# Patient Record
Sex: FEMALE | Race: WHITE | Employment: OTHER | URBAN - METROPOLITAN AREA
[De-identification: names, ages, dates, MRNs, and addresses within clinical notes are randomized per-mention and may not be internally consistent; named-entity substitution may affect disease eponyms.]

---

## 2017-01-25 ENCOUNTER — APPOINTMENT (OUTPATIENT)
Dept: LAB | Facility: MEDICAL CENTER | Age: 77
End: 2017-01-25
Payer: COMMERCIAL

## 2017-01-25 ENCOUNTER — GENERIC CONVERSION - ENCOUNTER (OUTPATIENT)
Dept: OTHER | Facility: OTHER | Age: 77
End: 2017-01-25

## 2017-01-25 DIAGNOSIS — J30.9 ALLERGIC RHINITIS: ICD-10-CM

## 2017-01-25 DIAGNOSIS — Z00.00 ENCOUNTER FOR GENERAL ADULT MEDICAL EXAMINATION WITHOUT ABNORMAL FINDINGS: ICD-10-CM

## 2017-01-25 DIAGNOSIS — E03.9 HYPOTHYROIDISM: ICD-10-CM

## 2017-01-25 LAB
HEMOCCULT STL QL IA: POSITIVE
TSH SERPL DL<=0.05 MIU/L-ACNC: 7.61 UIU/ML (ref 0.36–3.74)

## 2017-01-25 PROCEDURE — G0328 FECAL BLOOD SCRN IMMUNOASSAY: HCPCS

## 2017-01-25 PROCEDURE — 84443 ASSAY THYROID STIM HORMONE: CPT

## 2017-01-25 PROCEDURE — 36415 COLL VENOUS BLD VENIPUNCTURE: CPT

## 2017-02-20 ENCOUNTER — HOSPITAL ENCOUNTER (OUTPATIENT)
Dept: RADIOLOGY | Age: 77
Discharge: HOME | End: 2017-02-20
Payer: COMMERCIAL

## 2017-02-20 DIAGNOSIS — R29.890 LOSS OF HEIGHT: ICD-10-CM

## 2017-02-20 PROCEDURE — 77080 DXA BONE DENSITY AXIAL: CPT

## 2017-02-21 ENCOUNTER — GENERIC CONVERSION - ENCOUNTER (OUTPATIENT)
Dept: OTHER | Facility: OTHER | Age: 77
End: 2017-02-21

## 2017-03-08 DIAGNOSIS — E78.00 PURE HYPERCHOLESTEROLEMIA: ICD-10-CM

## 2017-03-08 DIAGNOSIS — I10 ESSENTIAL (PRIMARY) HYPERTENSION: ICD-10-CM

## 2017-03-08 DIAGNOSIS — E03.9 HYPOTHYROIDISM: ICD-10-CM

## 2017-03-23 ENCOUNTER — ALLSCRIPTS OFFICE VISIT (OUTPATIENT)
Dept: OTHER | Facility: OTHER | Age: 77
End: 2017-03-23

## 2017-04-12 ENCOUNTER — APPOINTMENT (OUTPATIENT)
Dept: LAB | Facility: CLINIC | Age: 77
End: 2017-04-12
Payer: COMMERCIAL

## 2017-04-12 DIAGNOSIS — E03.9 HYPOTHYROIDISM: ICD-10-CM

## 2017-04-12 DIAGNOSIS — I10 ESSENTIAL (PRIMARY) HYPERTENSION: ICD-10-CM

## 2017-04-12 DIAGNOSIS — E78.00 PURE HYPERCHOLESTEROLEMIA: ICD-10-CM

## 2017-04-12 LAB
ALBUMIN SERPL BCP-MCNC: 3.6 G/DL (ref 3.5–5)
ALP SERPL-CCNC: 83 U/L (ref 46–116)
ALT SERPL W P-5'-P-CCNC: 23 U/L (ref 12–78)
ANION GAP SERPL CALCULATED.3IONS-SCNC: 10 MMOL/L (ref 4–13)
AST SERPL W P-5'-P-CCNC: 16 U/L (ref 5–45)
BILIRUB SERPL-MCNC: 0.27 MG/DL (ref 0.2–1)
BUN SERPL-MCNC: 42 MG/DL (ref 5–25)
CALCIUM SERPL-MCNC: 9.5 MG/DL (ref 8.3–10.1)
CHLORIDE SERPL-SCNC: 99 MMOL/L (ref 100–108)
CHOLEST SERPL-MCNC: 223 MG/DL (ref 50–200)
CO2 SERPL-SCNC: 27 MMOL/L (ref 21–32)
CREAT SERPL-MCNC: 1.5 MG/DL (ref 0.6–1.3)
GFR SERPL CREATININE-BSD FRML MDRD: 33.8 ML/MIN/1.73SQ M
GLUCOSE P FAST SERPL-MCNC: 99 MG/DL (ref 65–99)
HDLC SERPL-MCNC: 48 MG/DL (ref 40–60)
LDLC SERPL CALC-MCNC: 112 MG/DL (ref 0–100)
POTASSIUM SERPL-SCNC: 3.5 MMOL/L (ref 3.5–5.3)
PROT SERPL-MCNC: 7.9 G/DL (ref 6.4–8.2)
SODIUM SERPL-SCNC: 136 MMOL/L (ref 136–145)
TRIGL SERPL-MCNC: 317 MG/DL
TSH SERPL DL<=0.05 MIU/L-ACNC: 2.56 UIU/ML (ref 0.36–3.74)

## 2017-04-12 PROCEDURE — 80061 LIPID PANEL: CPT

## 2017-04-12 PROCEDURE — 80053 COMPREHEN METABOLIC PANEL: CPT

## 2017-04-12 PROCEDURE — 36415 COLL VENOUS BLD VENIPUNCTURE: CPT

## 2017-04-12 PROCEDURE — 84443 ASSAY THYROID STIM HORMONE: CPT

## 2017-06-09 ENCOUNTER — GENERIC CONVERSION - ENCOUNTER (OUTPATIENT)
Dept: OTHER | Facility: OTHER | Age: 77
End: 2017-06-09

## 2017-06-16 ENCOUNTER — TRANSCRIBE ORDERS (OUTPATIENT)
Dept: LAB | Facility: CLINIC | Age: 77
End: 2017-06-16

## 2017-06-16 ENCOUNTER — APPOINTMENT (OUTPATIENT)
Dept: LAB | Facility: CLINIC | Age: 77
End: 2017-06-16
Payer: COMMERCIAL

## 2017-06-16 DIAGNOSIS — Z00.00 ROUTINE GENERAL MEDICAL EXAMINATION AT A HEALTH CARE FACILITY: ICD-10-CM

## 2017-06-16 DIAGNOSIS — I10 ESSENTIAL HYPERTENSION, MALIGNANT: Primary | ICD-10-CM

## 2017-06-16 LAB
ANION GAP SERPL CALCULATED.3IONS-SCNC: 5 MMOL/L (ref 4–13)
BUN SERPL-MCNC: 19 MG/DL (ref 5–25)
CALCIUM SERPL-MCNC: 9.4 MG/DL (ref 8.3–10.1)
CHLORIDE SERPL-SCNC: 105 MMOL/L (ref 100–108)
CO2 SERPL-SCNC: 30 MMOL/L (ref 21–32)
CREAT SERPL-MCNC: 0.83 MG/DL (ref 0.6–1.3)
ERYTHROCYTE [DISTWIDTH] IN BLOOD BY AUTOMATED COUNT: 15 % (ref 11.6–15.1)
GFR SERPL CREATININE-BSD FRML MDRD: >60 ML/MIN/1.73SQ M
GLUCOSE P FAST SERPL-MCNC: 92 MG/DL (ref 65–99)
HCT VFR BLD AUTO: 42.2 % (ref 34.8–46.1)
HGB BLD-MCNC: 13.5 G/DL (ref 11.5–15.4)
MCH RBC QN AUTO: 29.3 PG (ref 26.8–34.3)
MCHC RBC AUTO-ENTMCNC: 32 G/DL (ref 31.4–37.4)
MCV RBC AUTO: 92 FL (ref 82–98)
PLATELET # BLD AUTO: 319 THOUSANDS/UL (ref 149–390)
PMV BLD AUTO: 11.1 FL (ref 8.9–12.7)
POTASSIUM SERPL-SCNC: 4.2 MMOL/L (ref 3.5–5.3)
RBC # BLD AUTO: 4.61 MILLION/UL (ref 3.81–5.12)
SODIUM SERPL-SCNC: 140 MMOL/L (ref 136–145)
WBC # BLD AUTO: 8 THOUSAND/UL (ref 4.31–10.16)

## 2017-06-16 PROCEDURE — 85027 COMPLETE CBC AUTOMATED: CPT

## 2017-06-16 PROCEDURE — 80048 BASIC METABOLIC PNL TOTAL CA: CPT

## 2017-06-16 PROCEDURE — 36415 COLL VENOUS BLD VENIPUNCTURE: CPT

## 2017-06-30 ENCOUNTER — GENERIC CONVERSION - ENCOUNTER (OUTPATIENT)
Dept: OTHER | Facility: OTHER | Age: 77
End: 2017-06-30

## 2017-07-25 ENCOUNTER — ALLSCRIPTS OFFICE VISIT (OUTPATIENT)
Dept: OTHER | Facility: OTHER | Age: 77
End: 2017-07-25

## 2017-07-25 DIAGNOSIS — Z12.31 ENCOUNTER FOR SCREENING MAMMOGRAM FOR MALIGNANT NEOPLASM OF BREAST: ICD-10-CM

## 2017-07-25 DIAGNOSIS — E78.00 PURE HYPERCHOLESTEROLEMIA: ICD-10-CM

## 2017-07-25 DIAGNOSIS — I10 ESSENTIAL (PRIMARY) HYPERTENSION: ICD-10-CM

## 2017-07-25 DIAGNOSIS — E03.9 HYPOTHYROIDISM: ICD-10-CM

## 2017-10-20 ENCOUNTER — APPOINTMENT (OUTPATIENT)
Dept: LAB | Facility: CLINIC | Age: 77
End: 2017-10-20
Payer: COMMERCIAL

## 2017-10-20 DIAGNOSIS — E03.9 HYPOTHYROIDISM: ICD-10-CM

## 2017-10-20 DIAGNOSIS — I10 ESSENTIAL (PRIMARY) HYPERTENSION: ICD-10-CM

## 2017-10-20 DIAGNOSIS — E78.00 PURE HYPERCHOLESTEROLEMIA: ICD-10-CM

## 2017-10-20 LAB
ALBUMIN SERPL BCP-MCNC: 3.6 G/DL (ref 3.5–5)
ALP SERPL-CCNC: 61 U/L (ref 46–116)
ALT SERPL W P-5'-P-CCNC: 21 U/L (ref 12–78)
ANION GAP SERPL CALCULATED.3IONS-SCNC: 6 MMOL/L (ref 4–13)
AST SERPL W P-5'-P-CCNC: 20 U/L (ref 5–45)
BILIRUB SERPL-MCNC: 0.44 MG/DL (ref 0.2–1)
BUN SERPL-MCNC: 18 MG/DL (ref 5–25)
CALCIUM SERPL-MCNC: 9.6 MG/DL (ref 8.3–10.1)
CHLORIDE SERPL-SCNC: 103 MMOL/L (ref 100–108)
CHOLEST SERPL-MCNC: 152 MG/DL (ref 50–200)
CO2 SERPL-SCNC: 29 MMOL/L (ref 21–32)
CREAT SERPL-MCNC: 1.04 MG/DL (ref 0.6–1.3)
GFR SERPL CREATININE-BSD FRML MDRD: 52 ML/MIN/1.73SQ M
GLUCOSE P FAST SERPL-MCNC: 97 MG/DL (ref 65–99)
HDLC SERPL-MCNC: 58 MG/DL (ref 40–60)
LDLC SERPL CALC-MCNC: 62 MG/DL (ref 0–100)
POTASSIUM SERPL-SCNC: 4 MMOL/L (ref 3.5–5.3)
PROT SERPL-MCNC: 8.1 G/DL (ref 6.4–8.2)
SODIUM SERPL-SCNC: 138 MMOL/L (ref 136–145)
TRIGL SERPL-MCNC: 159 MG/DL
TSH SERPL DL<=0.05 MIU/L-ACNC: 1.19 UIU/ML (ref 0.36–3.74)

## 2017-10-20 PROCEDURE — 80053 COMPREHEN METABOLIC PANEL: CPT

## 2017-10-20 PROCEDURE — 80061 LIPID PANEL: CPT

## 2017-10-20 PROCEDURE — 36415 COLL VENOUS BLD VENIPUNCTURE: CPT

## 2017-10-20 PROCEDURE — 84443 ASSAY THYROID STIM HORMONE: CPT

## 2017-12-05 ENCOUNTER — ALLSCRIPTS OFFICE VISIT (OUTPATIENT)
Dept: OTHER | Facility: OTHER | Age: 77
End: 2017-12-05

## 2018-01-11 NOTE — PROGRESS NOTES
History of Present Illness  Care Coordination Encounter Information:   Type of Encounter: Telephonic   Contact: Initial Contact   Last Office Visit: 4/15/16   Spoke to Patient  Care Coordination SL Nurse [facility]:   The reason for call is to discuss outreach for follow up/needed services  Outreached patient to see how she is doing since her transition of care visit with primary care on 4/15/16  She is disgusted with how tired and short of breath she gets  She also states he legs hurt  She has gained 18 pounds since quitting smoking  She cannot do things like she use to and that bothers her  She is sick of hospitals and doctor appointments  She sees Dr Adrián Bagley Pulmonology on 5/19/16 and Cardiology on 6/3/16  She is not weighing herself everyday because she doesn't like the number on the scale  I strongly encouraged her to weigh herself at least every other day  Weight this week is 201 pounds  I asked if it okay that I check back with her she stated yes  I will call her after her appointment on 5/17/16  I also reviewed signs of when to call cardiology or primary care doctor with increased shortness of breath weight gain of 2 or more pounds in one day and swelling in feet and ankles  Active Problems    1  Acute asthmatic bronchitis (493 90) (J45 909)   2  Acute sinusitis (461 9) (J01 90)   3  Acute suppurative otitis media without spontaneous rupture of ear drum, unspecified   laterality   4  Afib (427 31) (I48 91)   5  Allergic rhinitis (477 9) (J30 9)   6  Anxiety (300 00) (F41 9)   7  CHF (congestive heart failure) (428 0) (I50 9)   8  Chronic obstructive pulmonary disease (496) (J44 9)   9  Cough (786 2) (R05)   10  Dental disorder (525 9) (K08 9)   11  Dyspnea on exertion (786 09) (R06 09)   12  Heart disease (429 9) (I51 9)   13  Hypercholesterolemia (272 0) (E78 0)   14  Hyperglycemia (790 29) (R73 9)   15  Hypertension (401 9) (I10)   16  Hypothyroidism (244 9) (E03 9)   17   Impacted cerumen, unspecified laterality (380 4) (H61 20)   18  Lumbar radiculopathy (724 4) (M54 16)   19  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)   20  Medicare annual wellness visit, initial (V70 0) (Z00 00)   21  Medicare annual wellness visit, subsequent (V70 0) (Z00 00)   22  Need for influenza vaccination (V04 81) (Z23)   23  Need for pneumococcal vaccination (V03 82) (Z23)   24  Need for vaccination with 13-polyvalent pneumococcal conjugate vaccine (V03 82) (Z23)   25  Sciatica (724 3) (M54 30)   26  Screening for breast cancer (V76 10) (Z12 39)   27  Screening for colorectal cancer (V76 51) (Z12 11,Z12 12)   28  Screening for genitourinary condition (V81 6) (Z13 89)   29  Screening for neurological condition (V80 09) (Z13 89)   30  Tobacco abuse (305 1) (Z72 0)   31  Tobacco use (305 1) (Z72 0)    Surgical History    1  History of Heart Surgery    Family History  Father    1  Family history of Essential Hypertension   2  Family history of Heart Disease (V17 49)    Social History    · Daily Coffee Consumption (___ Cups/Day)   · Daily Cola Consumption (___ Cans/Day)   · Marital History -    · Never A Smoker   · Personal Protective Equipment Seatbelts   · Tobacco use (305 1) (Z72 0)    Current Meds    1  Promethazine HCl - 25 MG Oral Tablet; TAKE 1 TABLET 3 TIMES DAILY AS NEEDED; Therapy: 64TAL7012 to (Natasha Watt)  Requested for: 46MNN3339; Last   Rx:81Vxv3649 Ordered    2  ALPRAZolam 0 25 MG Oral Tablet; take 1/2 to 1 tablet by mouth every 6 hours if needed; Therapy: 29NWS7503 to (Evaluate:03Jun2016)  Requested for: 71FWQ9489; Last   Rx:93Jnw8551 Ordered    3  Spiriva HandiHaler 18 MCG Inhalation Capsule; INHALE 1 CAPSULE Daily; Therapy: 24QDZ7559 to (Evaluate:11Oct2014); Last Rx:93Oww7326 Ordered    4  Klor-Con M20 20 MEQ Oral Tablet Extended Release; take 1 tablet by mouth once daily;    Therapy: 04TLG1480 to (Fabiano Holt)  Requested for: 39Gyr4884; Last   Rx:98Qxg6494 Ordered    5  Potassium Chloride Anna ER 20 MEQ Oral Tablet Extended Release; take 1 tablet by   mouth once daily; Therapy: 47WFG5831 to (Evaluate:13Oct2016)  Requested for: 30Qvf4267; Last   Rx:09Iix7897 Ordered    6  AmLODIPine Besylate 10 MG Oral Tablet; TAKE 1 TABLET DAILY AS DIRECTED    Requested for: 29OQQ6461; Last Rx:70Ghk9593 Ordered   7  Furosemide 40 MG Oral Tablet; take 1 tablet by mouth once daily; Therapy: 89QTW5898 to (Evaluate:14Aug2016)  Requested for: 95Dkd0088; Last   Rx:63Sxf8930 Ordered    8  Levothyroxine Sodium 75 MCG Oral Tablet; take 1 tablet by mouth once daily as directed; Therapy: 88Euo2074 to (Evaluate:02Jun2016)  Requested for: 62YRE0931; Last   Rx:90Qwj0577 Ordered    9  Chantix 1 MG Oral Tablet; Take 1 tablet twice daily; Therapy: 04TZG6235 to ([phone])  Requested for: 59FDA6224; Last   Rx:59Ouc7465 Ordered    10  Atorvastatin Calcium 40 MG Oral Tablet; Take 1 tablet daily Recorded   11  Catrina Low Dose 81 MG Oral Tablet Delayed Release; Therapy: [phone]) to Recorded   12  Carvedilol 25 MG Oral Tablet; Take 1 tablet twice daily; Therapy: (Recorded:85Skt8592) to Recorded   13  Clopidogrel Bisulfate 75 MG Oral Tablet; Therapy: (Recorded:85Gtk2525) to Recorded   14  Pantoprazole Sodium 40 MG Oral Tablet Delayed Release; Therapy: (Recorded:57Vfe6896) to Recorded   15  Symbicort 160-4 5 MCG/ACT Inhalation Aerosol Recorded   16  Valsartan-Hydrochlorothiazide 320-12 5 MG Oral Tablet; TAKE 1 TABLET EVERY DAY    Recorded   17  Varenicline Tartrate 0 5 MG TABS Recorded    Allergies    1  No Known Drug Allergies    Health Management   Digital Bilateral Screening Mammogram With CAD; every 1 year; Next Due: 11Jun2015; Overdue   COLONOSCOPY; every 10 years; Next Due: 11Jun2015; Overdue    End of Encounter Meds    1  Promethazine HCl - 25 MG Oral Tablet; TAKE 1 TABLET 3 TIMES DAILY AS NEEDED;    Therapy: 67IOS1802 to (Evaluate:28Mar2016) Requested for: 98PYQ3856; Last   Rx:69Ekw5926 Ordered    2  ALPRAZolam 0 25 MG Oral Tablet; take 1/2 to 1 tablet by mouth every 6 hours if needed; Therapy: 44KTB5252 to (Evaluate:03Jun2016)  Requested for: 72RZP6569; Last   Rx:71Tzp6987 Ordered    3  Spiriva HandiHaler 18 MCG Inhalation Capsule; INHALE 1 CAPSULE Daily; Therapy: 88RIJ9186 to (Evaluate:11Oct2014); Last Rx:34Dya2231 Ordered    4  Klor-Con M20 20 MEQ Oral Tablet Extended Release; take 1 tablet by mouth once daily; Therapy: 89XDI2639 to (Coco Patricio)  Requested for: 96Aux1840; Last   Rx:79Mrg8584 Ordered    5  Potassium Chloride Anna ER 20 MEQ Oral Tablet Extended Release; take 1 tablet by   mouth once daily; Therapy: 85MGW6657 to (Evaluate:13Oct2016)  Requested for: 61Noc4202; Last   Rx:31Qaq2936 Ordered    6  AmLODIPine Besylate 10 MG Oral Tablet; TAKE 1 TABLET DAILY AS DIRECTED    Requested for: 34YDQ2174; Last Rx:54Vsc9872 Ordered   7  Furosemide 40 MG Oral Tablet; take 1 tablet by mouth once daily; Therapy: 05VHH3142 to (Evaluate:75Gfr1695)  Requested for: 12Acj5236; Last   Rx:08Jgj6458 Ordered    8  Levothyroxine Sodium 75 MCG Oral Tablet; take 1 tablet by mouth once daily as directed; Therapy: 94Ude9051 to (Evaluate:02Jun2016)  Requested for: 06KFS2868; Last   Rx:33Llt7206 Ordered    9  Chantix 1 MG Oral Tablet; Take 1 tablet twice daily; Therapy: 75VNB7483 to (Ellis Cam)  Requested for: 50JAO3845; Last   Rx:54Cjd3673 Ordered    10  Atorvastatin Calcium 40 MG Oral Tablet; Take 1 tablet daily Recorded   11  Catrina Low Dose 81 MG Oral Tablet Delayed Release; Therapy: ([phone]) to Recorded   12  Carvedilol 25 MG Oral Tablet; Take 1 tablet twice daily; Therapy: (Recorded:54Mha8551) to Recorded   13  Clopidogrel Bisulfate 75 MG Oral Tablet; Therapy: (Recorded:42Dlb3239) to Recorded   14  Pantoprazole Sodium 40 MG Oral Tablet Delayed Release; Therapy: (Recorded:92Aea6766) to Recorded   15   Symbicort 160-4 5 MCG/ACT Inhalation Aerosol Recorded   16  Valsartan-Hydrochlorothiazide 320-12 5 MG Oral Tablet; TAKE 1 TABLET EVERY DAY    Recorded   17   Varenicline Tartrate 0 5 MG TABS Recorded    Future Appointments    Date/Time Provider Specialty Site   07/14/2016 08:30 AM Rico Kramer, [facility]     Patient Care Team    Care Team Member Role Specialty Office Number   [place] DO  Cardiology [phone]   Mauricio Rivas MD  Ophthalmology [phone]   Isabelle Castaneda MD  Cardiology [phone]     Signatures   Electronically signed by : Opal May RN; May 12 2016 10:38AM EST                       (Author)

## 2018-01-12 VITALS
DIASTOLIC BLOOD PRESSURE: 80 MMHG | HEIGHT: 63 IN | TEMPERATURE: 97.1 F | WEIGHT: 188 LBS | BODY MASS INDEX: 33.31 KG/M2 | HEART RATE: 60 BPM | SYSTOLIC BLOOD PRESSURE: 132 MMHG

## 2018-01-12 NOTE — RESULT NOTES
Verified Results  (1) COMPREHENSIVE METABOLIC PANEL 29TZQ2273 84:65EB Siddharth Anton     Test Name Result Flag Reference   GLUCOSE 107 mg/dL H 65-99   Fasting reference interval   UREA NITROGEN (BUN) 17 mg/dL  7-25   CREATININE 0 93 mg/dL  0 60-0 93   For patients >52years of age, the reference limit  for Creatinine is approximately 13% higher for people  identified as -American  eGFR NON-AFR   AMERICAN 60 mL/min/1 73m2  > OR = 60   eGFR AFRICAN AMERICAN 69 mL/min/1 73m2  > OR = 60   BUN/CREATININE RATIO   9-71   NOT APPLICABLE (calc)   SODIUM 140 mmol/L  135-146   POTASSIUM 4 5 mmol/L  3 5-5 3   CHLORIDE 104 mmol/L     CARBON DIOXIDE 31 mmol/L  20-31   CALCIUM 9 4 mg/dL  8 6-10 4   PROTEIN, TOTAL 7 4 g/dL  6 1-8 1   ALBUMIN 4 2 g/dL  3 6-5 1   GLOBULIN 3 2 g/dL (calc)  1 9-3 7   ALBUMIN/GLOBULIN RATIO 1 3 (calc)  1 0-2 5   BILIRUBIN, TOTAL 0 6 mg/dL  0 2-1 2   ALKALINE PHOSPHATASE 89 U/L     AST 16 U/L  10-35   ALT 12 U/L  6-29

## 2018-01-12 NOTE — PROGRESS NOTES
History of Present Illness  Care Coordination Encounter Information:   Type of Encounter: Telephonic    Spoke to  5/20/16 left message for patient to call back  5/23/16 Left message for patient to call back  Active Problems    1  Acute asthmatic bronchitis (493 90) (J45 909)   2  Acute sinusitis (461 9) (J01 90)   3  Acute suppurative otitis media without spontaneous rupture of ear drum, unspecified   laterality   4  Afib (427 31) (I48 91)   5  Allergic rhinitis (477 9) (J30 9)   6  Anxiety (300 00) (F41 9)   7  CHF (congestive heart failure) (428 0) (I50 9)   8  Chronic obstructive pulmonary disease (496) (J44 9)   9  Cough (786 2) (R05)   10  Dental disorder (525 9) (K08 9)   11  Dyspnea on exertion (786 09) (R06 09)   12  Heart disease (429 9) (I51 9)   13  Hypercholesterolemia (272 0) (E78 0)   14  Hyperglycemia (790 29) (R73 9)   15  Hypertension (401 9) (I10)   16  Hypothyroidism (244 9) (E03 9)   17  Impacted cerumen, unspecified laterality (380 4) (H61 20)   18  Lumbar radiculopathy (724 4) (M54 16)   19  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)   20  Medicare annual wellness visit, initial (V70 0) (Z00 00)   21  Medicare annual wellness visit, subsequent (V70 0) (Z00 00)   22  Need for influenza vaccination (V04 81) (Z23)   23  Need for pneumococcal vaccination (V03 82) (Z23)   24  Need for vaccination with 13-polyvalent pneumococcal conjugate vaccine (V03 82) (Z23)   25  Sciatica (724 3) (M54 30)   26  Screening for breast cancer (V76 10) (Z12 39)   27  Screening for colorectal cancer (V76 51) (Z12 11,Z12 12)   28  Screening for genitourinary condition (V81 6) (Z13 89)   29  Screening for neurological condition (V80 09) (Z13 89)   30  Tobacco abuse (305 1) (Z72 0)   31  Tobacco use (305 1) (Z72 0)    Surgical History    1  History of Heart Surgery    Family History  Father    1  Family history of Essential Hypertension   2   Family history of Heart Tablet Delayed Release; Therapy: ([phone]) to Recorded   12  Carvedilol 25 MG Oral Tablet; Take 1 tablet twice daily; Therapy: (Recorded:69Cil6233) to Recorded   13  Clopidogrel Bisulfate 75 MG Oral Tablet; Therapy: (Recorded:44Jik8819) to Recorded   14  Pantoprazole Sodium 40 MG Oral Tablet Delayed Release; Therapy: (Recorded:77Jwd0707) to Recorded   15  Symbicort 160-4 5 MCG/ACT Inhalation Aerosol Recorded   16  Valsartan-Hydrochlorothiazide 320-12 5 MG Oral Tablet; TAKE 1 TABLET EVERY DAY    Recorded   17  Varenicline Tartrate 0 5 MG TABS Recorded    Allergies    1  No Known Drug Allergies    Health Management   Digital Bilateral Screening Mammogram With CAD; every 1 year; Next Due: 11Jun2015; Overdue   COLONOSCOPY; every 10 years; Next Due: 11Jun2015; Overdue    End of Encounter Meds    1  Promethazine HCl - 25 MG Oral Tablet; TAKE 1 TABLET 3 TIMES DAILY AS NEEDED; Therapy: 01WQV7479 to (Uma Bingham)  Requested for: 76UBZ0635; Last   Rx:38Lpv1818 Ordered    2  ALPRAZolam 0 25 MG Oral Tablet; take 1/2 to 1 tablet by mouth every 6 hours if needed; Therapy: 51OFD2905 to (Evaluate:03Jun2016)  Requested for: 89ZPN8265; Last   Rx:81Iwm5166 Ordered    3  Spiriva HandiHaler 18 MCG Inhalation Capsule; INHALE 1 CAPSULE Daily; Therapy: 70WRR2703 to (Evaluate:11Oct2014); Last Rx:37Wsw7012 Ordered    4  Klor-Con M20 20 MEQ Oral Tablet Extended Release; take 1 tablet by mouth once daily; Therapy: 89QFH6027 to (Fuad Bolanos)  Requested for: 37Dcs0253; Last   Rx:43Dwv6202 Ordered    5  Potassium Chloride Anna ER 20 MEQ Oral Tablet Extended Release; take 1 tablet by   mouth once daily; Therapy: 40OXP3779 to (Evaluate:13Oct2016)  Requested for: 21Mey6993; Last   Rx:87Mur9880 Ordered    6  AmLODIPine Besylate 10 MG Oral Tablet; TAKE 1 TABLET DAILY AS DIRECTED    Requested for: 38SDW4705; Last Rx:91Alc8501 Ordered   7   Furosemide 40 MG Oral Tablet; take 1 tablet by mouth once daily; Therapy: 68OJZ7636 to (Evaluate:66Zaz5013)  Requested for: 38Mrz3985; Last   Rx:21Vxu9331 Ordered    8  Levothyroxine Sodium 75 MCG Oral Tablet; take 1 tablet by mouth once daily as directed; Therapy: 40Qva2189 to (Evaluate:02Jun2016)  Requested for: 14UNF9153; Last   Rx:27Ixe8778 Ordered    9  Chantix 1 MG Oral Tablet; Take 1 tablet twice daily; Therapy: 11QQI3699 to (Derl Kehr)  Requested for: 75JFF2843; Last   Rx:85Wsp6675 Ordered    10  Atorvastatin Calcium 40 MG Oral Tablet; Take 1 tablet daily Recorded   11  Catrina Low Dose 81 MG Oral Tablet Delayed Release; Therapy: [phone]) to Recorded   12  Carvedilol 25 MG Oral Tablet; Take 1 tablet twice daily; Therapy: (Recorded:69Mvn0217) to Recorded   13  Clopidogrel Bisulfate 75 MG Oral Tablet; Therapy: (Recorded:28Wsw9632) to Recorded   14  Pantoprazole Sodium 40 MG Oral Tablet Delayed Release; Therapy: (Recorded:21Rew7317) to Recorded   15  Symbicort 160-4 5 MCG/ACT Inhalation Aerosol Recorded   16  Valsartan-Hydrochlorothiazide 320-12 5 MG Oral Tablet; TAKE 1 TABLET EVERY DAY    Recorded   17   Varenicline Tartrate 0 5 MG TABS Recorded    Future Appointments    Date/Time Provider Specialty Site   07/14/2016 08:30 AM Adelaide Singer DO [address]     Patient Care Team    Care Team Member Role Specialty Office Number   [place]   Cardiology [phone]   Gregg Garvin MD  Ophthalmology [phone]   Birgit Ybarra MD  Cardiology [phone]     Signatures   Electronically signed by : Areli Barrera RN; May 20 2016  2:03PM EST                       (Author)    Electronically signed by : Areli Barrera RN; May 23 2016  3:00PM EST                       (Author)

## 2018-01-13 NOTE — PROGRESS NOTES
Message    Type of Encounter: Telephonic   Contact: Initial Contact    Spoke to Patient  The reason for call is to discuss coordination of meeting care plan treatment goals  [facility]   Member name: Marichuy Broderick, [birth year]  Risks: recent facility admission at [address] [place] [address], from 4/8/16-4/10/16 with admitting diagnosis- MI  Additional diagnoses include PVD, CAD, past MI in 2005, COPD, CHF, HTN, and anxiety  Patient primary concerns: Late entry from call on 4/13/16: TCT member home phone, to introduce the CM program  I was able to s/w Kassandra and she vaguely remembered talking to this CM after her hernia surgery in March of 2013  After the CM program was explained, she consented to accept CM  She is aware that she will receive welcome packet from [facility] that contains consent and member rights forms  She is also aware that her PCP, Dr Babar Vasquez, will receive a copy of this letter  This CM confirmed the POC with the member  We reviewed Kassandra's health issues  She admits her health has not been as good since she had her MI in 2005  She had been a smoker for 55 years and though the MI has impacted her health, she did not quit smoking until November of 2015  Kassandra admits her health issues have been growing more complex, including her MI in 2005 and hernia repair in 2013  She is also treated for COPD, CHF, HTN, HLD, Anxiety and PVD  She stated she had not been "thin" for many years, but admits to a 35+ lb weigh gain since quitting smoking, with her past weight being approximately 183 lbs and her current weight is 209 lbs and she is 5feet, 5 inches tall  She sought care for leg pain and she was diagnosed with the PVD and she had blockages cleared in veins in both her legs in January, so her leg pain is gone  She has been experiencing increased shortness of breath with even minimal activity for several months and this did not improve after the angioplasties in her legs   Her daughter finally made her seek care from her PCP in the beginning of April and that was when she was admitted to [facility] on 4/8/16 and she was diagnosed with CHF exacerbation and mild MI  She was d/c home on 4/10/16, no HHC ordered, she did not feel she needs this  Coordination of care: Kassandra [place] alone in [place] [address] [address] [address], with several [place]  She has first floor set up and her 2 daughters live close by and though they work, they stop daily to help her  Her sister is also close by and is there daily to help her  Her laundry is in the basement and her daughters are taking care of this for now  She stated she is able to get downstairs ok, but climbing the steps is very difficult  Sharon Gallardo has appointment with her PCP on 4/15 and she knows she needs to contact cardiology for appointment  She has pulmonary testing set up on 5/17 and she will see the pulmonologist on 5/19 and they did mention she is a good candidate for pulmonary rehab and she expects that is the next step  She did not want HHC, she feels she is able to manage fine  Kassandra was tested for 02 but her sats in RA do not meet the criteria for 02  outcomes: Sharon Gallardo is not driving but between her sister and her 2 daughters, she will have transportation to appointments  Knowledge: Sharon Gallardo understood her past health issues but admits to being overwhelmed by the increasing shortness of breath, but she then quickly stated she should not be surprised she has breathing problems after smoking for 55 years  She just wants to know what to do to regain her PLOF as she prefers to be independent  Outcomes: She agrees and is ready to do as the MD suggest for her care  Sharonvasu Gallardo stated she will call her MD if she has questions, she has great trust in her PCP  Adherence: Sharon Gallardo said she came home with 2 medications and now has about 10 to take  She assured me she has all of her scripts and has her meds set up in her pill boxes to take as ordered   She said her appetite is too good and her weight is 209 lbs  She would like to lose weight in the future  Her daughters are shopping and bringing in meals for her at this time  She ambulates independently or with her cane  Outcomes: Kassandra denies restrictions with her activity and she feels she is following her POC  Empowerment: Kassandra is pleasant but admits to anxiety with her increasing shortness of breath  She has good support from her daughters and especially her sister, whom she can talk to about any concern  She technically belongs to a local [employer] but has not been to services in a long time, but still sends in her envelopes with donations, so she feels she could make outreach to the [facility] for assistance if she needed it  Outcomes: Kassandra confirmed her eldest daughter is her POA and that she does have a living will  She was interested to hear about the Behavior health options that are available to her but is not interested in accessing them at this time  Kassandra confirmed she has limited finances and that she does get community assistance for oil and electric and she is already on a low income assistance program for her prescriptions  This CM assessed for medication discrepancies following facility discharge to home  Safety: Dominique Joseph stated she is independent in her personal care and ambulation and she will use a cane if she is tired  She has a shower chair and will get that out so she feels more secure in the shower  She denies falls or pain but confirms increased shortness of breath with even minimal activity  She has gotten away from weighing herself on a regular basis but after talking to this CM, agreed it may be a good idea to start again  Outcomes: This CM reinforced about daily weights in the morning prior to intake to determine if she is retaining any fluid weight  This CM educated this is a quick and inexpensive way to keep track of her CHF and keep it in control    Barriers to care: recent facility stay treating MI and CHF exacerbation, anxiety, dyspnea, limited finances  Plan: Kassandra did accept CM program and she is aware she will receive CM welcome packet  Crista Councilman agreed for return call at the end of the next week to assess her recovery and pulmonary status  She did take this CM's phone number and is aware that she can make outreach to this CM prior to next call if she has questions  I will notify the PCP office of Rosalind Gallego, Dr Ramirez Speaks via EMR and I will send email to [facility] to notify of CM admission     Carter Cleveland RN, [facility], [facility], [phone]       Patient Care Team    Care Team Member Role Specialty Office Number   [place] [facility]  Cardiology [phone]   Ramon Brantley MD  Ophthalmology [phone]   Viji Devine MD  Cardiology [phone]   Electronically signed by : Carter Cleveland, ; Apr 15 2016  9:12AM EST                       (Author)

## 2018-01-15 VITALS
HEIGHT: 63 IN | BODY MASS INDEX: 31.43 KG/M2 | SYSTOLIC BLOOD PRESSURE: 132 MMHG | HEART RATE: 70 BPM | TEMPERATURE: 97.8 F | WEIGHT: 177.38 LBS | DIASTOLIC BLOOD PRESSURE: 80 MMHG

## 2018-01-15 NOTE — RESULT NOTES
Verified Results  * DXA BONE DENSITY SPINE HIP AND PELVIS [id number] 12:52PM Aroldo Barnes Order Number: [id number]    - Patient Instructions: To schedule this appointment, please contact Central Scheduling at [phone]  Test Name Result Flag Reference   DXA BONE DENSITY SPINE HIP AND PELVIS (Report)     CENTRAL DXA SCAN     CLINICAL HISTORY:  68year old post-menopausal  female  Loss of height  TECHNIQUE: Bone densitometry was performed using a Horizon A bone densitometer  Regions of interest appear properly placed  There is lumbar levoscoliosis which could limit the study  COMPARISON: None  RESULTS:    LUMBAR SPINE: L1-L4:   BMD 1 070 gm/cm2   T-score 0 2   Z-score 2 7     LEFT TOTAL HIP:   BMD 0 933 gm/cm2   T-score -0 1   Z-score 1 8     LEFT FEMORAL NECK:   BMD 0 715 gm/cm2   T-score -1 2   Z-score 0 9             IMPRESSION:   1  Based on the [facility] classification, the T-score of -1 2 is consistent with low bone mineral density  2  The 10 year risk of hip fracture is 5 1 %, with the 10 year risk of major osteoporotic fracture being 16 %, as calculated by the WHO fracture risk assessment tool (FRAX)  The current NOF guidelines recommend treating patients with FRAX 10 year risk    score of >3% for hip fracture and >20% for major osteoporotic fracture  3  Any secondary causes of low bone mineral density should be excluded prior to treatment, if clinically indicated  4  A daily intake of at least 1200 mg calcium and 800 - 1000 IU of Vitamin D, as well as weight bearing and muscle strengthening exercise, fall prevention and avoidance of tobacco and excessive alcohol intake as basic preventive measures are suggested  5  Repeat DXA scan in 18-24 months as clinically indicated            WHO CLASSIFICATION:   Normal (a T-score of -1 0 or higher)   Low bone mineral density (a T-score of less than -1 0 but higher than -2 5)   Osteoporosis (a T-score of -2 5 or less) Severe osteoporosis (a T-score of -2 5 or less with a fragility fracture)             Workstation performed: [id number]     Signed by:   nAa Paula Wilson MD   2/21/17

## 2018-01-15 NOTE — RESULT NOTES
Verified Results  (1) OCCULT BLOOD, FECAL IMMUNOCHEMICAL TEST 25Jan2017 11:03AM Aida Zarco Order Number: [id number]     Test Name Result Flag Reference   OCCULT BLD, FECAL IMMUNOLOGICAL Positive A Negative   Performed by Fecal Immunochemical Test

## 2018-01-16 NOTE — MISCELLANEOUS
Assessment    1  Chronic obstructive pulmonary disease (496) (J44 9)   2  Afib (427 31) (I48 91)   3  CHF (congestive heart failure) (428 0) (I50 9)    Plan  Chronic obstructive pulmonary disease    · Complete PFT; Status:Active; Requested for:15Apr2016;    Perform:[facility]; Due:15Apr2017; Last Updated By:Anny Brown; 4/15/2016 2:20:21 PM;Ordered; For:Chronic obstructive pulmonary disease; Ordered By:Saqib Simpson; Discussion/Summary  Discussion Summary:   The patient will follow-up with cardiology and I'll see her back in 6 weeks' time  If she develops any weight cane over 2 pounds any palpitations or shortness of breath in the interim she is to call  Otherwise, she is to follow up with pulmonology as well  Medication SE Review and Pt Understands Tx: Possible side effects of new medications were reviewed with the patient/guardian today  The treatment plan was reviewed with the patient/guardian  The patient/guardian understands and agrees with the treatment plan      Chief Complaint  Chief Complaint Free Text Note Form: pfts may 16       History of Present Illness  TCM Communication [facility]: The patient is being contacted for follow-up after hospitalization and APPOINT GIVEN FOR 4-15-16 AT 8:45 AM WITH DR Sebastian Ulrich  She was hospitalized at [facility]  The dates of hospitalization:, date of admission: 4-8-16, date of discharge: 4-10-16  Diagnosis: CHF  She was discharged to home  Medications reviewed and updated today  She scheduled a follow up appointment  Follow-up appointments with other specialists:  PULMONARY 5-19-16 DR GONZALEZ CARDIOLOGY APPOINT IN 1 WEEK  Symptoms: dizziness  The patient is currently experiencing symptoms  LIGHTHEADEDNESS Counseling was provided to patient's family  DAUGHTER BECK  Communication performed and completed by TUSHAR JOYCE LPN   HPI: The patient presents status post hospitalization for acute exacerbation of COPD, CHF and atrial fibrillation   She's currently in normal sinus rhythm CHF has resolved and she is to follow-up with both cardiology and pulmonology  She needs to have pulmonary function testing done prior to scheduling the appointment with the pulmonologist       Review of Systems  Complete-Female:   Constitutional: No fever, no chills, feels well, no tiredness, no recent weight gain or weight loss  Eyes: No complaints of eye pain, no red eyes, no eyesight problems, no discharge, no dry eyes, no itching of eyes  ENT: no complaints of earache, no loss of hearing, no nose bleeds, no nasal discharge, no sore throat, no hoarseness  Cardiovascular: No complaints of slow heart rate, no fast heart rate, no chest pain, no palpitations, no leg claudication, no lower extremity edema  Respiratory: shortness of breath during exertion and Chronic dyspnea with exertion, but No complaints of shortness of breath, no wheezing, no cough, no SOB on exertion, no orthopnea, no PND  Gastrointestinal: No complaints of abdominal pain, no constipation, no nausea or vomiting, no diarrhea, no bloody stools  Genitourinary: No complaints of dysuria, no incontinence, no pelvic pain, no dysmenorrhea, no vaginal discharge or bleeding  Musculoskeletal: No complaints of arthralgias, no myalgias, no joint swelling or stiffness, no limb pain or swelling  Integumentary: No complaints of skin rash or lesions, no itching, no skin wounds, no breast pain or lump  Neurological: No complaints of headache, no confusion, no convulsions, no numbness, no dizziness or fainting, no tingling, no limb weakness, no difficulty walking  Psychiatric: Not suicidal, no sleep disturbance, no anxiety or depression, no change in personality, no emotional problems  Endocrine: No complaints of proptosis, no hot flashes, no muscle weakness, no deepening of the voice, no feelings of weakness     Hematologic/Lymphatic: No complaints of swollen glands, no swollen glands in the neck, does not bleed easily, does not bruise easily  Active Problems    1  Acute asthmatic bronchitis (493 90) (J45 909)   2  Acute sinusitis (461 9) (J01 90)   3  Acute suppurative otitis media without spontaneous rupture of ear drum, unspecified   laterality   4  Allergic rhinitis (477 9) (J30 9)   5  Anxiety (300 00) (F41 9)   6  Chronic obstructive pulmonary disease (496) (J44 9)   7  Cough (786 2) (R05)   8  Dental disorder (525 9) (K08 9)   9  Dyspnea on exertion (786 09) (R06 09)   10  Heart disease (429 9) (I51 9)   11  Hypercholesterolemia (272 0) (E78 0)   12  Hyperglycemia (790 29) (R73 9)   13  Hypertension (401 9) (I10)   14  Hypothyroidism (244 9) (E03 9)   15  Impacted cerumen, unspecified laterality (380 4) (H61 20)   16  Lumbar radiculopathy (724 4) (M54 16)   17  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)   18  Medicare annual wellness visit, initial (V70 0) (Z00 00)   19  Medicare annual wellness visit, subsequent (V70 0) (Z00 00)   20  Need for influenza vaccination (V04 81) (Z23)   21  Need for pneumococcal vaccination (V03 82) (Z23)   22  Need for vaccination with 13-polyvalent pneumococcal conjugate vaccine (V03 82) (Z23)   23  Sciatica (724 3) (M54 30)   24  Screening for breast cancer (V76 10) (Z12 39)   25  Screening for colorectal cancer (V76 51) (Z12 11,Z12 12)   26  Screening for genitourinary condition (V81 6) (Z13 89)   27  Screening for neurological condition (V80 09) (Z13 89)   28  Tobacco abuse (305 1) (Z72 0)   29  Tobacco use (305 1) (Z72 0)    Surgical History    1  History of Heart Surgery  Surgical History Reviewed: The surgical history was reviewed and updated today  Family History    1  Family history of Essential Hypertension   2  Family history of Heart Disease (V17 49)  Family History Reviewed: The family history was reviewed and updated today         Social History    · Daily Coffee Consumption (___ Cups/Day)   · Daily Cola Consumption (___ Cans/Day)   · Marital History -    · Never A Smoker   · Personal Protective Equipment Seatbelts   · Tobacco use (305 1) (Z72 0)  Social History Reviewed: The social history was reviewed and updated today  The social history was reviewed and is unchanged  Current Meds   1  ALPRAZolam 0 25 MG Oral Tablet; take 1/2 to 1 tablet by mouth every 6 hours if needed; Therapy: 97YNH9926 to (Evaluate:03Jun2016)  Requested for: 50MVX6887; Last   Rx:34Vnc3894 Ordered   2  AmLODIPine Besylate 10 MG Oral Tablet; TAKE 1 TABLET DAILY AS DIRECTED    Requested for: 27NLH1645; Last Rx:42Zmo9663 Ordered   3  Atorvastatin Calcium 40 MG Oral Tablet; Take 1 tablet daily Recorded   4  Catrina Low Dose 81 MG Oral Tablet Delayed Release; Therapy: ([phone]) to Recorded   5  Carvedilol 25 MG Oral Tablet; Take 1 tablet twice daily; Therapy: (Recorded:21Nkh9835) to Recorded   6  Chantix 1 MG Oral Tablet; Take 1 tablet twice daily; Therapy: 16WFT8071 to ([id number])  Requested for: 49SBI5944; Last   Rx:73Eqv5887 Ordered   7  Clopidogrel Bisulfate 75 MG Oral Tablet; Therapy: (Recorded:17Haa6807) to Recorded   8  Furosemide 40 MG Oral Tablet; take 1 tablet by mouth once daily; Therapy: 29TXA2630 to (Evaluate:60Raf3865)  Requested for: 76Jom7782; Last   Rx:53Vot3050 Ordered   9  Klor-Con M20 20 MEQ Oral Tablet Extended Release; take 1 tablet by mouth once daily; Therapy: 31TBX6916 to (Ginna Seth)  Requested for: 41Zcq6181; Last   Rx:07Crg2134 Ordered   10  Levothyroxine Sodium 75 MCG Oral Tablet; take 1 tablet by mouth once daily as directed; Therapy: 17Yjv9631 to (Evaluate:02Jun2016)  Requested for: 64ZFT2407; Last    Rx:68Xru0455 Ordered   11  Pantoprazole Sodium 40 MG Oral Tablet Delayed Release; Therapy: (Recorded:55Rer1508) to Recorded   12  Potassium Chloride Anna ER 20 MEQ Oral Tablet Extended Release; take 1 tablet by    mouth once daily;     Therapy: 80Tfx0094 to (Evaluate:13Oct2016)  Requested for: 17Hod2704; Last    Rx:49Fgm6130 Ordered   13  Promethazine HCl - 25 MG Oral Tablet; TAKE 1 TABLET 3 TIMES DAILY AS NEEDED; Therapy: 76JIM1902 to (Leila Albrecht)  Requested for: 37UQG4151; Last    Rx:85Jho3115 Ordered   14  Spiriva HandiHaler 18 MCG Inhalation Capsule; INHALE 1 CAPSULE Daily; Therapy: 10LXR1242 to (Evaluate:11Oct2014); Last Rx:14Typ0403 Ordered   15  Symbicort 160-4 5 MCG/ACT Inhalation Aerosol Recorded   16  Valsartan-Hydrochlorothiazide 320-12 5 MG Oral Tablet; TAKE 1 TABLET EVERY DAY    Recorded   17  Varenicline Tartrate 0 5 MG TABS Recorded  Medication List Reviewed: The medication list was reviewed and updated today  Allergies    1  No Known Drug Allergies    Vitals  Signs [Data Includes: Current Encounter]   Recorded: 15Apr2016 08:44AM   Temperature: 95 8 F  Heart Rate: 68  Systolic: 257  Diastolic: 82  Height: 5 ft 4 in  Weight: 202 lb   BMI Calculated: 34 67  BSA Calculated: 1 97    Physical Exam    Constitutional   General appearance: No acute distress, well appearing and well nourished  Head and Face   Head and face: Normal     Palpation of the face and sinuses: No sinus tenderness  Eyes   Conjunctiva and lids: No swelling, erythema or discharge  Pupils and irises: Equal, round, reactive to light  Ophthalmoscopic examination: Normal fundi and optic discs  Ears, Nose, Mouth, and Throat   External inspection of ears and nose: Normal     Otoscopic examination: Tympanic membranes translucent with normal light reflex  Canals patent without erythema  Hearing: Normal     Nasal mucosa, septum, and turbinates: Normal without edema or erythema  Lips, teeth, and gums: Normal, good dentition  Oropharynx: Normal with no erythema, edema, exudate or lesions  Neck   Neck: Supple, symmetric, trachea midline, no masses  Thyroid: Normal, no thyromegaly      Pulmonary   Respiratory effort: No increased work of breathing or signs of respiratory distress  Auscultation of lungs: Abnormal   decreased breath sounds bilaterally bilaterally  Cardiovascular   Auscultation of heart: Normal rate and rhythm, normal S1 and S2, no murmurs  Carotid pulses: 2+ bilaterally  Pedal pulses: 2+ bilaterally  Peripheral vascular exam: Normal     Examination of extremities for edema and/or varicosities: Normal     Abdomen   Abdomen: Non-tender, no masses  Liver and spleen: No hepatomegaly or splenomegaly  Lymphatic   Palpation of lymph nodes in neck: No lymphadenopathy  Musculoskeletal   Digits and nails: Normal without clubbing or cyanosis  Skin   Skin and subcutaneous tissue: Normal without rashes or lesions  Palpation of skin and subcutaneous tissue: Normal turgor  Neurologic   Cranial nerves: Cranial nerves II-XII intact  Cortical function: Normal mental status  Reflexes: 2+ and symmetric  Psychiatric   Judgment and insight: Normal     Orientation to person, place, and time: Normal     Recent and remote memory: Intact  Mood and affect: Normal        Health Management  Screening for breast cancer   Digital Bilateral Screening Mammogram With CAD; every 1 year; Next Due: 11Jun2015; Overdue  Screening for colorectal cancer   COLONOSCOPY; every 10 years; Next Due: 11Jun2015; Overdue    Future Appointments    Date/Time Provider Specialty Site   07/14/2016 08:30 AM Nichelle Devine DO Family Medicine [facility] [address]     Signatures   Electronically signed by : Johny Badillo DO;  Apr 17 2016 10:37PM EST                       (Author)

## 2018-01-16 NOTE — PROGRESS NOTES
History of Present Illness  Care Coordination Encounter Information:   Type of Encounter: Telephonic    Spoke to Patient   NO need for care coordination services at this time  Active Problems    1  Acute asthmatic bronchitis (493 90) (J45 909)   2  Acute sinusitis (461 9) (J01 90)   3  Acute suppurative otitis media without spontaneous rupture of ear drum, unspecified   laterality   4  Afib (427 31) (I48 91)   5  Allergic rhinitis (477 9) (J30 9)   6  Anxiety (300 00) (F41 9)   7  CHF (congestive heart failure) (428 0) (I50 9)   8  Chronic obstructive pulmonary disease (496) (J44 9)   9  Cough (786 2) (R05)   10  Dental disorder (525 9) (K08 9)   11  Dyspnea on exertion (786 09) (R06 09)   12  Encounter for vaccination (V05 9) (Z23)   13  Epigastric pain (789 06) (R10 13)   14  GERD without esophagitis (530 81) (K21 9)   15  Heart disease (429 9) (I51 9)   16  Hypercholesterolemia (272 0) (E78 00)   17  Hyperglycemia (790 29) (R73 9)   18  Hypertension (401 9) (I10)   19  Hypothyroidism (244 9) (E03 9)   20  Impacted cerumen, unspecified laterality (380 4) (H61 20)   21  Loss of height (781 91) (R29 890)   22  Lumbar radiculopathy (724 4) (M54 16)   23  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)   24  Medicare annual wellness visit, initial (V70 0) (Z00 00)   25  Medicare annual wellness visit, subsequent (V70 0) (Z00 00)   26  Melena (578 1) (K92 1)   27  Need for influenza vaccination (V04 81) (Z23)   28  Need for pneumococcal vaccination (V03 82) (Z23)   29  Need for vaccination with 13-polyvalent pneumococcal conjugate vaccine (V03 82) (Z23)   30  Osteopenia (733 90) (M85 80)   31  Positive FIT (fecal immunochemical test) (792 1) (R19 5)   32  Sciatica (724 3) (M54 30)   33  Screening for breast cancer (V76 10) (Z12 39)   34  Screening for colorectal cancer (V76 51) (Z12 11,Z12 12)   35  Screening for genitourinary condition (V81 6) (Z13 89)   36   Screening for neurological condition (V80 09) (Z13 89)   37  Tobacco abuse (305 1) (Z72 0)    Surgical History    1  History of Heart Surgery    Family History  Father    1  Family history of Essential Hypertension   2  Family history of Heart Disease (V17 49)    Social History    · Denied: History of Alcohol use   · Current every day smoker (305 1) (F17 200)   · Daily Coffee Consumption (___ Cups/Day)   · Daily Cola Consumption (___ Cans/Day)   · Education Level: Less than high school   · Has 4 children   · Marital History -    · Tea   · Water intake, adequate (per day)    Current Meds    1  ALPRAZolam 0 25 MG Oral Tablet; take 1/2 to 1 tablet by mouth every 6 hours if needed; Therapy: 57XOP2661 to (Evaluate:30Aug2017)  Requested for: 52RYV5083; Last   Rx:02May2017 Ordered    2  Albuterol Sulfate (2 5 MG/3ML) 0 083% Inhalation Nebulization Solution; INHALE 1   0 083% Every 6 hours; Therapy: 28MUB5187 to (Evaluate:21Mar2017)  Requested for: 21Nov2016; Last   Rx:21Nov2016 Ordered   3  Spiriva HandiHaler 18 MCG Inhalation Capsule; INHALE 1 CAPSULE Daily; Therapy: 87IFM0673 to (Evaluate:11Oct2014); Last Rx:39Tbc2631 Ordered    4  Pantoprazole Sodium 40 MG Oral Tablet Delayed Release; TAKE 1 TABLET BID; Last   Rx:21Nov2016 Ordered    5  Klor-Con M20 20 MEQ Oral Tablet Extended Release; take 1 tablet by mouth once daily; Therapy: 28XYP1671 to (Dodie Jefferson)  Requested for: 60Ebl0959; Last   Rx:18Yqi2908 Ordered    6  Potassium Chloride Anna ER 20 MEQ Oral Tablet Extended Release; take 1 tablet by   mouth once daily; Therapy: 92EZQ5660 to [phone])  Requested for: 29HFB6633; Last   Rx:30Jan2017 Ordered    7  AmLODIPine Besylate 10 MG Oral Tablet; TAKE 1 TABLET DAILY AS DIRECTED    Requested for: 71KWX0846; Last Rx:30Jan2017 Ordered   8  Furosemide 40 MG Oral Tablet; take 1 tablet by mouth once daily;    Therapy: 83HLA6635 to )  Requested for: 08LJP9978; Last   Rx:30Jan2017 Ordered 9  Levothyroxine Sodium 100 MCG Oral Tablet; TAKE 1 TABLET DAILY; Therapy: 46GCN8146 to (Fabricio Jeromy)  Requested for: 15YUO0792; Last   Rx:25Jan2017 Ordered    10  Calcium-Vitamin D 600-200 MG-UNIT Oral Tablet; take 1 tablet by mouth twice a day; Therapy: 15Ckg0916 to (Sarah Barnes)  Requested for: 21Feb2017; Last    Rx:21Feb2017 Ordered   11  Ibandronate Sodium 150 MG Oral Tablet (Boniva); TAKE 1 TABLET ONCE MONTHLY    WITH 8 TO 10 OUNCES OF WATER   STAY UPRIGHT AND DO NOT EAT    OR DRINK FOR 1 HOUR;    Therapy: 21Feb2017 to (Renew:22Zlc4096)  Requested for: 21Feb2017; Last    Rx:21Feb2017 Ordered    12  Atorvastatin Calcium 40 MG Oral Tablet; Take 1 tablet daily Recorded   13  Catrina Low Dose 81 MG Oral Tablet Delayed Release; Therapy: (Anabel Truong) to Recorded   14  Carvedilol 25 MG Oral Tablet; Take 1 tablet twice daily; Therapy: (Recorded:11Epg3029) to Recorded   15  Clopidogrel Bisulfate 75 MG Oral Tablet; Therapy: (Recorded:30Ccy2551) to Recorded   16  Symbicort 160-4 5 MCG/ACT Inhalation Aerosol Recorded   17  Valsartan-Hydrochlorothiazide 320-12 5 MG Oral Tablet; TAKE 1 TABLET EVERY DAY    Recorded    Allergies    1  No Known Drug Allergies    Health Management   Digital Bilateral Screening Mammogram With CAD; every 1 year; Next Due: 11Jun2015; Overdue   COLONOSCOPY; every 10 years; Next Due: 11Jun2015; Overdue    End of Encounter Meds    1  ALPRAZolam 0 25 MG Oral Tablet; take 1/2 to 1 tablet by mouth every 6 hours if needed; Therapy: 92JYG6668 to (Evaluate:56Knj0239)  Requested for: 73XUW9829; Last   Rx:18Yyy0676 Ordered    2  Albuterol Sulfate (2 5 MG/3ML) 0 083% Inhalation Nebulization Solution; INHALE 1   0 083% Every 6 hours; Therapy: 66UPV4661 to (Evaluate:21Mar2017)  Requested for: 21Nov2016; Last   Rx:21Nov2016 Ordered   3  Spiriva HandiHaler 18 MCG Inhalation Capsule; INHALE 1 CAPSULE Daily; Therapy: 36CPN0052 to (Evaluate:11Oct2014);  Last Rx:11Sep2014 Ordered 4  Pantoprazole Sodium 40 MG Oral Tablet Delayed Release; TAKE 1 TABLET BID; Last   Rx:21Nov2016 Ordered    5  Klor-Con M20 20 MEQ Oral Tablet Extended Release; take 1 tablet by mouth once daily; Therapy: 92RLR0702 to (Lissette Means)  Requested for: 07Dkh8603; Last   Rx:21Jul2014 Ordered    6  Potassium Chloride Anna ER 20 MEQ Oral Tablet Extended Release; take 1 tablet by   mouth once daily; Therapy: 47HTG7293 to [phone])  Requested for: 83WLJ3526; Last   Rx:30Jan2017 Ordered    7  AmLODIPine Besylate 10 MG Oral Tablet; TAKE 1 TABLET DAILY AS DIRECTED    Requested for: 37MDR0847; Last Rx:30Jan2017 Ordered   8  Furosemide 40 MG Oral Tablet; take 1 tablet by mouth once daily; Therapy: 77IQW3513 to [phone])  Requested for: 81QTK6400; Last   Rx:30Jan2017 Ordered    9  Levothyroxine Sodium 100 MCG Oral Tablet; TAKE 1 TABLET DAILY; Therapy: 48ZJE2899 to (Damion Rucker)  Requested for: 66ADU3414; Last   Rx:25Jan2017 Ordered    10  Calcium-Vitamin D 600-200 MG-UNIT Oral Tablet; take 1 tablet by mouth twice a day; Therapy: 21Feb2017 to (Stanislav Cid)  Requested for: 21Feb2017; Last    Rx:21Feb2017 Ordered   11  Ibandronate Sodium 150 MG Oral Tablet (Boniva); TAKE 1 TABLET ONCE MONTHLY    WITH 8 TO 10 OUNCES OF WATER   STAY UPRIGHT AND DO NOT EAT    OR DRINK FOR 1 HOUR;    Therapy: 21Feb2017 to (Renew:61Tuu3787)  Requested for: 21Feb2017; Last    Rx:21Feb2017 Ordered    12  Atorvastatin Calcium 40 MG Oral Tablet; Take 1 tablet daily Recorded   13  Catrina Low Dose 81 MG Oral Tablet Delayed Release; Therapy: ([id number]) to Recorded   14  Carvedilol 25 MG Oral Tablet; Take 1 tablet twice daily; Therapy: (Recorded:03Ggs8573) to Recorded   15  Clopidogrel Bisulfate 75 MG Oral Tablet; Therapy: (Recorded:45Pqd8531) to Recorded   16   Symbicort 160-4 5 MCG/ACT Inhalation Aerosol Recorded   17  Valsartan-Hydrochlorothiazide 320-12 5 MG Oral Tablet; TAKE 1 TABLET EVERY DAY    Recorded    Future Appointments    Date/Time Provider Specialty Site   07/25/2017 08:45 AM Adelfo Chavez DO Family Medicine  [address] [facility]     Patient Care Team    Care Team Member Role Specialty Office Number   [place]  Cardiology [phone]   Sandra Falk MD  Ophthalmology [phone]   Adelita Mathis MD  Cardiology [phone]     Signatures   Electronically signed by : Gerardo Phillips RN; Jun 30 2017  9:58AM EST                       (Author)

## 2018-01-23 VITALS
HEIGHT: 63 IN | HEART RATE: 76 BPM | TEMPERATURE: 98.4 F | BODY MASS INDEX: 31.45 KG/M2 | DIASTOLIC BLOOD PRESSURE: 74 MMHG | WEIGHT: 177.5 LBS | SYSTOLIC BLOOD PRESSURE: 132 MMHG

## 2018-01-23 NOTE — PROGRESS NOTES
Assessment    1  Chronic obstructive pulmonary disease (496) (J44 9)   2  Hypercholesterolemia (272 0) (E78 00)   3  Hypertension (401 9) (I10)   4  Hypothyroidism (244 9) (E03 9)   5  Medicare annual wellness visit, subsequent (V70 0) (Z00 00)    Discussion/Summary    Ill see her back in one year;s time  Chief Complaint  MEDICARE WELLNESS PE      History of Present Illness  HPI: Pt is here for subsequent medicare wellness evaluaiton  She is up to date with all preventative studies  Welcome to [facility] and Wellness Visits: The patient is being seen for the subsequent annual wellness visit  Medicare Screening and Risk Factors   Hospitalizations: no previous hospitalizations  Medicare Screening Tests Risk Questions   Osteoporosis risk assessment: , female gender, over 48years of age and tobacco use  HIV risk assessment: none indicated  Drug and Alcohol Use: The patient currently smokes 8-10 cigarettes per day  The patient reports never drinking alcohol  She has never used illicit drugs  Diet and Physical Activity: Current diet includes well balanced meals, 1-2 servings of fruit per day, 1-2 servings of vegetables per day, 1-2 servings of meat per day, 1 servings of whole grains per day, 1-2 servings of simple carbohydrates per day, 1-2 servings of dairy products per day, 2 cups of coffee per day, 1 cups of tea per day, 0 cans of regular soda per day, 0 cans of diet soda per day and 6-8 GLASSES WATER/DAY  She exercises infrequently  Exercise: stretching  Mood Disorder and Cognitive Impairment Screening: PHQ-9 Depression Scale   Over the past 2 weeks, how often have you been bothered by the following problems? 1 ) Little interest or pleasure in doing things? Several days  2 ) Feeling down, depressed or hopeless? Several days  3 ) Trouble falling asleep or sleeping too much? Half the days or more  4 ) Feeling tired or having little energy? Several days     5 ) Poor appetite or overeating? Several days  6 ) Feeling bad about yourself, or that you are a failure, or have let yourself or your family down? Several days  7 ) Trouble concentrating on things, such as reading a newspaper or watching television? Half the days or more  8 ) Moving or speaking so slowly that other people could have noticed, or the opposite, moving or speaking faster than usual? Not at all  TOTAL SCORE: 9  How difficult have these problems made it for you to do your work, take care of things at home, or get along with people? Not at all  She reports feeling down, depressed, or hopeless over the past two weeks  She reports feeling little interest or pleasure in doing things over the past two weeks  Cognitive impairment screening: denies difficulty learning/retaining new information, denies difficulty handling complex tasks, denies difficulty with reasoning, denies difficulty with spatial ability and orientation, denies difficulty with language and denies difficulty with behavior  Functional Ability/Level of Safety: Hearing is slightly decreased, slightly decreased in the right ear and slightly decreased in the left ear  She denies hearing difficulties  She does not use a hearing aid  Activities of daily living details: transportation help needed, but does not need help using the phone, does not need help shopping, no meal preparation help needed, does not need help doing housework, does not need help doing laundry, does not need help managing medications and does not need help managing money  Injury History: no polypharmacy, no alcohol use, no mobility impairment, no antidepressant use, no deconditioning, no postural hypotension, sedative use, no visual impairment, no urinary incontinence, antihypertensive use, no cognitive impairment, up and go test was normal and no previous fall     Home safety risk factors:  no unfamiliar surroundings, no loose rugs, no poor household lighting, no uneven floors, no household clutter, grab bars in the bathroom and handrails on the stairs  Advance Directives: Advance directives: durable power of  for health care directives and advance directives, but no living will  Co-Managers and Medical Equipment/Suppliers: See Patient Care Team      Patient Care Team    Care Team Member Role Specialty Office Number   [id number] [facility]  Cardiology [phone]   Bren Norton MD  Ophthalmology [phone]   Florentino Dexter MD  Cardiology [phone]     Review of Systems    Over the past 2 weeks, how often have you been bothered by the following problems? 1 ) Little interest or pleasure in doing things? Not at all    2 ) Feeling down, depressed or hopeless? Not at all    3 ) Trouble falling asleep or sleeping too much? Not at all    4 ) Feeling tired or having little energy? Not at all    5 ) Poor appetite or overeating? Not at all    6 ) Feeling bad about yourself, or that you are a failure, or have let yourself or your family down? Not at all    7 ) Trouble concentrating on things, such as reading a newspaper or watching television? Not at all    8 ) Moving or speaking so slowly that other people could have noticed, or the opposite, moving or speaking faster than usual? Not at all    9 ) Thoughts that you would be better off dead or of hurting yourself in some way? Not at all  Score 0      Active Problems    1  Acute asthmatic bronchitis (493 90) (J45 909)   2  Acute sinusitis (461 9) (J01 90)   3  Acute suppurative otitis media without spontaneous rupture of ear drum, unspecified   laterality   4  Allergic rhinitis (477 9) (J30 9)   5  Anxiety (300 00) (F41 9)   6  CHF (congestive heart failure) (428 0) (I50 9)   7  Chronic obstructive pulmonary disease (496) (J44 9)   8  Cough (786 2) (R05)   9  Dental disorder (525 9) (K08 9)   10  Dyspnea on exertion (786 09) (R06 09)   11  Encounter for screening mammogram for breast cancer (V76 12) (Z12 31)   12  Encounter for vaccination (V05 9) (Z23)   13  Epigastric pain (789 06) (R10 13)   14  GERD without esophagitis (530 81) (K21 9)   15  Heart disease (429 9) (I51 9)   16  Hypercholesterolemia (272 0) (E78 00)   17  Hyperglycemia (790 29) (R73 9)   18  Hypertension (401 9) (I10)   19  Hypothyroidism (244 9) (E03 9)   20  Impacted cerumen, unspecified laterality (380 4) (H61 20)   21  Loss of height (781 91) (R29 890)   22  Lumbar radiculopathy (724 4) (M54 16)   23  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)   24  Medicare annual wellness visit, initial (V70 0) (Z00 00)   25  Medicare annual wellness visit, subsequent (V70 0) (Z00 00)   26  Melena (578 1) (K92 1)   27  Need for influenza vaccination (V04 81) (Z23)   28  Need for pneumococcal vaccination (V03 82) (Z23)   29  Need for vaccination with 13-polyvalent pneumococcal conjugate vaccine (V03 82) (Z23)   30  Osteopenia (733 90) (M85 80)   31  Positive FIT (fecal immunochemical test) (792 1) (R19 5)   32  Sciatica (724 3) (M54 30)   33  Screening for breast cancer (V76 10) (Z12 31)   34  Screening for colorectal cancer (V76 51) (Z12 11,Z12 12)   35  Screening for genitourinary condition (V81 6) (Z13 89)   36  Screening for neurological condition (V80 09) (Z13 89)   37  Tobacco abuse (305 1) (Z72 0)   38  Unspecified atrial fibrillation (427 31) (I48 91)    Surgical History    · History of Heart Surgery    Family History  Father    · Family history of Essential Hypertension   · Family history of Heart Disease (V17 49)    Social History    · Denied: History of Alcohol use   · Current every day smoker (305 1) (F17 200)   · Daily Coffee Consumption (___ Cups/Day)   · Daily Cola Consumption (___ Cans/Day)   · Education Level: Less than high school   · Has 4 children   · Marital History -    · Tea   · Water intake, adequate (per day)    Current Meds   1   Albuterol Sulfate (2 5 MG/3ML) 0 083% Inhalation Nebulization Solution; INHALE 1   0 083% Every 6 hours; Therapy: 75BPB3711 to (Evaluate:21Mar2017)  Requested for: 21Nov2016; Last   Rx:21Nov2016 Ordered   2  ALPRAZolam 0 25 MG Oral Tablet; take 1/2 to 1 tablet by mouth every 6 hours if needed; Therapy: 73CEO3086 to ([id number])  Requested for: 21XHH7531; Last   Rx:74Lmv6003 Ordered   3  AmLODIPine Besylate 10 MG Oral Tablet; take 1 tablet by mouth daily as directed; Therapy: 22Ptq9506 to (Evaluate:29Mar2018)  Requested for: 35GFZ2101; Last   Rx:29Nov2017 Ordered   4  Atorvastatin Calcium 40 MG Oral Tablet; Take 1 tablet daily Recorded   5  Catrina Low Dose 81 MG Oral Tablet Delayed Release; Therapy: ([id number]) to Recorded   6  Calcium-Vitamin D 600-200 MG-UNIT Oral Tablet; take 1 tablet by mouth twice a day; Therapy: 21Feb2017 to (Vannie Leak)  Requested for: 21Feb2017; Last   Rx:21Feb2017 Ordered   7  Carvedilol 25 MG Oral Tablet; Take 1 tablet twice daily; Therapy: (Recorded:04Jau6121) to Recorded   8  Clopidogrel Bisulfate 75 MG Oral Tablet; Therapy: (Recorded:40Fuk2831) to Recorded   9  Ibandronate Sodium 150 MG Oral Tablet; TAKE 1 TABLET ONCE MONTHLY WITH 8 TO   10 OUNCES OF WATER   STAY UPRIGHT AND DO NOT EAT OR DRINK FOR 1 HOUR;   Therapy: 21Feb2017 to (Vannie Leak)  Requested for: 41Qky7014; Last   Rx:21Feb2017 Ordered   10  Klor-Con M20 20 MEQ Oral Tablet Extended Release; take 1 tablet by mouth once daily; Therapy: 35CHF1741 to (Julius Brow)  Requested for: 19Kej7734; Last    Rx:50Egn0743 Ordered   11  Levothyroxine Sodium 100 MCG Oral Tablet; take 1 tablet by mouth once daily 1 tablet    by mouth once daily; Therapy: 91LBD5079 to 498 21 [phone])  Requested for: 31OYD8321; Last    Rx:02Oct2017 Ordered   12  Pantoprazole Sodium 40 MG Oral Tablet Delayed Release; TAKE 1 TABLET BID; Last    Rx:21Nov2016 Ordered   13  Spiriva HandiHaler 18 MCG Inhalation Capsule; INHALE 1 CAPSULE Daily;     Therapy: 59WRE4136 to (Evaluate:2014); Last Rx:2014 Ordered   14  Symbicort 160-4 5 MCG/ACT Inhalation Aerosol Recorded   15  Valsartan-Hydrochlorothiazide 320-12 5 MG Oral Tablet; TAKE 1 TABLET EVERY DAY    Recorded    Allergies    1  No Known Drug Allergies    Immunizations   ** Printed in Appendix #1 below  Vitals  Signs    Temperature: 98 4 F  Heart Rate: 76  Systolic: 455  Diastolic: 74  Height: 5 ft 3 in  Weight: 177 lb 8 oz  BMI Calculated: 31 44  BSA Calculated: 1 84    Health Management  Screening for breast cancer   Digital Bilateral Screening Mammogram With CAD; every 1 year; Next Due: 2015; Overdue  Screening for colorectal cancer   COLONOSCOPY; every 10 years; Next Due: 2015;  Overdue    Future Appointments    Date/Time Provider Specialty Site   04/10/2018 05:00 PM Nichelle Devine DO Family Medicine [address] [facility] FAM[facility] [address]     Signatures   Electronically signed by : Johny Badillo DO; Dec 12 2017  4:46AM EST                       (Author)    Appendix #1     Patient: Montell Canavan ; : 1940; MRN: [id number]      0 6 3 4 5 6    Influenza  24-Oct-2008  (68y) 14-Oct-2009  (69y) [id number]-[id number]  (70y) 20-Sep-2011  (71y) 22-Oct-2013  (73y) 07-Oct-2014  (74y)    PCV  [id number]  (75y)         PPSV  23-Oct-2014  (74y)         Tdap  2010  (70y)

## 2018-02-27 DIAGNOSIS — E03.9 HYPOTHYROIDISM, UNSPECIFIED TYPE: Primary | ICD-10-CM

## 2018-02-27 RX ORDER — LEVOTHYROXINE SODIUM 0.1 MG/1
1 TABLET ORAL DAILY
COMMUNITY
Start: 2011-07-14 | End: 2018-02-27 | Stop reason: SDUPTHER

## 2018-02-27 RX ORDER — LEVOTHYROXINE SODIUM 0.1 MG/1
100 TABLET ORAL DAILY
Qty: 90 TABLET | Refills: 3 | Status: SHIPPED | OUTPATIENT
Start: 2018-02-27 | End: 2019-05-06 | Stop reason: SDUPTHER

## 2018-04-04 DIAGNOSIS — J45.909 UNCOMPLICATED ASTHMA, UNSPECIFIED ASTHMA SEVERITY, UNSPECIFIED WHETHER PERSISTENT: Primary | ICD-10-CM

## 2018-04-04 RX ORDER — ALBUTEROL SULFATE 2.5 MG/3ML
2.5 SOLUTION RESPIRATORY (INHALATION) EVERY 6 HOURS
Qty: 100 VIAL | Refills: 3 | Status: SHIPPED | OUTPATIENT
Start: 2018-04-04 | End: 2019-11-04 | Stop reason: SDUPTHER

## 2018-04-04 RX ORDER — ALBUTEROL SULFATE 2.5 MG/3ML
SOLUTION RESPIRATORY (INHALATION) EVERY 6 HOURS
COMMUNITY
Start: 2016-11-21 | End: 2018-04-04 | Stop reason: SDUPTHER

## 2018-04-10 ENCOUNTER — OFFICE VISIT (OUTPATIENT)
Dept: FAMILY MEDICINE CLINIC | Facility: CLINIC | Age: 78
End: 2018-04-10
Payer: COMMERCIAL

## 2018-04-10 VITALS
WEIGHT: 172.6 LBS | BODY MASS INDEX: 29.47 KG/M2 | SYSTOLIC BLOOD PRESSURE: 138 MMHG | HEIGHT: 64 IN | TEMPERATURE: 98.6 F | DIASTOLIC BLOOD PRESSURE: 72 MMHG | RESPIRATION RATE: 16 BRPM | HEART RATE: 74 BPM

## 2018-04-10 DIAGNOSIS — E03.9 HYPOTHYROIDISM, UNSPECIFIED TYPE: ICD-10-CM

## 2018-04-10 DIAGNOSIS — M54.50 LUMBAR BACK PAIN: ICD-10-CM

## 2018-04-10 DIAGNOSIS — I10 ESSENTIAL HYPERTENSION: Primary | ICD-10-CM

## 2018-04-10 DIAGNOSIS — E78.00 HYPERCHOLESTEROLEMIA: ICD-10-CM

## 2018-04-10 PROCEDURE — 1101F PT FALLS ASSESS-DOCD LE1/YR: CPT | Performed by: FAMILY MEDICINE

## 2018-04-10 PROCEDURE — 99214 OFFICE O/P EST MOD 30 MIN: CPT | Performed by: FAMILY MEDICINE

## 2018-04-10 RX ORDER — ACETAMINOPHEN 500 MG
500 TABLET ORAL EVERY 6 HOURS
COMMUNITY
Start: 2016-04-10

## 2018-04-10 RX ORDER — PANTOPRAZOLE SODIUM 40 MG/1
1 TABLET, DELAYED RELEASE ORAL 2 TIMES DAILY
COMMUNITY
End: 2018-05-15 | Stop reason: SDUPTHER

## 2018-04-10 RX ORDER — NIACIN 1000 MG/1
1000 TABLET, EXTENDED RELEASE ORAL
COMMUNITY

## 2018-04-10 RX ORDER — GLUC/MSM/COLGN2/HYAL/ANTIARTH3 375-375-20
1 TABLET ORAL 2 TIMES DAILY
COMMUNITY
Start: 2017-02-21 | End: 2020-10-28

## 2018-04-10 RX ORDER — ALPRAZOLAM 0.25 MG/1
TABLET ORAL
COMMUNITY
Start: 2012-10-26 | End: 2018-06-25 | Stop reason: SDUPTHER

## 2018-04-10 RX ORDER — MELOXICAM 15 MG/1
15 TABLET ORAL DAILY
Qty: 30 TABLET | Refills: 3 | Status: SHIPPED | OUTPATIENT
Start: 2018-04-10 | End: 2019-06-03 | Stop reason: SDUPTHER

## 2018-04-10 RX ORDER — AMLODIPINE BESYLATE 10 MG/1
TABLET ORAL
Refills: 0 | COMMUNITY
Start: 2018-04-04 | End: 2018-05-15 | Stop reason: SDUPTHER

## 2018-04-10 RX ORDER — LEVOTHYROXINE SODIUM 0.1 MG/1
100 TABLET ORAL
COMMUNITY
End: 2018-04-10 | Stop reason: ALTCHOICE

## 2018-04-10 RX ORDER — CLOPIDOGREL BISULFATE 75 MG/1
75 TABLET ORAL DAILY
Refills: 0 | COMMUNITY
Start: 2018-04-04

## 2018-04-10 RX ORDER — VALSARTAN AND HYDROCHLOROTHIAZIDE 320; 12.5 MG/1; MG/1
TABLET, FILM COATED ORAL
Refills: 0 | COMMUNITY
Start: 2018-03-01 | End: 2018-08-14 | Stop reason: ALTCHOICE

## 2018-04-10 RX ORDER — ATORVASTATIN CALCIUM 40 MG/1
1 TABLET, FILM COATED ORAL DAILY
COMMUNITY
End: 2020-07-20

## 2018-04-10 RX ORDER — ASPIRIN 81 MG/1
TABLET ORAL
COMMUNITY

## 2018-04-10 RX ORDER — FUROSEMIDE 40 MG/1
40 TABLET ORAL
COMMUNITY
Start: 2014-02-21 | End: 2018-05-15 | Stop reason: SDUPTHER

## 2018-04-10 RX ORDER — CARVEDILOL 25 MG/1
25 TABLET ORAL 2 TIMES DAILY WITH MEALS
Refills: 0 | COMMUNITY
Start: 2018-02-27 | End: 2020-07-20

## 2018-04-10 NOTE — PROGRESS NOTES
Patient ID: Juanita Armstrong is a 68 y o  female  HPI: 68 y  o female presents for followup of htn, hypercholesterolemia and hypothyroidism  She is due for labwork at this time  She complains of stiffness and achiness of the low back that is not helped with tylenol  SUBJECTIVE    Family History   Problem Relation Age of Onset    Hypertension Father      essential     Heart disease Father      Social History     Social History    Marital status:      Spouse name: N/A    Number of children: 4    Years of education: less than high school     Occupational History    Not on file  Social History Main Topics    Smoking status: Current Every Day Smoker    Smokeless tobacco: Not on file    Alcohol use No    Drug use: Unknown    Sexual activity: Not on file     Other Topics Concern    Not on file     Social History Narrative    Daily coffee consumption:    Daily cola consumption:     Tea    Water intake, adequate (per day)     No past medical history on file    Past Surgical History:   Procedure Laterality Date    CARDIAC SURGERY       No Known Allergies    Current Outpatient Prescriptions:     acetaminophen (TYLENOL) 500 mg tablet, Take 500 mg by mouth every 6 (six) hours, Disp: , Rfl:     albuterol (2 5 mg/3 mL) 0 083 % nebulizer solution, Take 3 mL (2 5 mg total) by nebulization every 6 (six) hours, Disp: 100 vial, Rfl: 3    ALPRAZolam (XANAX) 0 25 mg tablet, Take by mouth, Disp: , Rfl:     amLODIPine (NORVASC) 10 mg tablet, , Disp: , Rfl: 0    aspirin (ELISE LOW DOSE) 81 mg EC tablet, Take by mouth, Disp: , Rfl:     atorvastatin (LIPITOR) 40 mg tablet, Take 1 tablet by mouth daily, Disp: , Rfl:     Calcium Carbonate-Vitamin D 600-200 MG-UNIT CAPS, Take 1 tablet by mouth 2 (two) times a day, Disp: , Rfl:     carvedilol (COREG) 25 mg tablet, Take 25 mg by mouth 2 (two) times a day with meals, Disp: , Rfl: 0    clopidogrel (PLAVIX) 75 mg tablet, , Disp: , Rfl: 0    furosemide (LASIX) 40 mg tablet, Take 40 mg by mouth, Disp: , Rfl:     levothyroxine 100 mcg tablet, Take 1 tablet (100 mcg total) by mouth daily, Disp: 90 tablet, Rfl: 3    niacin (NIASPAN) 1000 MG CR tablet, Take 1,000 mg by mouth, Disp: , Rfl:     pantoprazole (PROTONIX) 40 mg tablet, Take 1 tablet by mouth 2 (two) times a day, Disp: , Rfl:     tiotropium (SPIRIVA HANDIHALER) 18 mcg inhalation capsule, Place 1 capsule into inhaler and inhale daily, Disp: , Rfl:     valsartan-hydrochlorothiazide (DIOVAN-HCT) 320-12 5 MG per tablet, , Disp: , Rfl: 0    meloxicam (MOBIC) 15 mg tablet, Take 1 tablet (15 mg total) by mouth daily for 30 days, Disp: 30 tablet, Rfl: 3    Review of Systems  Constitutional:     Denies fever, chills ,fatigue ,weakness ,weight loss, weight gain     ENT: Denies earache ,loss of hearing ,nosebleed, nasal discharge,nasal congestion ,sore throat ,hoarseness  Pulmonary: Denies shortness of breath ,cough  ,dyspnea on exertion, orthopnea  ,PND   Cardiovascular:  Denies bradycardia , tachycardia  ,palpations, lower extremity edema leg, claudication  Breast:  Denies new or changing breast lumps ,nipple discharge ,nipple changes  Abdomen:  Denies abdominal pain , anorexia , indigestion, nausea, vomiting, constipation, diarrhea  Musculoskeletal: Denies myalgias,  joint swelling,  , limb pain, limb swelling+ lumbar back pain and stiffness  Gu: denies dysuria, polyuria  Skin: Denies skin rash, skin lesion, skin wound, itching, dry skin  Neuro: Denies headache, numbness, tingling, confusion, loss of consciousness, dizziness, vertigo  Psychiatric: Denies feelings of depression, suicidal ideation, anxiety, sleep disturbances    OBJECTIVE    Constitutional:   NAD, well appearing and well nourished      ENT:   Conjunctiva and lids: no injection, edema, or discharge     Pupils and iris: MILADY bilaterally    External inspection of ears and nose: normal without deformities or discharge        Otoscopic exam: Canals patent without erythema  Nasal mucosa, septum and turbinates: Normal or edema or discharge         Oropharynx:  Moist mucosa, normal tongue and tonsils without lesions  No erythema        Pulmonary:Respiratory effort normal rate and rhythm, no increased work of breathing  Auscultation of lungs:  Clear bilaterally with no adventitious breath sounds       Cardiovascular: regular rate and rhythm, S1 and S2, no murmur, no edema and/or varicosities of LE      Abdomen: Soft and non-distended     Positive bowel sounds      No heptomegaly or splenomegaly      Gu: no suprapubic tenderness or CVA tenderness, no urethral discharge  Lymphatic:  No anterior or posterior cervical lymphadenopathy         Musculoskeletal:  Gait and station: Normal gait      Digits and nails normal without clubbing or cyanosis       Inspection/palpation of joints, bones, and muscles:  No joint tenderness, swelling, full active and passive range of motion       Skin: Normal skin turgor and no rashes      Neuro:      Normal reflexes     Psych:   alert and oriented to person, place and time     normal mood and affect       Assessment/Plan:Diagnoses and all orders for this visit:    Hypercholesterolemia  -     Lipid Panel with Direct LDL reflex; Future    Essential hypertension  -     Comprehensive metabolic panel; Future    Hypothyroidism, unspecified type  -     TSH, 3rd generation; Future    Lumbar back pain  -     meloxicam (MOBIC) 15 mg tablet; Take 1 tablet (15 mg total) by mouth daily for 30 days    Other orders  -     acetaminophen (TYLENOL) 500 mg tablet; Take 500 mg by mouth every 6 (six) hours  -     ALPRAZolam (XANAX) 0 25 mg tablet; Take by mouth  -     amLODIPine (NORVASC) 10 mg tablet;   -     aspirin (ELISE LOW DOSE) 81 mg EC tablet; Take by mouth  -     atorvastatin (LIPITOR) 40 mg tablet; Take 1 tablet by mouth daily  -     Calcium Carbonate-Vitamin D 600-200 MG-UNIT CAPS;  Take 1 tablet by mouth 2 (two) times a day  -     carvedilol (COREG) 25 mg tablet; Take 25 mg by mouth 2 (two) times a day with meals  -     clopidogrel (PLAVIX) 75 mg tablet;   -     furosemide (LASIX) 40 mg tablet; Take 40 mg by mouth  -     niacin (NIASPAN) 1000 MG CR tablet; Take 1,000 mg by mouth  -     pantoprazole (PROTONIX) 40 mg tablet; Take 1 tablet by mouth 2 (two) times a day  -     tiotropium (SPIRIVA HANDIHALER) 18 mcg inhalation capsule; Place 1 capsule into inhaler and inhale daily  -     valsartan-hydrochlorothiazide (DIOVAN-HCT) 320-12 5 MG per tablet;   -     Discontinue: levothyroxine 100 mcg tablet; Take 100 mcg by mouth    I will see patient back in 4 mos or sooner prn

## 2018-04-23 ENCOUNTER — APPOINTMENT (OUTPATIENT)
Dept: LAB | Facility: CLINIC | Age: 78
End: 2018-04-23
Payer: COMMERCIAL

## 2018-04-23 ENCOUNTER — TRANSCRIBE ORDERS (OUTPATIENT)
Dept: LAB | Facility: CLINIC | Age: 78
End: 2018-04-23

## 2018-04-23 DIAGNOSIS — E03.9 HYPOTHYROIDISM, UNSPECIFIED TYPE: ICD-10-CM

## 2018-04-23 DIAGNOSIS — I10 ESSENTIAL HYPERTENSION: ICD-10-CM

## 2018-04-23 DIAGNOSIS — E78.00 HYPERCHOLESTEROLEMIA: ICD-10-CM

## 2018-04-23 LAB
ALBUMIN SERPL BCP-MCNC: 3.8 G/DL (ref 3.5–5)
ALP SERPL-CCNC: 71 U/L (ref 46–116)
ALT SERPL W P-5'-P-CCNC: 20 U/L (ref 12–78)
ANION GAP SERPL CALCULATED.3IONS-SCNC: 6 MMOL/L (ref 4–13)
AST SERPL W P-5'-P-CCNC: 19 U/L (ref 5–45)
BILIRUB SERPL-MCNC: 0.44 MG/DL (ref 0.2–1)
BUN SERPL-MCNC: 20 MG/DL (ref 5–25)
CALCIUM SERPL-MCNC: 9.3 MG/DL (ref 8.3–10.1)
CHLORIDE SERPL-SCNC: 104 MMOL/L (ref 100–108)
CHOLEST SERPL-MCNC: 177 MG/DL (ref 50–200)
CO2 SERPL-SCNC: 30 MMOL/L (ref 21–32)
CREAT SERPL-MCNC: 1.04 MG/DL (ref 0.6–1.3)
GFR SERPL CREATININE-BSD FRML MDRD: 52 ML/MIN/1.73SQ M
GLUCOSE P FAST SERPL-MCNC: 101 MG/DL (ref 65–99)
HDLC SERPL-MCNC: 57 MG/DL (ref 40–60)
LDLC SERPL CALC-MCNC: 76 MG/DL (ref 0–100)
POTASSIUM SERPL-SCNC: 3.9 MMOL/L (ref 3.5–5.3)
PROT SERPL-MCNC: 7.8 G/DL (ref 6.4–8.2)
SODIUM SERPL-SCNC: 140 MMOL/L (ref 136–145)
TRIGL SERPL-MCNC: 221 MG/DL
TSH SERPL DL<=0.05 MIU/L-ACNC: 1.39 UIU/ML (ref 0.36–3.74)

## 2018-04-23 PROCEDURE — 80061 LIPID PANEL: CPT

## 2018-04-23 PROCEDURE — 84443 ASSAY THYROID STIM HORMONE: CPT

## 2018-04-23 PROCEDURE — 36415 COLL VENOUS BLD VENIPUNCTURE: CPT

## 2018-04-23 PROCEDURE — 80053 COMPREHEN METABOLIC PANEL: CPT

## 2018-05-15 DIAGNOSIS — K21.9 GASTROESOPHAGEAL REFLUX DISEASE WITHOUT ESOPHAGITIS: ICD-10-CM

## 2018-05-15 DIAGNOSIS — I10 ESSENTIAL HYPERTENSION: Primary | ICD-10-CM

## 2018-05-15 DIAGNOSIS — R60.9 EDEMA, UNSPECIFIED TYPE: ICD-10-CM

## 2018-05-15 RX ORDER — PANTOPRAZOLE SODIUM 40 MG/1
40 TABLET, DELAYED RELEASE ORAL 2 TIMES DAILY
Qty: 180 TABLET | Refills: 3 | Status: SHIPPED | OUTPATIENT
Start: 2018-05-15 | End: 2019-08-06 | Stop reason: SDUPTHER

## 2018-05-15 RX ORDER — AMLODIPINE BESYLATE 10 MG/1
10 TABLET ORAL DAILY
Qty: 90 TABLET | Refills: 3 | Status: SHIPPED | OUTPATIENT
Start: 2018-05-15 | End: 2019-09-25

## 2018-05-15 RX ORDER — FUROSEMIDE 40 MG/1
40 TABLET ORAL 2 TIMES DAILY
Qty: 180 TABLET | Refills: 3 | Status: SHIPPED | OUTPATIENT
Start: 2018-05-15 | End: 2020-04-02 | Stop reason: SDUPTHER

## 2018-06-25 DIAGNOSIS — F41.9 ANXIETY: Primary | ICD-10-CM

## 2018-06-25 RX ORDER — ALPRAZOLAM 0.25 MG/1
TABLET ORAL
Qty: 60 TABLET | Refills: 3 | Status: SHIPPED | OUTPATIENT
Start: 2018-06-25 | End: 2019-01-29 | Stop reason: SDUPTHER

## 2018-07-24 ENCOUNTER — TELEPHONE (OUTPATIENT)
Dept: FAMILY MEDICINE CLINIC | Facility: CLINIC | Age: 78
End: 2018-07-24

## 2018-07-24 NOTE — TELEPHONE ENCOUNTER
She would like samples or refill on Symbicort inhaler, I do not see samples, can you put in, wasn't sure of strength  [facility] [place] Orbie Mattock NKDA

## 2018-08-14 ENCOUNTER — OFFICE VISIT (OUTPATIENT)
Dept: FAMILY MEDICINE CLINIC | Facility: CLINIC | Age: 78
End: 2018-08-14
Payer: COMMERCIAL

## 2018-08-14 VITALS
SYSTOLIC BLOOD PRESSURE: 132 MMHG | DIASTOLIC BLOOD PRESSURE: 72 MMHG | TEMPERATURE: 97 F | RESPIRATION RATE: 16 BRPM | HEART RATE: 74 BPM | WEIGHT: 172.3 LBS | HEIGHT: 64 IN | BODY MASS INDEX: 29.41 KG/M2

## 2018-08-14 DIAGNOSIS — I10 ESSENTIAL HYPERTENSION: ICD-10-CM

## 2018-08-14 DIAGNOSIS — E03.9 HYPOTHYROIDISM, UNSPECIFIED TYPE: ICD-10-CM

## 2018-08-14 DIAGNOSIS — E78.00 HYPERCHOLESTEROLEMIA: ICD-10-CM

## 2018-08-14 DIAGNOSIS — J06.9 UPPER RESPIRATORY TRACT INFECTION, UNSPECIFIED TYPE: Primary | ICD-10-CM

## 2018-08-14 DIAGNOSIS — J30.89 ALLERGIC RHINITIS DUE TO OTHER ALLERGIC TRIGGER, UNSPECIFIED SEASONALITY: ICD-10-CM

## 2018-08-14 PROCEDURE — 3078F DIAST BP <80 MM HG: CPT | Performed by: FAMILY MEDICINE

## 2018-08-14 PROCEDURE — 3008F BODY MASS INDEX DOCD: CPT | Performed by: FAMILY MEDICINE

## 2018-08-14 PROCEDURE — 3075F SYST BP GE 130 - 139MM HG: CPT | Performed by: FAMILY MEDICINE

## 2018-08-14 PROCEDURE — 99214 OFFICE O/P EST MOD 30 MIN: CPT | Performed by: FAMILY MEDICINE

## 2018-08-14 RX ORDER — AZITHROMYCIN 250 MG/1
TABLET, FILM COATED ORAL
Qty: 6 TABLET | Refills: 0 | Status: SHIPPED | OUTPATIENT
Start: 2018-08-14 | End: 2018-08-18

## 2018-08-14 RX ORDER — FLUTICASONE PROPIONATE 50 MCG
1 SPRAY, SUSPENSION (ML) NASAL DAILY
Qty: 16 G | Refills: 3 | Status: SHIPPED | OUTPATIENT
Start: 2018-08-14 | End: 2019-06-03 | Stop reason: SDUPTHER

## 2018-08-14 RX ORDER — CANDESARTAN CILEXETIL AND HYDROCHLOROTHIAZIDE 32; 12.5 MG/1; MG/1
1 TABLET ORAL DAILY
COMMUNITY
End: 2020-07-20

## 2018-08-14 RX ORDER — DEXTROMETHORPHAN HYDROBROMIDE AND PROMETHAZINE HYDROCHLORIDE 15; 6.25 MG/5ML; MG/5ML
5 SYRUP ORAL 4 TIMES DAILY PRN
Qty: 180 ML | Refills: 0 | Status: SHIPPED | OUTPATIENT
Start: 2018-08-14 | End: 2018-10-12 | Stop reason: ALTCHOICE

## 2018-08-14 RX ORDER — PREDNISONE 10 MG/1
TABLET ORAL
Qty: 26 TABLET | Refills: 0 | Status: SHIPPED | OUTPATIENT
Start: 2018-08-14 | End: 2018-10-12 | Stop reason: ALTCHOICE

## 2018-08-15 NOTE — PROGRESS NOTES
Patient ID: Dominga Wells is a 66 y o  female  HPI: 66 y  o female presenting with 14 day history of sore throat, nasal congestion, ear pain,pnd and cough  Pt denies any fever, chills, or body aches  Symptoms x 2 weeks  She is also here for follow up of htn, hypercholesterolemia and hypothyroidism and is due for labs  SUBJECTIVE    Family History   Problem Relation Age of Onset    Hypertension Father         essential     Heart disease Father      Social History     Social History    Marital status:      Spouse name: N/A    Number of children: 4    Years of education: less than high school     Occupational History    Not on file  Social History Main Topics    Smoking status: Current Every Day Smoker    Smokeless tobacco: Not on file    Alcohol use No    Drug use: Unknown    Sexual activity: Not on file     Other Topics Concern    Not on file     Social History Narrative    Daily coffee consumption:    Daily cola consumption:     Tea    Water intake, adequate (per day)     No past medical history on file    Past Surgical History:   Procedure Laterality Date    CARDIAC SURGERY       No Known Allergies    Current Outpatient Prescriptions:     acetaminophen (TYLENOL) 500 mg tablet, Take 500 mg by mouth every 6 (six) hours, Disp: , Rfl:     albuterol (2 5 mg/3 mL) 0 083 % nebulizer solution, Take 3 mL (2 5 mg total) by nebulization every 6 (six) hours, Disp: 100 vial, Rfl: 3    ALPRAZolam (XANAX) 0 25 mg tablet, take 1/2 to 1 tablet by mouth every 6 hours if needed, Disp: 60 tablet, Rfl: 3    amLODIPine (NORVASC) 10 mg tablet, Take 1 tablet (10 mg total) by mouth daily, Disp: 90 tablet, Rfl: 3    aspirin (ELISE LOW DOSE) 81 mg EC tablet, Take by mouth, Disp: , Rfl:     atorvastatin (LIPITOR) 40 mg tablet, Take 1 tablet by mouth daily, Disp: , Rfl:     Calcium Carbonate-Vitamin D 600-200 MG-UNIT CAPS, Take 1 tablet by mouth 2 (two) times a day, Disp: , Rfl:    candesartan-hydrochlorothiazide (ATACAND HCT) 32-12 5 MG per tablet, Take 1 tablet by mouth daily, Disp: , Rfl:     carvedilol (COREG) 25 mg tablet, Take 25 mg by mouth 2 (two) times a day with meals, Disp: , Rfl: 0    clopidogrel (PLAVIX) 75 mg tablet, , Disp: , Rfl: 0    furosemide (LASIX) 40 mg tablet, Take 1 tablet (40 mg total) by mouth 2 (two) times a day, Disp: 180 tablet, Rfl: 3    levothyroxine 100 mcg tablet, Take 1 tablet (100 mcg total) by mouth daily, Disp: 90 tablet, Rfl: 3    niacin (NIASPAN) 1000 MG CR tablet, Take 1,000 mg by mouth, Disp: , Rfl:     pantoprazole (PROTONIX) 40 mg tablet, Take 1 tablet (40 mg total) by mouth 2 (two) times a day, Disp: 180 tablet, Rfl: 3    tiotropium (SPIRIVA HANDIHALER) 18 mcg inhalation capsule, Place 1 capsule into inhaler and inhale daily, Disp: , Rfl:     azithromycin (ZITHROMAX) 250 mg tablet, Take 2 tablets today then 1 tablet daily x 4 days, Disp: 6 tablet, Rfl: 0    fluticasone (FLONASE) 50 mcg/act nasal spray, 1 spray into each nostril daily, Disp: 16 g, Rfl: 3    meloxicam (MOBIC) 15 mg tablet, Take 1 tablet (15 mg total) by mouth daily for 30 days, Disp: 30 tablet, Rfl: 3    predniSONE 10 mg tablet, 3 tabs po bid x2 days, then 2 tabs po bid x2 days, then 1 tab bid x2 days, then 1 daily until done , Disp: 26 tablet, Rfl: 0    promethazine-dextromethorphan (PHENERGAN-DM) 6 25-15 mg/5 mL oral syrup, Take 5 mL by mouth 4 (four) times a day as needed for cough, Disp: 180 mL, Rfl: 0    Review of Systems  Constitutional:     Denies fever, chills ,fatigue ,weakness ,weight loss, weight gain     ENT: Denies loss of hearing ,nosebleed, nasal discharge ,hoarseness; but admits to nasal congestion , sore throat and ear pain      Pulmonary: Denies shortness of breath ,dyspnea on exertion, orthopnea ; but admits to cough and pnd  Cardiovascular:  Denies bradycardia , tachycardia  ,palpations, lower extremity edema leg, claudication  Breast:  Denies new or changing breast lumps ,nipple discharge ,nipple changes  Abdomen:  Denies abdominal pain , anorexia , indigestion, nausea, vomiting, constipation, diarrhea  Musculoskeletal: Denies myalgias, arthralgias, joint swelling, joint stiffness , limb pain, limb swelling  Lymph: + swollen glands  Gu: Denies polyuria or dysuria  Skin: Denies skin rash, skin lesion, skin wound, itching, dry skin  Neuro: Denies headache, numbness, tingling, confusion, loss of consciousness, dizziness, vertigo  Psychiatric: Denies feelings of depression, suicidal ideation, anxiety, sleep disturbances    OBJECTIVE  /72   Pulse 74   Temp (!) 97 °F (36 1 °C)   Resp 16   Ht 5' 4" (1 626 m)   Wt 78 2 kg (172 lb 4 8 oz)   BMI 29 58 kg/m²   Constitutional:   NAD, well appearing and well nourished     ENT:   Conjunctiva and lids: no injection, edema, or discharge    Pupils and iris: MILADY bilaterally  External inspection of ears and nose: normal without deformities or discharge  Otoscopic exam: Canals patent without erythema, tm dull and erythematous, effusions bilaterally   Nasal mucosa, septum and turbinates: + turbinate injection, nasal discharge         Oropharynx:  Moist mucosa, normal tongue and tonsils without lesions  + erythema and injection of posterior pharynx with pnd    Pulmonary:Respiratory effort normal rate and rhythm, no increased work of breathing   Auscultation of lungs:  Clear bilaterally with no adventitious breath sounds     Cardiovascular: regular rate and rhythm, S1 and S2, no murmur, no edema and/or varicosities of LE     Abdomen: Soft and nontender with + bowel sounds  No heptomegaly or splenomegaly     Gu: no suprapubic tenderness or CVA tenderness  Lymphatic: + anterior  cervical lymphadenopathy      Musculoskeletal:  Gait and station: Normal gait      Digits and nails normal without clubbing or cyanosis      Inspection/palpation of joints, bones, and muscles:  No joint tenderness, swelling, full active and passive range of motion       Skin: Normal skin turgor and no rashes      Neuro:    Normal reflexes  Pych:   alert and oriented to person, place and time     normal mood and affect      Assessment/Plan:Diagnoses and all orders for this visit:    Upper respiratory tract infection, unspecified type  -     azithromycin (ZITHROMAX) 250 mg tablet; Take 2 tablets today then 1 tablet daily x 4 days  -     predniSONE 10 mg tablet; 3 tabs po bid x2 days, then 2 tabs po bid x2 days, then 1 tab bid x2 days, then 1 daily until done  -     promethazine-dextromethorphan (PHENERGAN-DM) 6 25-15 mg/5 mL oral syrup; Take 5 mL by mouth 4 (four) times a day as needed for cough    Hypercholesterolemia  -     Lipid Panel with Direct LDL reflex; Future    Hypothyroidism, unspecified type  -     TSH, 3rd generation; Future    Essential hypertension  -     Comprehensive metabolic panel; Future    Allergic rhinitis due to other allergic trigger, unspecified seasonality  -     fluticasone (FLONASE) 50 mcg/act nasal spray; 1 spray into each nostril daily    Other orders  -     candesartan-hydrochlorothiazide (ATACAND HCT) 32-12 5 MG per tablet; Take 1 tablet by mouth daily        Reviewed with patient plan to treat with plan as above  Patient instructed to call in 72 hours if not feeling better or if symptoms worsen  Pt to have labs drawn in 2 weeks

## 2018-08-20 ENCOUNTER — TELEPHONE (OUTPATIENT)
Dept: FAMILY MEDICINE CLINIC | Facility: CLINIC | Age: 78
End: 2018-08-20

## 2018-08-20 NOTE — TELEPHONE ENCOUNTER
She is a lot better  Still has a little cough, she finished the cough med you gave, can she take otc store brand max strength mucus relief cough med? She has 3 days left of the Prednisone  Pl adv

## 2018-09-06 ENCOUNTER — TELEPHONE (OUTPATIENT)
Dept: FAMILY MEDICINE CLINIC | Facility: CLINIC | Age: 78
End: 2018-09-06

## 2018-09-06 NOTE — TELEPHONE ENCOUNTER
She has 3 refills on Albuterol but [facility] wont fill it, they told her you need to fill out a form, she doesn't know what that means, she had to go through this last time also, can someone call [facility]

## 2018-09-07 ENCOUNTER — APPOINTMENT (OUTPATIENT)
Dept: LAB | Facility: CLINIC | Age: 78
End: 2018-09-07
Payer: COMMERCIAL

## 2018-09-07 DIAGNOSIS — E03.9 HYPOTHYROIDISM, UNSPECIFIED TYPE: ICD-10-CM

## 2018-09-07 DIAGNOSIS — E78.00 HYPERCHOLESTEROLEMIA: ICD-10-CM

## 2018-09-07 DIAGNOSIS — I10 ESSENTIAL HYPERTENSION: ICD-10-CM

## 2018-09-07 LAB
ALBUMIN SERPL BCP-MCNC: 3.5 G/DL (ref 3.5–5)
ALP SERPL-CCNC: 72 U/L (ref 46–116)
ALT SERPL W P-5'-P-CCNC: 26 U/L (ref 12–78)
ANION GAP SERPL CALCULATED.3IONS-SCNC: 4 MMOL/L (ref 4–13)
AST SERPL W P-5'-P-CCNC: 22 U/L (ref 5–45)
BILIRUB SERPL-MCNC: 0.36 MG/DL (ref 0.2–1)
BUN SERPL-MCNC: 32 MG/DL (ref 5–25)
CALCIUM SERPL-MCNC: 9.1 MG/DL (ref 8.3–10.1)
CHLORIDE SERPL-SCNC: 100 MMOL/L (ref 100–108)
CHOLEST SERPL-MCNC: 193 MG/DL (ref 50–200)
CO2 SERPL-SCNC: 31 MMOL/L (ref 21–32)
CREAT SERPL-MCNC: 1.19 MG/DL (ref 0.6–1.3)
GFR SERPL CREATININE-BSD FRML MDRD: 44 ML/MIN/1.73SQ M
GLUCOSE P FAST SERPL-MCNC: 94 MG/DL (ref 65–99)
HDLC SERPL-MCNC: 59 MG/DL (ref 40–60)
LDLC SERPL CALC-MCNC: 85 MG/DL (ref 0–100)
POTASSIUM SERPL-SCNC: 4 MMOL/L (ref 3.5–5.3)
PROT SERPL-MCNC: 7.8 G/DL (ref 6.4–8.2)
SODIUM SERPL-SCNC: 135 MMOL/L (ref 136–145)
TRIGL SERPL-MCNC: 244 MG/DL
TSH SERPL DL<=0.05 MIU/L-ACNC: 3.97 UIU/ML (ref 0.36–3.74)

## 2018-09-07 PROCEDURE — 84443 ASSAY THYROID STIM HORMONE: CPT

## 2018-09-07 PROCEDURE — 80053 COMPREHEN METABOLIC PANEL: CPT

## 2018-09-07 PROCEDURE — 80061 LIPID PANEL: CPT

## 2018-09-07 PROCEDURE — 36415 COLL VENOUS BLD VENIPUNCTURE: CPT

## 2018-10-12 ENCOUNTER — OFFICE VISIT (OUTPATIENT)
Dept: FAMILY MEDICINE CLINIC | Facility: CLINIC | Age: 78
End: 2018-10-12
Payer: COMMERCIAL

## 2018-10-12 VITALS
OXYGEN SATURATION: 95 % | DIASTOLIC BLOOD PRESSURE: 82 MMHG | HEART RATE: 64 BPM | HEIGHT: 64 IN | WEIGHT: 172 LBS | TEMPERATURE: 98.6 F | SYSTOLIC BLOOD PRESSURE: 128 MMHG | BODY MASS INDEX: 29.37 KG/M2

## 2018-10-12 DIAGNOSIS — J20.9 ACUTE BRONCHITIS, UNSPECIFIED ORGANISM: Primary | ICD-10-CM

## 2018-10-12 PROCEDURE — 99213 OFFICE O/P EST LOW 20 MIN: CPT | Performed by: FAMILY MEDICINE

## 2018-10-12 RX ORDER — PREDNISONE 10 MG/1
TABLET ORAL
Qty: 26 TABLET | Refills: 0 | Status: SHIPPED | OUTPATIENT
Start: 2018-10-12 | End: 2018-11-15 | Stop reason: SDUPTHER

## 2018-10-12 RX ORDER — CEPHALEXIN 500 MG/1
500 CAPSULE ORAL EVERY 8 HOURS SCHEDULED
Qty: 30 CAPSULE | Refills: 0 | Status: SHIPPED | OUTPATIENT
Start: 2018-10-12 | End: 2018-10-22

## 2018-10-12 RX ORDER — BROMPHENIRAMINE MALEATE, PSEUDOEPHEDRINE HYDROCHLORIDE, AND DEXTROMETHORPHAN HYDROBROMIDE 2; 30; 10 MG/5ML; MG/5ML; MG/5ML
5 SYRUP ORAL 4 TIMES DAILY PRN
Qty: 180 ML | Refills: 0 | Status: SHIPPED | OUTPATIENT
Start: 2018-10-12 | End: 2018-11-15 | Stop reason: SDUPTHER

## 2018-10-12 NOTE — PROGRESS NOTES
Patient ID: Brea Case is a 66 y o  female  HPI: 66 y  o female presenting with symptoms of chest congestion, cough and wheezing  Symptoms for the past one week    SUBJECTIVE    Family History   Problem Relation Age of Onset    Hypertension Father         essential     Heart disease Father      Social History     Social History    Marital status:      Spouse name: N/A    Number of children: 4    Years of education: less than high school     Occupational History    Not on file  Social History Main Topics    Smoking status: Current Every Day Smoker    Smokeless tobacco: Never Used    Alcohol use No    Drug use: No    Sexual activity: Not on file     Other Topics Concern    Not on file     Social History Narrative    Daily coffee consumption:    Daily cola consumption:     Tea    Water intake, adequate (per day)     No past medical history on file    Past Surgical History:   Procedure Laterality Date    CARDIAC SURGERY       No Known Allergies    Current Outpatient Prescriptions:     acetaminophen (TYLENOL) 500 mg tablet, Take 500 mg by mouth every 6 (six) hours, Disp: , Rfl:     albuterol (2 5 mg/3 mL) 0 083 % nebulizer solution, Take 3 mL (2 5 mg total) by nebulization every 6 (six) hours, Disp: 100 vial, Rfl: 3    ALPRAZolam (XANAX) 0 25 mg tablet, take 1/2 to 1 tablet by mouth every 6 hours if needed, Disp: 60 tablet, Rfl: 3    amLODIPine (NORVASC) 10 mg tablet, Take 1 tablet (10 mg total) by mouth daily, Disp: 90 tablet, Rfl: 3    aspirin (ELISE LOW DOSE) 81 mg EC tablet, Take by mouth, Disp: , Rfl:     atorvastatin (LIPITOR) 40 mg tablet, Take 1 tablet by mouth daily, Disp: , Rfl:     Calcium Carbonate-Vitamin D 600-200 MG-UNIT CAPS, Take 1 tablet by mouth 2 (two) times a day, Disp: , Rfl:     candesartan-hydrochlorothiazide (ATACAND HCT) 32-12 5 MG per tablet, Take 1 tablet by mouth daily, Disp: , Rfl:     carvedilol (COREG) 25 mg tablet, Take 25 mg by mouth 2 (two) times a day with meals, Disp: , Rfl: 0    clopidogrel (PLAVIX) 75 mg tablet, , Disp: , Rfl: 0    fluticasone (FLONASE) 50 mcg/act nasal spray, 1 spray into each nostril daily, Disp: 16 g, Rfl: 3    furosemide (LASIX) 40 mg tablet, Take 1 tablet (40 mg total) by mouth 2 (two) times a day (Patient taking differently: Take 40 mg by mouth as needed  ), Disp: 180 tablet, Rfl: 3    levothyroxine 100 mcg tablet, Take 1 tablet (100 mcg total) by mouth daily, Disp: 90 tablet, Rfl: 3    meloxicam (MOBIC) 15 mg tablet, Take 1 tablet (15 mg total) by mouth daily for 30 days, Disp: 30 tablet, Rfl: 3    niacin (NIASPAN) 1000 MG CR tablet, Take 1,000 mg by mouth, Disp: , Rfl:     pantoprazole (PROTONIX) 40 mg tablet, Take 1 tablet (40 mg total) by mouth 2 (two) times a day, Disp: 180 tablet, Rfl: 3    tiotropium (SPIRIVA HANDIHALER) 18 mcg inhalation capsule, Place 1 capsule into inhaler and inhale daily, Disp: , Rfl:     brompheniramine-pseudoephedrine-DM 30-2-10 MG/5ML syrup, Take 5 mL by mouth 4 (four) times a day as needed for congestion or cough, Disp: 180 mL, Rfl: 0    cephalexin (KEFLEX) 500 mg capsule, Take 1 capsule (500 mg total) by mouth every 8 (eight) hours for 10 days, Disp: 30 capsule, Rfl: 0    predniSONE 10 mg tablet, 3 tabs po bid x2 days, then 2 tabs po bid x2 days, then 1 tab bid x2 days, then 1 daily until done , Disp: 26 tablet, Rfl: 0    Review of Systems    Constitutional:     Denies fever, chills, fatigue, weakness ,weight loss, weight gain     ENT: Denies earache, loss of hearing, nosebleed, nasal discharge,nasal congestion, sore throat,hoarseness  Pulmonary: Denies shortness of breath , dyspnea on exertion, orthopnea ,but complains of cough, wheezing and pnd    Cardiovascular:  Denies bradycardia , tachycardia ,palpations, lower extremity, edema leg, claudication  Breast:  Denies new or changing breast lumps,  nipple discharge, nipple changes,  Abdomen:  Denies abdominal pain , anorexia ,indigestion, nausea ,vomiting, constipation , diarrhea  Musculoskeletal: Denies myalgias, arthralgias, joint swelling, joint stiffness ,limb pain, limb swelling  Lymph: denies swollen glands  Gu: no dysuria or urinary frequency  Skin: Denies skin rash, skin lesion, skin wound, itching,dry skin  Neuro: Denies headache, numbness, tingling, confusion, loss of consciousness, dizziness ,vertigo  Psychiatric: Denies feelings of depression, suicidal ideation, anxiety, sleep disturbances    OBJECTIVE  /82   Pulse 64   Temp 98 6 °F (37 °C)   Ht 5' 4" (1 626 m)   Wt 78 kg (172 lb)   SpO2 95%   BMI 29 52 kg/m²   Constitutional:   NAD, well appearing and well nourished      ENT:   Conjunctiva and lids: no injection, edema, or discharge     Pupils and iris: MILADY bilaterally    External inspection of ears and nose: normal without deformities or discharge  Otoscopic exam: Canals patent without erythema; tm are dull and erythematous bilaterally       Nasal mucosa, septum and turbinates: Turbinae injection with discharge   Oropharynx:  Moist mucosa, normal tongue and tonsils without lesions  Erythema and injection  of post pharynx with pnd     Pulmonary:Respiratory effort normal rate and rhythm, no increased work of breathing   Auscultation of lungs:  Scattered rhonchi and expiratory wheezes bilaterally      Cardiovascular: regular rate and rhythm, S1 and S2, no murmur, no edema and/or varicosities of LE      Abdomen: Soft and non-distended    Positive bowel sounds    No heptomegaly or splenomegaly    Gu: no suprapubic tenderness, no CVA tenderness, or urethral discharge  Lymphatic: Anterior cervical lymphadenopathy     Muscskeletal:  Gait and station: Normal gait     Digits and nails normal without clubbing or cyanosis      Inspection/palpation of joints, bones, and muscles:  No joint tenderness, swelling, full active and passive range of motion  Skin: Normal skin turgor and no rashes      Neuro:    Normal reflexes       Psych: alert and oriented to person, place and time     normal mood and affect       Assessment/Plan:Diagnoses and all orders for this visit:    Acute bronchitis, unspecified organism  -     brompheniramine-pseudoephedrine-DM 30-2-10 MG/5ML syrup; Take 5 mL by mouth 4 (four) times a day as needed for congestion or cough  -     cephalexin (KEFLEX) 500 mg capsule; Take 1 capsule (500 mg total) by mouth every 8 (eight) hours for 10 days  -     predniSONE 10 mg tablet; 3 tabs po bid x2 days, then 2 tabs po bid x2 days, then 1 tab bid x2 days, then 1 daily until done  Reviewed with patient plan to treat with the above plan      Patient instructed to call in 72 hours if not feeling better or if symptoms worsen

## 2018-10-15 ENCOUNTER — TELEPHONE (OUTPATIENT)
Dept: FAMILY MEDICINE CLINIC | Facility: CLINIC | Age: 78
End: 2018-10-15

## 2018-10-15 NOTE — TELEPHONE ENCOUNTER
WAS SEEN FRI MORNING   FOR BAD COUGH     SHE IS FEELING A LITTLE BETTER   COUGH IS STILL THERE BUT NOT AS BAD

## 2018-10-17 NOTE — TELEPHONE ENCOUNTER
She still has cough with phlem, its not as bad but now she is wheezing,  Call rx or see her? Pl adv

## 2018-10-17 NOTE — TELEPHONE ENCOUNTER
Pt states coughing is getting better and she is still taking steriods   She does have loss of voice at times with some wheezing

## 2018-10-17 NOTE — TELEPHONE ENCOUNTER
If its getting better, tell her to continue with current therapy    Makes sure she is using her inhaler

## 2018-11-14 ENCOUNTER — TELEPHONE (OUTPATIENT)
Dept: FAMILY MEDICINE CLINIC | Facility: CLINIC | Age: 78
End: 2018-11-14

## 2018-11-14 NOTE — TELEPHONE ENCOUNTER
1) She finished the cough med you gave, she still has the cough, can you call in another rx for the cough med  2) she will be flying to [facility] and always has ear pn when flies, you said last time she was here you would call something in for her  [facility]  Will ck with pharm if no cb

## 2018-11-15 DIAGNOSIS — H69.80 DYSFUNCTION OF EUSTACHIAN TUBE, UNSPECIFIED LATERALITY: Primary | ICD-10-CM

## 2018-11-15 DIAGNOSIS — J20.9 ACUTE BRONCHITIS, UNSPECIFIED ORGANISM: ICD-10-CM

## 2018-11-15 RX ORDER — BROMPHENIRAMINE MALEATE, PSEUDOEPHEDRINE HYDROCHLORIDE, AND DEXTROMETHORPHAN HYDROBROMIDE 2; 30; 10 MG/5ML; MG/5ML; MG/5ML
5 SYRUP ORAL 4 TIMES DAILY PRN
Qty: 180 ML | Refills: 0 | Status: SHIPPED | OUTPATIENT
Start: 2018-11-15 | End: 2019-01-16 | Stop reason: ALTCHOICE

## 2018-11-15 RX ORDER — PREDNISONE 10 MG/1
TABLET ORAL
Qty: 26 TABLET | Refills: 0 | Status: SHIPPED | OUTPATIENT
Start: 2018-11-15 | End: 2019-01-16 | Stop reason: ALTCHOICE

## 2019-01-16 ENCOUNTER — OFFICE VISIT (OUTPATIENT)
Dept: FAMILY MEDICINE CLINIC | Facility: CLINIC | Age: 79
End: 2019-01-16
Payer: COMMERCIAL

## 2019-01-16 ENCOUNTER — APPOINTMENT (OUTPATIENT)
Dept: LAB | Facility: CLINIC | Age: 79
End: 2019-01-16
Payer: COMMERCIAL

## 2019-01-16 VITALS
HEIGHT: 64 IN | DIASTOLIC BLOOD PRESSURE: 82 MMHG | HEART RATE: 74 BPM | WEIGHT: 174.6 LBS | TEMPERATURE: 97 F | SYSTOLIC BLOOD PRESSURE: 132 MMHG | BODY MASS INDEX: 29.81 KG/M2 | OXYGEN SATURATION: 94 %

## 2019-01-16 DIAGNOSIS — E78.00 HYPERCHOLESTEROLEMIA: ICD-10-CM

## 2019-01-16 DIAGNOSIS — I10 ESSENTIAL HYPERTENSION: ICD-10-CM

## 2019-01-16 DIAGNOSIS — E03.9 HYPOTHYROIDISM, UNSPECIFIED TYPE: ICD-10-CM

## 2019-01-16 DIAGNOSIS — I10 ESSENTIAL HYPERTENSION: Primary | ICD-10-CM

## 2019-01-16 DIAGNOSIS — J44.9 CHRONIC OBSTRUCTIVE PULMONARY DISEASE, UNSPECIFIED COPD TYPE (HCC): ICD-10-CM

## 2019-01-16 LAB
ALBUMIN SERPL BCP-MCNC: 4.1 G/DL (ref 3.5–5)
ALP SERPL-CCNC: 59 U/L (ref 46–116)
ALT SERPL W P-5'-P-CCNC: 20 U/L (ref 12–78)
ANION GAP SERPL CALCULATED.3IONS-SCNC: 6 MMOL/L (ref 4–13)
AST SERPL W P-5'-P-CCNC: 15 U/L (ref 5–45)
BILIRUB SERPL-MCNC: 0.33 MG/DL (ref 0.2–1)
BUN SERPL-MCNC: 25 MG/DL (ref 5–25)
CALCIUM SERPL-MCNC: 9.3 MG/DL (ref 8.3–10.1)
CHLORIDE SERPL-SCNC: 101 MMOL/L (ref 100–108)
CHOLEST SERPL-MCNC: 184 MG/DL (ref 50–200)
CO2 SERPL-SCNC: 28 MMOL/L (ref 21–32)
CREAT SERPL-MCNC: 0.95 MG/DL (ref 0.6–1.3)
GFR SERPL CREATININE-BSD FRML MDRD: 58 ML/MIN/1.73SQ M
GLUCOSE P FAST SERPL-MCNC: 93 MG/DL (ref 65–99)
HDLC SERPL-MCNC: 61 MG/DL (ref 40–60)
LDLC SERPL CALC-MCNC: 78 MG/DL (ref 0–100)
POTASSIUM SERPL-SCNC: 3.8 MMOL/L (ref 3.5–5.3)
PROT SERPL-MCNC: 7.7 G/DL (ref 6.4–8.2)
SODIUM SERPL-SCNC: 135 MMOL/L (ref 136–145)
TRIGL SERPL-MCNC: 224 MG/DL
TSH SERPL DL<=0.05 MIU/L-ACNC: 3.47 UIU/ML (ref 0.36–3.74)

## 2019-01-16 PROCEDURE — 99214 OFFICE O/P EST MOD 30 MIN: CPT | Performed by: FAMILY MEDICINE

## 2019-01-16 PROCEDURE — 36415 COLL VENOUS BLD VENIPUNCTURE: CPT

## 2019-01-16 PROCEDURE — 3079F DIAST BP 80-89 MM HG: CPT | Performed by: FAMILY MEDICINE

## 2019-01-16 PROCEDURE — 3075F SYST BP GE 130 - 139MM HG: CPT | Performed by: FAMILY MEDICINE

## 2019-01-16 PROCEDURE — 80053 COMPREHEN METABOLIC PANEL: CPT

## 2019-01-16 PROCEDURE — 80061 LIPID PANEL: CPT

## 2019-01-16 PROCEDURE — 84443 ASSAY THYROID STIM HORMONE: CPT

## 2019-01-16 RX ORDER — BUDESONIDE AND FORMOTEROL FUMARATE DIHYDRATE 160; 4.5 UG/1; UG/1
2 AEROSOL RESPIRATORY (INHALATION) 2 TIMES DAILY
Qty: 6 G | Refills: 3
Start: 2019-01-16 | End: 2021-04-07 | Stop reason: SDUPTHER

## 2019-01-16 NOTE — PROGRESS NOTES
Patient ID: Krysten Babb is a 66 y o  female  HPI: 66 y  o female presents for follow up of hypertension, hypercholesterolemia, hypothyroidism and copd  She denies any new complaints at this time  SUBJECTIVE    Family History   Problem Relation Age of Onset    Hypertension Father         essential     Heart disease Father      Social History     Social History    Marital status:      Spouse name: N/A    Number of children: 4    Years of education: less than high school     Occupational History    Not on file  Social History Main Topics    Smoking status: Current Every Day Smoker    Smokeless tobacco: Never Used    Alcohol use No    Drug use: No    Sexual activity: Not on file     Other Topics Concern    Not on file     Social History Narrative    Daily coffee consumption:    Daily cola consumption:     Tea    Water intake, adequate (per day)     No past medical history on file    Past Surgical History:   Procedure Laterality Date    CARDIAC SURGERY       No Known Allergies    Current Outpatient Prescriptions:     acetaminophen (TYLENOL) 500 mg tablet, Take 500 mg by mouth every 6 (six) hours, Disp: , Rfl:     albuterol (2 5 mg/3 mL) 0 083 % nebulizer solution, Take 3 mL (2 5 mg total) by nebulization every 6 (six) hours, Disp: 100 vial, Rfl: 3    ALPRAZolam (XANAX) 0 25 mg tablet, take 1/2 to 1 tablet by mouth every 6 hours if needed, Disp: 60 tablet, Rfl: 3    amLODIPine (NORVASC) 10 mg tablet, Take 1 tablet (10 mg total) by mouth daily, Disp: 90 tablet, Rfl: 3    aspirin (ELISE LOW DOSE) 81 mg EC tablet, Take by mouth, Disp: , Rfl:     atorvastatin (LIPITOR) 40 mg tablet, Take 1 tablet by mouth daily, Disp: , Rfl:     Calcium Carbonate-Vitamin D 600-200 MG-UNIT CAPS, Take 1 tablet by mouth 2 (two) times a day, Disp: , Rfl:     candesartan-hydrochlorothiazide (ATACAND HCT) 32-12 5 MG per tablet, Take 1 tablet by mouth daily, Disp: , Rfl:     carvedilol (COREG) 25 mg tablet, Take 25 mg by mouth 2 (two) times a day with meals, Disp: , Rfl: 0    clopidogrel (PLAVIX) 75 mg tablet, , Disp: , Rfl: 0    fluticasone (FLONASE) 50 mcg/act nasal spray, 1 spray into each nostril daily, Disp: 16 g, Rfl: 3    furosemide (LASIX) 40 mg tablet, Take 1 tablet (40 mg total) by mouth 2 (two) times a day (Patient taking differently: Take 40 mg by mouth as needed  ), Disp: 180 tablet, Rfl: 3    levothyroxine 100 mcg tablet, Take 1 tablet (100 mcg total) by mouth daily, Disp: 90 tablet, Rfl: 3    meloxicam (MOBIC) 15 mg tablet, Take 1 tablet (15 mg total) by mouth daily for 30 days, Disp: 30 tablet, Rfl: 3    niacin (NIASPAN) 1000 MG CR tablet, Take 1,000 mg by mouth, Disp: , Rfl:     pantoprazole (PROTONIX) 40 mg tablet, Take 1 tablet (40 mg total) by mouth 2 (two) times a day, Disp: 180 tablet, Rfl: 3    tiotropium (SPIRIVA HANDIHALER) 18 mcg inhalation capsule, Place 1 capsule into inhaler and inhale daily, Disp: , Rfl:     budesonide-formoterol (SYMBICORT) 160-4 5 mcg/act inhaler, Inhale 2 puffs 2 (two) times a day Rinse mouth after use , Disp: 6 g, Rfl: 3    Review of Systems  Constitutional:     Denies fever, chills ,fatigue ,weakness ,weight loss, weight gain     ENT: Denies earache ,loss of hearing ,nosebleed, nasal discharge,nasal congestion ,sore throat ,hoarseness  Pulmonary: Denies shortness of breath ,cough  ,+ chronic dyspnea on exertion, denies any orthopnea  Or PND   Cardiovascular:  Denies bradycardia , tachycardia  ,palpations, lower extremity edema leg, claudication  Breast:  Denies new or changing breast lumps ,nipple discharge ,nipple changes  Abdomen:  Denies abdominal pain , anorexia , indigestion, nausea, vomiting, constipation, diarrhea  Musculoskeletal: Denies myalgias, arthralgias, joint swelling, joint stiffness , limb pain, limb swelling  Gu: denies dysuria, polyuria  Skin: Denies skin rash, skin lesion, skin wound, itching, dry skin  Neuro: Denies headache, numbness, tingling, confusion, loss of consciousness, dizziness, vertigo  Psychiatric: Denies feelings of depression, suicidal ideation, anxiety, sleep disturbances    OBJECTIVE  /82   Pulse 74   Temp (!) 97 °F (36 1 °C)   Ht 5' 4" (1 626 m)   Wt 79 2 kg (174 lb 9 6 oz)   SpO2 94%   BMI 29 97 kg/m²   Constitutional:   NAD, well appearing and well nourished      ENT:   Conjunctiva and lids: no injection, edema, or discharge     Pupils and iris: MILADY bilaterally    External inspection of ears and nose: normal without deformities or discharge  Otoscopic exam: Canals patent without erythema  Nasal mucosa, septum and turbinates: Normal or edema or discharge         Oropharynx:  Moist mucosa, normal tongue and tonsils without lesions  No erythema        Pulmonary:Respiratory effort normal rate and rhythm, no increased work of breathing  Auscultation of lungs:  Clear bilaterally with no adventitious breath sounds       Cardiovascular: regular rate and rhythm, S1 and S2, no murmur, no edema and/or varicosities of LE      Abdomen: Soft and non-distended     Positive bowel sounds      No heptomegaly or splenomegaly      Gu: no suprapubic tenderness or CVA tenderness, no urethral discharge  Lymphatic:  No anterior or posterior cervical lymphadenopathy         Musculoskeletal:  Gait and station: Normal gait      Digits and nails normal without clubbing or cyanosis       Inspection/palpation of joints, bones, and muscles:  No joint tenderness, swelling, full active and passive range of motion       Skin: Normal skin turgor and no rashes      Neuro:    Normal reflexes     Psych:   alert and oriented to person, place and time     normal mood and affect       Assessment/Plan:Diagnoses and all orders for this visit:    Essential hypertension  -     Comprehensive metabolic panel; Future    Hypercholesterolemia  -     Lipid Panel with Direct LDL reflex;  Future    Hypothyroidism, unspecified type  -     TSH, 3rd generation; Future    Chronic obstructive pulmonary disease, unspecified COPD type (HCC)  -     budesonide-formoterol (SYMBICORT) 160-4 5 mcg/act inhaler; Inhale 2 puffs 2 (two) times a day Rinse mouth after use  I will see patient back in 4 mos or sooner prn

## 2019-01-29 DIAGNOSIS — F41.9 ANXIETY: ICD-10-CM

## 2019-01-29 RX ORDER — ALPRAZOLAM 0.25 MG/1
TABLET ORAL
Qty: 90 TABLET | Refills: 0 | Status: SHIPPED | OUTPATIENT
Start: 2019-01-29 | End: 2019-03-19 | Stop reason: SDUPTHER

## 2019-03-19 ENCOUNTER — TELEPHONE (OUTPATIENT)
Dept: FAMILY MEDICINE CLINIC | Facility: CLINIC | Age: 79
End: 2019-03-19

## 2019-03-19 DIAGNOSIS — F41.9 ANXIETY: ICD-10-CM

## 2019-03-19 RX ORDER — ALPRAZOLAM 0.25 MG/1
TABLET ORAL
Qty: 90 TABLET | Refills: 0 | Status: SHIPPED | OUTPATIENT
Start: 2019-03-19 | End: 2019-05-06 | Stop reason: SDUPTHER

## 2019-04-10 DIAGNOSIS — N63.0 BREAST NODULE: Primary | ICD-10-CM

## 2019-04-17 ENCOUNTER — HOSPITAL ENCOUNTER (OUTPATIENT)
Dept: ULTRASOUND IMAGING | Facility: CLINIC | Age: 79
Discharge: HOME | End: 2019-04-17
Payer: COMMERCIAL

## 2019-04-17 ENCOUNTER — HOSPITAL ENCOUNTER (OUTPATIENT)
Dept: MAMMOGRAPHY | Facility: CLINIC | Age: 79
Discharge: HOME | End: 2019-04-17
Payer: COMMERCIAL

## 2019-04-17 VITALS — WEIGHT: 174 LBS | HEIGHT: 64 IN | BODY MASS INDEX: 29.71 KG/M2

## 2019-04-17 DIAGNOSIS — R92.2 INCONCLUSIVE MAMMOGRAM: ICD-10-CM

## 2019-04-17 DIAGNOSIS — N63.0 BREAST NODULE: ICD-10-CM

## 2019-04-17 PROCEDURE — 76642 ULTRASOUND BREAST LIMITED: CPT

## 2019-04-17 PROCEDURE — 77066 DX MAMMO INCL CAD BI: CPT

## 2019-04-17 PROCEDURE — G0279 TOMOSYNTHESIS, MAMMO: HCPCS

## 2019-04-24 ENCOUNTER — HOSPITAL ENCOUNTER (OUTPATIENT)
Dept: MAMMOGRAPHY | Facility: CLINIC | Age: 79
Discharge: HOME | End: 2019-04-24

## 2019-04-24 ENCOUNTER — HOSPITAL ENCOUNTER (OUTPATIENT)
Dept: ULTRASOUND IMAGING | Facility: CLINIC | Age: 79
Discharge: HOME | End: 2019-04-24
Payer: COMMERCIAL

## 2019-04-24 VITALS
SYSTOLIC BLOOD PRESSURE: 134 MMHG | HEIGHT: 64 IN | BODY MASS INDEX: 29.71 KG/M2 | HEART RATE: 62 BPM | WEIGHT: 174 LBS | DIASTOLIC BLOOD PRESSURE: 80 MMHG

## 2019-04-24 DIAGNOSIS — R92.8 ABNORMAL ULTRASOUND OF BREAST: ICD-10-CM

## 2019-04-24 DIAGNOSIS — R92.8 ABNORMAL MAMMOGRAM: ICD-10-CM

## 2019-04-24 PROCEDURE — 88305 TISSUE EXAM BY PATHOLOGIST: CPT | Performed by: PATHOLOGY

## 2019-04-24 PROCEDURE — 88341 IMHCHEM/IMCYTCHM EA ADD ANTB: CPT | Performed by: PATHOLOGY

## 2019-04-24 PROCEDURE — 88342 IMHCHEM/IMCYTCHM 1ST ANTB: CPT | Performed by: PATHOLOGY

## 2019-04-24 PROCEDURE — 19083 BX BREAST 1ST LESION US IMAG: CPT

## 2019-04-24 RX ORDER — LIDOCAINE HYDROCHLORIDE 10 MG/ML
4 INJECTION, SOLUTION INFILTRATION; PERINEURAL ONCE
Status: COMPLETED | OUTPATIENT
Start: 2019-04-24 | End: 2019-04-24

## 2019-04-24 RX ADMIN — LIDOCAINE HYDROCHLORIDE 4 ML: 10 INJECTION, SOLUTION INFILTRATION; PERINEURAL at 10:27

## 2019-04-26 ENCOUNTER — TELEPHONE (OUTPATIENT)
Dept: MAMMOGRAPHY | Facility: CLINIC | Age: 79
End: 2019-04-26

## 2019-05-06 ENCOUNTER — TELEPHONE (OUTPATIENT)
Dept: FAMILY MEDICINE CLINIC | Facility: CLINIC | Age: 79
End: 2019-05-06

## 2019-05-06 DIAGNOSIS — F41.9 ANXIETY: ICD-10-CM

## 2019-05-06 DIAGNOSIS — E03.9 HYPOTHYROIDISM, UNSPECIFIED TYPE: ICD-10-CM

## 2019-05-06 RX ORDER — ALPRAZOLAM 0.25 MG/1
TABLET ORAL
Qty: 90 TABLET | Refills: 0 | Status: SHIPPED | OUTPATIENT
Start: 2019-05-06 | End: 2019-06-03 | Stop reason: SDUPTHER

## 2019-05-06 RX ORDER — LEVOTHYROXINE SODIUM 0.1 MG/1
TABLET ORAL
Qty: 90 TABLET | Refills: 3 | Status: SHIPPED | OUTPATIENT
Start: 2019-05-06 | End: 2020-07-14 | Stop reason: ALTCHOICE

## 2019-05-22 ENCOUNTER — OFFICE VISIT (OUTPATIENT)
Dept: FAMILY MEDICINE CLINIC | Facility: CLINIC | Age: 79
End: 2019-05-22
Payer: COMMERCIAL

## 2019-05-22 ENCOUNTER — APPOINTMENT (OUTPATIENT)
Dept: LAB | Facility: CLINIC | Age: 79
End: 2019-05-22
Payer: COMMERCIAL

## 2019-05-22 VITALS
BODY MASS INDEX: 31.24 KG/M2 | DIASTOLIC BLOOD PRESSURE: 80 MMHG | WEIGHT: 183 LBS | SYSTOLIC BLOOD PRESSURE: 122 MMHG | HEART RATE: 60 BPM | HEIGHT: 64 IN | TEMPERATURE: 98.2 F

## 2019-05-22 DIAGNOSIS — E03.9 HYPOTHYROIDISM, UNSPECIFIED TYPE: ICD-10-CM

## 2019-05-22 DIAGNOSIS — I10 ESSENTIAL HYPERTENSION: ICD-10-CM

## 2019-05-22 DIAGNOSIS — H69.83 EUSTACHIAN TUBE DYSFUNCTION, BILATERAL: Primary | ICD-10-CM

## 2019-05-22 DIAGNOSIS — E55.9 VITAMIN D DEFICIENCY: ICD-10-CM

## 2019-05-22 DIAGNOSIS — E78.00 HYPERCHOLESTEROLEMIA: ICD-10-CM

## 2019-05-22 LAB
25(OH)D3 SERPL-MCNC: 19.1 NG/ML (ref 30–100)
ALBUMIN SERPL BCP-MCNC: 4 G/DL (ref 3.5–5)
ALP SERPL-CCNC: 70 U/L (ref 46–116)
ALT SERPL W P-5'-P-CCNC: 28 U/L (ref 12–78)
ANION GAP SERPL CALCULATED.3IONS-SCNC: 5 MMOL/L (ref 4–13)
AST SERPL W P-5'-P-CCNC: 32 U/L (ref 5–45)
BILIRUB SERPL-MCNC: 0.4 MG/DL (ref 0.2–1)
BUN SERPL-MCNC: 26 MG/DL (ref 5–25)
CALCIUM SERPL-MCNC: 9.1 MG/DL (ref 8.3–10.1)
CHLORIDE SERPL-SCNC: 105 MMOL/L (ref 100–108)
CHOLEST SERPL-MCNC: 206 MG/DL (ref 50–200)
CO2 SERPL-SCNC: 29 MMOL/L (ref 21–32)
CREAT SERPL-MCNC: 1.03 MG/DL (ref 0.6–1.3)
GFR SERPL CREATININE-BSD FRML MDRD: 52 ML/MIN/1.73SQ M
GLUCOSE P FAST SERPL-MCNC: 103 MG/DL (ref 65–99)
HDLC SERPL-MCNC: 67 MG/DL (ref 40–60)
LDLC SERPL CALC-MCNC: 89 MG/DL (ref 0–100)
POTASSIUM SERPL-SCNC: 4.3 MMOL/L (ref 3.5–5.3)
PROT SERPL-MCNC: 7.7 G/DL (ref 6.4–8.2)
SODIUM SERPL-SCNC: 139 MMOL/L (ref 136–145)
TRIGL SERPL-MCNC: 252 MG/DL

## 2019-05-22 PROCEDURE — 1036F TOBACCO NON-USER: CPT | Performed by: FAMILY MEDICINE

## 2019-05-22 PROCEDURE — 36415 COLL VENOUS BLD VENIPUNCTURE: CPT

## 2019-05-22 PROCEDURE — 3074F SYST BP LT 130 MM HG: CPT | Performed by: FAMILY MEDICINE

## 2019-05-22 PROCEDURE — 1101F PT FALLS ASSESS-DOCD LE1/YR: CPT | Performed by: FAMILY MEDICINE

## 2019-05-22 PROCEDURE — 99214 OFFICE O/P EST MOD 30 MIN: CPT | Performed by: FAMILY MEDICINE

## 2019-05-22 PROCEDURE — 3079F DIAST BP 80-89 MM HG: CPT | Performed by: FAMILY MEDICINE

## 2019-05-22 PROCEDURE — 82306 VITAMIN D 25 HYDROXY: CPT

## 2019-05-22 PROCEDURE — 80053 COMPREHEN METABOLIC PANEL: CPT

## 2019-05-22 PROCEDURE — 80061 LIPID PANEL: CPT

## 2019-05-22 RX ORDER — PREDNISONE 10 MG/1
TABLET ORAL
Qty: 26 TABLET | Refills: 0 | Status: SHIPPED | OUTPATIENT
Start: 2019-05-22 | End: 2019-09-25 | Stop reason: ALTCHOICE

## 2019-05-23 DIAGNOSIS — R73.9 HYPERGLYCEMIA: Primary | ICD-10-CM

## 2019-05-29 ENCOUNTER — CLINICAL SUPPORT (OUTPATIENT)
Dept: FAMILY MEDICINE CLINIC | Facility: CLINIC | Age: 79
End: 2019-05-29
Payer: COMMERCIAL

## 2019-05-29 DIAGNOSIS — R73.9 HYPERGLYCEMIA: Primary | ICD-10-CM

## 2019-05-29 LAB — SL AMB POCT HEMOGLOBIN AIC: 6.4 (ref ?–6.5)

## 2019-05-29 PROCEDURE — 83036 HEMOGLOBIN GLYCOSYLATED A1C: CPT | Performed by: FAMILY MEDICINE

## 2019-06-03 ENCOUNTER — TELEPHONE (OUTPATIENT)
Dept: FAMILY MEDICINE CLINIC | Facility: CLINIC | Age: 79
End: 2019-06-03

## 2019-06-03 DIAGNOSIS — F41.9 ANXIETY: ICD-10-CM

## 2019-06-03 DIAGNOSIS — J30.89 ALLERGIC RHINITIS DUE TO OTHER ALLERGIC TRIGGER, UNSPECIFIED SEASONALITY: ICD-10-CM

## 2019-06-03 DIAGNOSIS — M54.50 LUMBAR BACK PAIN: ICD-10-CM

## 2019-06-03 RX ORDER — MELOXICAM 15 MG/1
15 TABLET ORAL DAILY
Qty: 30 TABLET | Refills: 3 | Status: SHIPPED | OUTPATIENT
Start: 2019-06-03 | End: 2020-01-07 | Stop reason: SDUPTHER

## 2019-06-03 RX ORDER — FLUTICASONE PROPIONATE 50 MCG
1 SPRAY, SUSPENSION (ML) NASAL DAILY
Qty: 16 G | Refills: 3 | Status: SHIPPED | OUTPATIENT
Start: 2019-06-03 | End: 2021-06-14 | Stop reason: SDUPTHER

## 2019-06-03 RX ORDER — ALPRAZOLAM 0.25 MG/1
TABLET ORAL
Qty: 90 TABLET | Refills: 0 | Status: SHIPPED | OUTPATIENT
Start: 2019-06-03 | End: 2019-07-02 | Stop reason: SDUPTHER

## 2019-06-27 ENCOUNTER — TELEPHONE (OUTPATIENT)
Dept: FAMILY MEDICINE CLINIC | Facility: CLINIC | Age: 79
End: 2019-06-27

## 2019-07-01 ENCOUNTER — TELEPHONE (OUTPATIENT)
Dept: FAMILY MEDICINE CLINIC | Facility: CLINIC | Age: 79
End: 2019-07-01

## 2019-07-01 DIAGNOSIS — R60.9 EDEMA, UNSPECIFIED TYPE: Primary | ICD-10-CM

## 2019-07-01 NOTE — TELEPHONE ENCOUNTER
The nurse practioner from [address] [place] [address]   She was visiting the Pt today and her BP was 80/40 after drinking fluids ,her BP was checked 92/48 , her water pill is 40 mg and was told to take a second dose if she has edema ,she is not symptomatic ,breating is fine ,and her Rt leg has a SM amt of non pitting edema ,the nurse told her not to take the second water pill ,monitor her wt  and only take a second water pill if she should have a 2 lb wt gain and is to continue monitoring her BP ,is there any else you would like the Pt to do

## 2019-07-02 ENCOUNTER — TELEPHONE (OUTPATIENT)
Dept: FAMILY MEDICINE CLINIC | Facility: CLINIC | Age: 79
End: 2019-07-02

## 2019-07-02 DIAGNOSIS — F41.9 ANXIETY: ICD-10-CM

## 2019-07-02 RX ORDER — ALPRAZOLAM 0.25 MG/1
TABLET ORAL
Qty: 180 TABLET | Refills: 0 | Status: SHIPPED | OUTPATIENT
Start: 2019-07-02 | End: 2019-12-09 | Stop reason: SDUPTHER

## 2019-07-02 NOTE — TELEPHONE ENCOUNTER
She needs a new rx for xanax with different directions   Last time she was here you told her she could take 2 pills at bed time to help her sleep     She doesn't take 2 every night but she always takes at least one       [facility] to [facility]

## 2019-07-11 ENCOUNTER — APPOINTMENT (OUTPATIENT)
Dept: LAB | Facility: CLINIC | Age: 79
End: 2019-07-11
Payer: COMMERCIAL

## 2019-07-11 DIAGNOSIS — R60.9 EDEMA, UNSPECIFIED TYPE: ICD-10-CM

## 2019-07-11 DIAGNOSIS — R73.9 HYPERGLYCEMIA: ICD-10-CM

## 2019-07-11 LAB
ANION GAP SERPL CALCULATED.3IONS-SCNC: 6 MMOL/L (ref 4–13)
BUN SERPL-MCNC: 18 MG/DL (ref 5–25)
CALCIUM SERPL-MCNC: 9.2 MG/DL (ref 8.3–10.1)
CHLORIDE SERPL-SCNC: 105 MMOL/L (ref 100–108)
CO2 SERPL-SCNC: 28 MMOL/L (ref 21–32)
CREAT SERPL-MCNC: 0.96 MG/DL (ref 0.6–1.3)
EST. AVERAGE GLUCOSE BLD GHB EST-MCNC: 140 MG/DL
GFR SERPL CREATININE-BSD FRML MDRD: 57 ML/MIN/1.73SQ M
GLUCOSE P FAST SERPL-MCNC: 100 MG/DL (ref 65–99)
HBA1C MFR BLD: 6.5 % (ref 4.2–6.3)
NT-PROBNP SERPL-MCNC: 588 PG/ML
POTASSIUM SERPL-SCNC: 4.1 MMOL/L (ref 3.5–5.3)
SODIUM SERPL-SCNC: 139 MMOL/L (ref 136–145)

## 2019-07-11 PROCEDURE — 83036 HEMOGLOBIN GLYCOSYLATED A1C: CPT

## 2019-07-11 PROCEDURE — 36415 COLL VENOUS BLD VENIPUNCTURE: CPT

## 2019-07-11 PROCEDURE — 80048 BASIC METABOLIC PNL TOTAL CA: CPT

## 2019-07-11 PROCEDURE — 83880 ASSAY OF NATRIURETIC PEPTIDE: CPT

## 2019-08-06 DIAGNOSIS — K21.9 GASTROESOPHAGEAL REFLUX DISEASE WITHOUT ESOPHAGITIS: ICD-10-CM

## 2019-08-06 RX ORDER — PANTOPRAZOLE SODIUM 40 MG/1
40 TABLET, DELAYED RELEASE ORAL 2 TIMES DAILY
Qty: 180 TABLET | Refills: 3 | Status: SHIPPED | OUTPATIENT
Start: 2019-08-06 | End: 2020-09-24

## 2019-09-25 ENCOUNTER — OFFICE VISIT (OUTPATIENT)
Dept: FAMILY MEDICINE CLINIC | Facility: CLINIC | Age: 79
End: 2019-09-25
Payer: COMMERCIAL

## 2019-09-25 VITALS
BODY MASS INDEX: 34.31 KG/M2 | SYSTOLIC BLOOD PRESSURE: 118 MMHG | HEART RATE: 72 BPM | DIASTOLIC BLOOD PRESSURE: 78 MMHG | WEIGHT: 201 LBS | TEMPERATURE: 99 F | HEIGHT: 64 IN

## 2019-09-25 DIAGNOSIS — E13.9 DIABETES 1.5, MANAGED AS TYPE 2 (HCC): Primary | ICD-10-CM

## 2019-09-25 DIAGNOSIS — Z23 NEED FOR VACCINATION: ICD-10-CM

## 2019-09-25 DIAGNOSIS — M54.16 LUMBAR RADICULOPATHY: ICD-10-CM

## 2019-09-25 DIAGNOSIS — E78.00 HYPERCHOLESTEROLEMIA: ICD-10-CM

## 2019-09-25 DIAGNOSIS — G47.00 INSOMNIA, UNSPECIFIED TYPE: ICD-10-CM

## 2019-09-25 DIAGNOSIS — I10 ESSENTIAL HYPERTENSION: ICD-10-CM

## 2019-09-25 DIAGNOSIS — Z12.11 COLON CANCER SCREENING: ICD-10-CM

## 2019-09-25 DIAGNOSIS — E03.9 HYPOTHYROIDISM, UNSPECIFIED TYPE: ICD-10-CM

## 2019-09-25 PROCEDURE — 99214 OFFICE O/P EST MOD 30 MIN: CPT | Performed by: FAMILY MEDICINE

## 2019-09-25 PROCEDURE — 90662 IIV NO PRSV INCREASED AG IM: CPT | Performed by: FAMILY MEDICINE

## 2019-09-25 PROCEDURE — 3078F DIAST BP <80 MM HG: CPT | Performed by: FAMILY MEDICINE

## 2019-09-25 PROCEDURE — G0008 ADMIN INFLUENZA VIRUS VAC: HCPCS | Performed by: FAMILY MEDICINE

## 2019-09-25 PROCEDURE — 3074F SYST BP LT 130 MM HG: CPT | Performed by: FAMILY MEDICINE

## 2019-09-25 RX ORDER — AMLODIPINE BESYLATE 5 MG/1
5 TABLET ORAL DAILY
Qty: 90 TABLET | Refills: 3 | Status: SHIPPED | OUTPATIENT
Start: 2019-09-25 | End: 2020-07-20

## 2019-09-25 RX ORDER — ZOLPIDEM TARTRATE 6.25 MG/1
6.25 TABLET, FILM COATED, EXTENDED RELEASE ORAL
Qty: 30 TABLET | Refills: 0 | Status: SHIPPED | OUTPATIENT
Start: 2019-09-25 | End: 2019-10-03 | Stop reason: ALTCHOICE

## 2019-09-25 RX ORDER — HYDROCODONE BITARTRATE AND ACETAMINOPHEN 5; 325 MG/1; MG/1
1 TABLET ORAL EVERY 6 HOURS PRN
Qty: 30 TABLET | Refills: 0 | Status: SHIPPED | OUTPATIENT
Start: 2019-09-25 | End: 2019-11-04 | Stop reason: SDUPTHER

## 2019-09-25 NOTE — PROGRESS NOTES
Patient ID: Kathrin Navas is a 78 y o  female  HPI: 78 y  o female presents for follow up of niddm, hypertension, hypercholesterolemia, and hypothyroidism  She is having trouble staying asleep at night and only averaging 3 hrs of sleep per night  She also complains of lumbar back pain with radicular pain down right leg but no paresthesia of leg or weakness of leg  SUBJECTIVE    Family History   Problem Relation Age of Onset    Hypertension Father         essential     Heart disease Father      Social History     Socioeconomic History    Marital status:       Spouse name: Not on file    Number of children: 3    Years of education: less than high school    Highest education level: Not on file   Occupational History    Not on file   Social Needs    Financial resource strain: Not on file    Food insecurity:     Worry: Not on file     Inability: Not on file    Transportation needs:     Medical: Not on file     Non-medical: Not on file   Tobacco Use    Smoking status: Former Smoker     Last attempt to quit: 3/1/2019     Years since quittin 5    Smokeless tobacco: Never Used   Substance and Sexual Activity    Alcohol use: No    Drug use: Never    Sexual activity: Not on file   Lifestyle    Physical activity:     Days per week: Not on file     Minutes per session: Not on file    Stress: Not on file   Relationships    Social connections:     Talks on phone: Not on file     Gets together: Not on file     Attends Gnosticism service: Not on file     Active member of club or organization: Not on file     Attends meetings of clubs or organizations: Not on file     Relationship status: Not on file    Intimate partner violence:     Fear of current or ex partner: Not on file     Emotionally abused: Not on file     Physically abused: Not on file     Forced sexual activity: Not on file   Other Topics Concern    Not on file   Social History Narrative    Daily coffee consumption:    Daily cola consumption:     Tea    Water intake, adequate (per day)     No past medical history on file    Past Surgical History:   Procedure Laterality Date    CARDIAC SURGERY      HYSTERECTOMY      age 32    OOPHORECTOMY Right     age 30    US GUIDED BREAST BIOPSY RIGHT COMPLETE Right 4/24/2019     No Known Allergies    Current Outpatient Medications:     acetaminophen (TYLENOL) 500 mg tablet, Take 500 mg by mouth every 6 (six) hours, Disp: , Rfl:     albuterol (2 5 mg/3 mL) 0 083 % nebulizer solution, Take 3 mL (2 5 mg total) by nebulization every 6 (six) hours, Disp: 100 vial, Rfl: 3    ALPRAZolam (XANAX) 0 25 mg tablet, 1bid prn anxiety, Disp: 180 tablet, Rfl: 0    amLODIPine (NORVASC) 5 mg tablet, Take 1 tablet (5 mg total) by mouth daily, Disp: 90 tablet, Rfl: 3    aspirin (ELISE LOW DOSE) 81 mg EC tablet, Take by mouth, Disp: , Rfl:     atorvastatin (LIPITOR) 40 mg tablet, Take 1 tablet by mouth daily, Disp: , Rfl:     budesonide-formoterol (SYMBICORT) 160-4 5 mcg/act inhaler, Inhale 2 puffs 2 (two) times a day Rinse mouth after use , Disp: 6 g, Rfl: 3    Calcium Carbonate-Vitamin D 600-200 MG-UNIT CAPS, Take 1 tablet by mouth 2 (two) times a day, Disp: , Rfl:     candesartan-hydrochlorothiazide (ATACAND HCT) 32-12 5 MG per tablet, Take 1 tablet by mouth daily, Disp: , Rfl:     carvedilol (COREG) 25 mg tablet, Take 25 mg by mouth 2 (two) times a day with meals, Disp: , Rfl: 0    clopidogrel (PLAVIX) 75 mg tablet, , Disp: , Rfl: 0    fluticasone (FLONASE) 50 mcg/act nasal spray, 1 spray into each nostril daily, Disp: 16 g, Rfl: 3    furosemide (LASIX) 40 mg tablet, Take 1 tablet (40 mg total) by mouth 2 (two) times a day (Patient taking differently: Take 40 mg by mouth as needed  ), Disp: 180 tablet, Rfl: 3    levothyroxine 100 mcg tablet, take 1 tablet by mouth once daily, Disp: 90 tablet, Rfl: 3    meloxicam (MOBIC) 15 mg tablet, Take 1 tablet (15 mg total) by mouth daily, Disp: 30 tablet, Rfl: 3   niacin (NIASPAN) 1000 MG CR tablet, Take 1,000 mg by mouth, Disp: , Rfl:     pantoprazole (PROTONIX) 40 mg tablet, Take 1 tablet (40 mg total) by mouth 2 (two) times a day, Disp: 180 tablet, Rfl: 3    tiotropium (SPIRIVA HANDIHALER) 18 mcg inhalation capsule, Place 1 capsule into inhaler and inhale daily, Disp: , Rfl:     HYDROcodone-acetaminophen (NORCO) 5-325 mg per tablet, Take 1 tablet by mouth every 6 (six) hours as needed for painMax Daily Amount: 4 tablets, Disp: 30 tablet, Rfl: 0    zolpidem (AMBIEN CR) 6 25 MG CR tablet, Take 1 tablet (6 25 mg total) by mouth daily at bedtime as needed for sleep, Disp: 30 tablet, Rfl: 0    Review of Systems  Constitutional:     Denies fever, chills ,fatigue ,weakness ,weight loss, weight gain     ENT: Denies earache ,loss of hearing ,nosebleed, nasal discharge,nasal congestion ,sore throat ,hoarseness  Pulmonary: Denies shortness of breath ,cough  ,dyspnea on exertion, orthopnea  ,PND   Cardiovascular:  Denies bradycardia , tachycardia  ,palpations, lower extremity edema leg, claudication  Breast:  Denies new or changing breast lumps ,nipple discharge ,nipple changes  Abdomen:  Denies abdominal pain , anorexia , indigestion, nausea, vomiting, constipation, diarrhea  Musculoskeletal: Denies myalgias,  joint swelling, joint stiffness ,  limb swelling+ lumbar back pain with radiation down right leg  Gu: denies dysuria, polyuria  Skin: Denies skin rash, skin lesion, skin wound, itching, dry skin  Neuro: Denies headache, numbness, tingling, confusion, loss of consciousness, dizziness, vertigo  Psychiatric: Denies feelings of depression, suicidal ideation, anxiety,+ sleep disturbances    OBJECTIVE  /78   Pulse 72   Temp 99 °F (37 2 °C)   Ht 5' 4" (1 626 m)   Wt 91 2 kg (201 lb)   BMI 34 50 kg/m²   Constitutional:   NAD, well appearing and well nourished      ENT:   Conjunctiva and lids: no injection, edema, or discharge     Pupils and iris: MILADY bilaterally External inspection of ears and nose: normal without deformities or discharge  Otoscopic exam: Canals patent without erythema  Nasal mucosa, septum and turbinates: Normal or edema or discharge         Oropharynx:  Moist mucosa, normal tongue and tonsils without lesions  No erythema        Pulmonary:Respiratory effort normal rate and rhythm, no increased work of breathing  Auscultation of lungs:  Clear bilaterally with no adventitious breath sounds       Cardiovascular: regular rate and rhythm, S1 and S2, no murmur, no edema and/or varicosities of LE      Abdomen: Soft and non-distended     Positive bowel sounds      No heptomegaly or splenomegaly      Gu: no suprapubic tenderness or CVA tenderness, no urethral discharge  Lymphatic:  No anterior or posterior cervical lymphadenopathy         Musculoskeletal:  Gait and station: Normal gait      Digits and nails normal without clubbing or cyanosis       Inspection/palpation of joints, bones, and muscles:  + SLR on right  At 30 degrees   Skin: Normal skin turgor and no rashes      Neuro:    Normal  CN 2-12     Normal reflexes     Normal sensation    Psych:   alert and oriented to person, place and time     normal mood and affect   Patient's shoes and socks removed  Right Foot/Ankle   Right Foot Inspection  Skin Exam: skin normal and skin intact no dry skin, no warmth, no callus, no erythema, no maceration, no abnormal color, no pre-ulcer, no ulcer and no callus                          Toe Exam: ROM and strength within normal limitsno swelling, no tenderness, erythema and  no right toe deformity  Sensory   Vibration: intact  Proprioception: intact   Monofilament testing: intact  Vascular  Capillary refills: < 3 seconds  The right DP pulse is 2+  The right PT pulse is 2+       Left Foot/Ankle  Left Foot Inspection  Skin Exam: skin normal and skin intactno dry skin, no warmth, no erythema, no maceration, normal color, no pre-ulcer, no ulcer and no callus Toe Exam: ROM and strength within normal limitsno swelling, no tenderness, no erythema and no left toe deformity                   Sensory   Vibration: intact  Proprioception: intact  Monofilament: intact  Vascular  Capillary refills: < 3 seconds  The left DP pulse is 2+  The left PT pulse is 2+  Assign Risk Category:  No deformity present; No loss of protective sensation; No weak pulses       Risk: 0      Assessment/Plan:Diagnoses and all orders for this visit:    Diabetes 1 5, managed as type 2 ([facility] 75 )  -     HEMOGLOBIN A1C W/ EAG ESTIMATION; Future    Insomnia, unspecified type  -     zolpidem (AMBIEN CR) 6 25 MG CR tablet; Take 1 tablet (6 25 mg total) by mouth daily at bedtime as needed for sleep    Essential hypertension  -     amLODIPine (NORVASC) 5 mg tablet; Take 1 tablet (5 mg total) by mouth daily  -     Comprehensive metabolic panel; Future    Lumbar radiculopathy  -     HYDROcodone-acetaminophen (NORCO) 5-325 mg per tablet; Take 1 tablet by mouth every 6 (six) hours as needed for painMax Daily Amount: 4 tablets    Hypercholesterolemia    Hypothyroidism, unspecified type  -     Lipid Panel with Direct LDL reflex; Future  -     TSH, 3rd generation; Future    Need for vaccination  -     influenza vaccine, 4889-7130, high-dose, PF 0 5 mL (FLUZONE HIGH-DOSE)    Colon cancer screening  -     Cologuard; Future        Reviewed with patient plan to treat with above plan      Patient instructed to call in 72 hours if not feeling better or if symptoms worsen

## 2019-10-01 ENCOUNTER — TELEPHONE (OUTPATIENT)
Dept: FAMILY MEDICINE CLINIC | Facility: CLINIC | Age: 79
End: 2019-10-01

## 2019-10-01 NOTE — TELEPHONE ENCOUNTER
Zolpidem 6 25 one qhs is not helping, she even took two last night and she only slept 2 hrs straight then she was awake then asleep and awake all night  Plus it cost her $51 32 out of pocket and that is too expensive for her  Pl adv

## 2019-10-03 DIAGNOSIS — G47.00 INSOMNIA, UNSPECIFIED TYPE: Primary | ICD-10-CM

## 2019-10-03 RX ORDER — TEMAZEPAM 15 MG/1
15 CAPSULE ORAL
Qty: 30 CAPSULE | Refills: 0 | Status: SHIPPED | OUTPATIENT
Start: 2019-10-03 | End: 2019-11-04 | Stop reason: SDUPTHER

## 2019-10-03 NOTE — TELEPHONE ENCOUNTER
Patient called insurance and insurance is asking if  there a generic brand for the Ambien? I told patient she was on the generic brand for Ambien  She said the insurance said there is another generic brand or any other generic brand that she could use?

## 2019-10-22 ENCOUNTER — TELEPHONE (OUTPATIENT)
Dept: FAMILY MEDICINE CLINIC | Facility: CLINIC | Age: 79
End: 2019-10-22

## 2019-10-22 NOTE — TELEPHONE ENCOUNTER
Symbicort inhaler  samples request 160/4 5 , please call patient, if there are no samples, we are suppose to ask Dr Vinita Essex per patient, left message on script line

## 2019-11-04 ENCOUNTER — TELEPHONE (OUTPATIENT)
Dept: FAMILY MEDICINE CLINIC | Facility: CLINIC | Age: 79
End: 2019-11-04

## 2019-11-04 DIAGNOSIS — M54.16 LUMBAR RADICULOPATHY: ICD-10-CM

## 2019-11-04 DIAGNOSIS — J45.909 UNCOMPLICATED ASTHMA, UNSPECIFIED ASTHMA SEVERITY, UNSPECIFIED WHETHER PERSISTENT: ICD-10-CM

## 2019-11-04 DIAGNOSIS — G47.00 INSOMNIA, UNSPECIFIED TYPE: ICD-10-CM

## 2019-11-04 RX ORDER — ALBUTEROL SULFATE 2.5 MG/3ML
SOLUTION RESPIRATORY (INHALATION)
Qty: 300 ML | Refills: 3 | Status: SHIPPED | OUTPATIENT
Start: 2019-11-04

## 2019-11-04 RX ORDER — HYDROCODONE BITARTRATE AND ACETAMINOPHEN 5; 325 MG/1; MG/1
1 TABLET ORAL EVERY 6 HOURS PRN
Qty: 30 TABLET | Refills: 0 | Status: SHIPPED | OUTPATIENT
Start: 2019-11-04 | End: 2019-12-09 | Stop reason: SDUPTHER

## 2019-11-04 RX ORDER — TEMAZEPAM 30 MG/1
30 CAPSULE ORAL
Qty: 30 CAPSULE | Refills: 3 | Status: SHIPPED | OUTPATIENT
Start: 2019-11-04 | End: 2020-01-07 | Stop reason: SDUPTHER

## 2019-11-04 NOTE — TELEPHONE ENCOUNTER
1) She is on Temazepam 15 mg 1 qhs, she is still not sleeping at night, can this be increased? She will need refill  Pl adv    2) Needs refill on Hydrocodone    [facility]

## 2019-11-04 NOTE — TELEPHONE ENCOUNTER
I can increase her to 30 mg; I'll send it in    She cannot take hydrocodone at bedtime along with temazepam

## 2019-12-06 ENCOUNTER — APPOINTMENT (OUTPATIENT)
Dept: LAB | Facility: CLINIC | Age: 79
End: 2019-12-06
Payer: COMMERCIAL

## 2019-12-06 DIAGNOSIS — E13.9 DIABETES 1.5, MANAGED AS TYPE 2 (HCC): ICD-10-CM

## 2019-12-06 DIAGNOSIS — E03.9 HYPOTHYROIDISM, UNSPECIFIED TYPE: ICD-10-CM

## 2019-12-06 DIAGNOSIS — I10 ESSENTIAL HYPERTENSION: ICD-10-CM

## 2019-12-06 LAB
ALBUMIN SERPL BCP-MCNC: 4.3 G/DL (ref 3.5–5)
ALP SERPL-CCNC: 64 U/L (ref 46–116)
ALT SERPL W P-5'-P-CCNC: 27 U/L (ref 12–78)
ANION GAP SERPL CALCULATED.3IONS-SCNC: 5 MMOL/L (ref 4–13)
AST SERPL W P-5'-P-CCNC: 20 U/L (ref 5–45)
BILIRUB SERPL-MCNC: 0.48 MG/DL (ref 0.2–1)
BUN SERPL-MCNC: 35 MG/DL (ref 5–25)
CALCIUM SERPL-MCNC: 9.5 MG/DL (ref 8.3–10.1)
CHLORIDE SERPL-SCNC: 103 MMOL/L (ref 100–108)
CHOLEST SERPL-MCNC: 235 MG/DL (ref 50–200)
CO2 SERPL-SCNC: 31 MMOL/L (ref 21–32)
CREAT SERPL-MCNC: 1.42 MG/DL (ref 0.6–1.3)
EST. AVERAGE GLUCOSE BLD GHB EST-MCNC: 143 MG/DL
GFR SERPL CREATININE-BSD FRML MDRD: 35 ML/MIN/1.73SQ M
GLUCOSE P FAST SERPL-MCNC: 107 MG/DL (ref 65–99)
HBA1C MFR BLD: 6.6 % (ref 4.2–6.3)
HDLC SERPL-MCNC: 58 MG/DL
LDLC SERPL CALC-MCNC: 119 MG/DL (ref 0–100)
POTASSIUM SERPL-SCNC: 3.9 MMOL/L (ref 3.5–5.3)
PROT SERPL-MCNC: 7.9 G/DL (ref 6.4–8.2)
SODIUM SERPL-SCNC: 139 MMOL/L (ref 136–145)
TRIGL SERPL-MCNC: 292 MG/DL
TSH SERPL DL<=0.05 MIU/L-ACNC: 31.8 UIU/ML (ref 0.36–3.74)

## 2019-12-06 PROCEDURE — 80053 COMPREHEN METABOLIC PANEL: CPT

## 2019-12-06 PROCEDURE — 84443 ASSAY THYROID STIM HORMONE: CPT

## 2019-12-06 PROCEDURE — 80061 LIPID PANEL: CPT

## 2019-12-06 PROCEDURE — 83036 HEMOGLOBIN GLYCOSYLATED A1C: CPT

## 2019-12-06 PROCEDURE — 36415 COLL VENOUS BLD VENIPUNCTURE: CPT

## 2019-12-09 ENCOUNTER — TELEPHONE (OUTPATIENT)
Dept: FAMILY MEDICINE CLINIC | Facility: CLINIC | Age: 79
End: 2019-12-09

## 2019-12-09 DIAGNOSIS — F41.9 ANXIETY: ICD-10-CM

## 2019-12-09 DIAGNOSIS — M54.16 LUMBAR RADICULOPATHY: ICD-10-CM

## 2019-12-09 RX ORDER — ALPRAZOLAM 0.25 MG/1
TABLET ORAL
Qty: 180 TABLET | Refills: 0 | Status: SHIPPED | OUTPATIENT
Start: 2019-12-09 | End: 2020-05-26 | Stop reason: SDUPTHER

## 2019-12-09 RX ORDER — HYDROCODONE BITARTRATE AND ACETAMINOPHEN 5; 325 MG/1; MG/1
1 TABLET ORAL EVERY 6 HOURS PRN
Qty: 30 TABLET | Refills: 0 | Status: SHIPPED | OUTPATIENT
Start: 2019-12-09 | End: 2020-04-30 | Stop reason: SDUPTHER

## 2019-12-09 NOTE — TELEPHONE ENCOUNTER
If we have dulera or asmanex ok to give her one of those,  If its asmanex, tell her she will need to use her rescue inhaler twice a day , because asmanex is only the steroid portion

## 2019-12-09 NOTE — TELEPHONE ENCOUNTER
Dr Karo Tang, Patient states if we were out of Symbicort to send a message to you because you would give her another inhaler sample?

## 2020-01-07 DIAGNOSIS — G47.00 INSOMNIA, UNSPECIFIED TYPE: ICD-10-CM

## 2020-01-07 DIAGNOSIS — M54.50 LUMBAR BACK PAIN: ICD-10-CM

## 2020-01-07 RX ORDER — MELOXICAM 15 MG/1
15 TABLET ORAL DAILY
Qty: 30 TABLET | Refills: 3 | Status: SHIPPED | OUTPATIENT
Start: 2020-01-07 | End: 2020-06-23 | Stop reason: SDUPTHER

## 2020-01-07 RX ORDER — TEMAZEPAM 30 MG/1
30 CAPSULE ORAL
Qty: 30 CAPSULE | Refills: 3 | Status: SHIPPED | OUTPATIENT
Start: 2020-01-07 | End: 2020-02-05 | Stop reason: ALTCHOICE

## 2020-02-05 ENCOUNTER — OFFICE VISIT (OUTPATIENT)
Dept: FAMILY MEDICINE CLINIC | Facility: CLINIC | Age: 80
End: 2020-02-05
Payer: COMMERCIAL

## 2020-02-05 VITALS
HEIGHT: 64 IN | TEMPERATURE: 98 F | DIASTOLIC BLOOD PRESSURE: 80 MMHG | SYSTOLIC BLOOD PRESSURE: 122 MMHG | WEIGHT: 202 LBS | HEART RATE: 72 BPM | BODY MASS INDEX: 34.49 KG/M2

## 2020-02-05 DIAGNOSIS — E11.9 DIABETIC EYE EXAM (HCC): ICD-10-CM

## 2020-02-05 DIAGNOSIS — E13.9 DIABETES 1.5, MANAGED AS TYPE 2 (HCC): ICD-10-CM

## 2020-02-05 DIAGNOSIS — I10 ESSENTIAL HYPERTENSION: ICD-10-CM

## 2020-02-05 DIAGNOSIS — E11.9 TYPE 2 DIABETES MELLITUS WITHOUT COMPLICATION, WITHOUT LONG-TERM CURRENT USE OF INSULIN (HCC): ICD-10-CM

## 2020-02-05 DIAGNOSIS — J44.9 CHRONIC OBSTRUCTIVE PULMONARY DISEASE, UNSPECIFIED COPD TYPE (HCC): ICD-10-CM

## 2020-02-05 DIAGNOSIS — Z00.00 MEDICARE ANNUAL WELLNESS VISIT, SUBSEQUENT: Primary | ICD-10-CM

## 2020-02-05 DIAGNOSIS — E78.00 HYPERCHOLESTEROLEMIA: ICD-10-CM

## 2020-02-05 DIAGNOSIS — G47.00 INSOMNIA, UNSPECIFIED TYPE: ICD-10-CM

## 2020-02-05 DIAGNOSIS — E03.9 HYPOTHYROIDISM, UNSPECIFIED TYPE: ICD-10-CM

## 2020-02-05 DIAGNOSIS — Z01.00 DIABETIC EYE EXAM (HCC): ICD-10-CM

## 2020-02-05 PROCEDURE — G0439 PPPS, SUBSEQ VISIT: HCPCS | Performed by: FAMILY MEDICINE

## 2020-02-05 PROCEDURE — 3074F SYST BP LT 130 MM HG: CPT | Performed by: FAMILY MEDICINE

## 2020-02-05 PROCEDURE — 1036F TOBACCO NON-USER: CPT | Performed by: FAMILY MEDICINE

## 2020-02-05 PROCEDURE — 1170F FXNL STATUS ASSESSED: CPT | Performed by: FAMILY MEDICINE

## 2020-02-05 PROCEDURE — 1125F AMNT PAIN NOTED PAIN PRSNT: CPT | Performed by: FAMILY MEDICINE

## 2020-02-05 PROCEDURE — 99214 OFFICE O/P EST MOD 30 MIN: CPT | Performed by: FAMILY MEDICINE

## 2020-02-05 PROCEDURE — 3079F DIAST BP 80-89 MM HG: CPT | Performed by: FAMILY MEDICINE

## 2020-02-05 PROCEDURE — 4040F PNEUMOC VAC/ADMIN/RCVD: CPT | Performed by: FAMILY MEDICINE

## 2020-02-05 PROCEDURE — 1160F RVW MEDS BY RX/DR IN RCRD: CPT | Performed by: FAMILY MEDICINE

## 2020-02-05 RX ORDER — LEVOTHYROXINE SODIUM 0.12 MG/1
125 TABLET ORAL
Qty: 30 TABLET | Refills: 5 | Status: SHIPPED | OUTPATIENT
Start: 2020-02-05 | End: 2020-08-13 | Stop reason: SDUPTHER

## 2020-02-05 RX ORDER — ZOLPIDEM TARTRATE 6.25 MG/1
6.25 TABLET, FILM COATED, EXTENDED RELEASE ORAL
Qty: 30 TABLET | Refills: 0 | Status: SHIPPED | OUTPATIENT
Start: 2020-02-05 | End: 2020-05-26 | Stop reason: SDUPTHER

## 2020-02-05 NOTE — PATIENT INSTRUCTIONS
Medicare Preventive Visit Patient Instructions  Thank you for completing your Welcome to Medicare Visit or Medicare Annual Wellness Visit today  Your next wellness visit will be due in one year (2/5/2021)  The screening/preventive services that you may require over the next 5-10 years are detailed below  Some tests may not apply to you based off risk factors and/or age  Screening tests ordered at today's visit but not completed yet may show as past due  Also, please note that scanned in results may not display below  Preventive Screenings:  Service Recommendations Previous Testing/Comments   Colorectal Cancer Screening  * Colonoscopy    * Fecal Occult Blood Test (FOBT)/Fecal Immunochemical Test (FIT)  * Fecal DNA/Cologuard Test  * Flexible Sigmoidoscopy Age: 54-65 years old   Colonoscopy: every 10 years (may be performed more frequently if at higher risk)  OR  FOBT/FIT: every 1 year  OR  Cologuard: every 3 years  OR  Sigmoidoscopy: every 5 years  Screening may be recommended earlier than age 48 if at higher risk for colorectal cancer  Also, an individualized decision between you and your healthcare provider will decide whether screening between the ages of 74-80 would be appropriate  Colonoscopy: 07/25/2017  FOBT/FIT: Not on file  Cologuard: Not on file  Sigmoidoscopy: Not on file    Screening Current     Breast Cancer Screening Age: 36 years old  Frequency: every 1-2 years  Not required if history of left and right mastectomy Mammogram: 04/17/2019    Screening Current   Cervical Cancer Screening Between the ages of 21-29, pap smear recommended once every 3 years  Between the ages of 33-67, can perform pap smear with HPV co-testing every 5 years     Recommendations may differ for women with a history of total hysterectomy, cervical cancer, or abnormal pap smears in past  Pap Smear: Not on file    Screening Not Indicated   Hepatitis C Screening Once for adults born between 1945 and 1965  More frequently in patients at high risk for Hepatitis C Hep C Antibody: Not on file       Diabetes Screening 1-2 times per year if you're at risk for diabetes or have pre-diabetes Fasting glucose: 107 mg/dL   A1C: 6 6 %    Screening Not Indicated  History Diabetes   Cholesterol Screening Once every 5 years if you don't have a lipid disorder  May order more often based on risk factors  Lipid panel: 12/06/2019    Screening Not Indicated  History Lipid Disorder     Other Preventive Screenings Covered by Medicare:  1  Abdominal Aortic Aneurysm (AAA) Screening: covered once if your at risk  You're considered to be at risk if you have a family history of AAA  2  Lung Cancer Screening: covers low dose CT scan once per year if you meet all of the following conditions: (1) Age 50-69; (2) No signs or symptoms of lung cancer; (3) Current smoker or have quit smoking within the last 15 years; (4) You have a tobacco smoking history of at least 30 pack years (packs per day multiplied by number of years you smoked); (5) You get a written order from a healthcare provider  3  Glaucoma Screening: covered annually if you're considered high risk: (1) You have diabetes OR (2) Family history of glaucoma OR (3)  aged 48 and older OR (3)  American aged 72 and older  3  Osteoporosis Screening: covered every 2 years if you meet one of the following conditions: (1) You're estrogen deficient and at risk for osteoporosis based off medical history and other findings; (2) Have a vertebral abnormality; (3) On glucocorticoid therapy for more than 3 months; (4) Have primary hyperparathyroidism; (5) On osteoporosis medications and need to assess response to drug therapy  · Last bone density test (DXA Scan): 02/20/2017  5  HIV Screening: covered annually if you're between the age of 12-76  Also covered annually if you are younger than 13 and older than 72 with risk factors for HIV infection   For pregnant patients, it is covered up to 3 times per pregnancy  Immunizations:  Immunization Recommendations   Influenza Vaccine Annual influenza vaccination during flu season is recommended for all persons aged >= 6 months who do not have contraindications   Pneumococcal Vaccine (Prevnar and Pneumovax)  * Prevnar = PCV13  * Pneumovax = PPSV23   Adults 25-60 years old: 1-3 doses may be recommended based on certain risk factors  Adults 72 years old: Prevnar (PCV13) vaccine recommended followed by Pneumovax (PPSV23) vaccine  If already received PPSV23 since turning 65, then PCV13 recommended at least one year after PPSV23 dose  Hepatitis B Vaccine 3 dose series if at intermediate or high risk (ex: diabetes, end stage renal disease, liver disease)   Tetanus (Td) Vaccine - COST NOT COVERED BY MEDICARE PART B Following completion of primary series, a booster dose should be given every 10 years to maintain immunity against tetanus  Td may also be given as tetanus wound prophylaxis  Tdap Vaccine - COST NOT COVERED BY MEDICARE PART B Recommended at least once for all adults  For pregnant patients, recommended with each pregnancy  Shingles Vaccine (Shingrix) - COST NOT COVERED BY MEDICARE PART B  2 shot series recommended in those aged 48 and above     Health Maintenance Due:      Topic Date Due    DXA SCAN  08/05/2020 (Originally 2/20/2020)    CRC Screening: Colonoscopy  07/25/2027     Immunizations Due:  There are no preventive care reminders to display for this patient  Advance Directives   What are advance directives? Advance directives are legal documents that state your wishes and plans for medical care  These plans are made ahead of time in case you lose your ability to make decisions for yourself  Advance directives can apply to any medical decision, such as the treatments you want, and if you want to donate organs  What are the types of advance directives? There are many types of advance directives, and each state has rules about how to use them  You may choose a combination of any of the following:  · Living will: This is a written record of the treatment you want  You can also choose which treatments you do not want, which to limit, and which to stop at a certain time  This includes surgery, medicine, IV fluid, and tube feedings  · Durable power of  for healthcare [facility]): This is a written record that states who you want to make healthcare choices for you when you are unable to make them for yourself  This person, called a proxy, is usually a family member or a friend  You may choose more than 1 proxy  · Do not resuscitate (DNR) order:  A DNR order is used in case your heart stops beating or you stop breathing  It is a request not to have certain forms of treatment, such as CPR  A DNR order may be included in other types of advance directives  · Medical directive: This covers the care that you want if you are in a coma, near death, or unable to make decisions for yourself  You can list the treatments you want for each condition  Treatment may include pain medicine, surgery, blood transfusions, dialysis, IV or tube feedings, and a ventilator (breathing machine)  · Values history: This document has questions about your views, beliefs, and how you feel and think about life  This information can help others choose the care that you would choose  Why are advance directives important? An advance directive helps you control your care  Although spoken wishes may be used, it is better to have your wishes written down  Spoken wishes can be misunderstood, or not followed  Treatments may be given even if you do not want them  An advance directive may make it easier for your family to make difficult choices about your care  Fall Prevention    Fall prevention  includes ways to make your home and other areas safer  It also includes ways you can move more carefully to prevent a fall   Health conditions that cause changes in your blood pressure, vision, or muscle strength and coordination may increase your risk for falls  Medicines may also increase your risk for falls if they make you dizzy, weak, or sleepy  Fall prevention tips:   · Stand or sit up slowly  · Use assistive devices as directed  · Wear shoes that fit well and have soles that   · Wear a personal alarm  · Stay active  · Manage your medical conditions  Home Safety Tips:  · Add items to prevent falls in the bathroom  · Keep paths clear  · Install bright lights in your home  · Keep items you use often on shelves within reach  · Paint or place reflective tape on the edges of your stairs  Weight Management   Why it is important to manage your weight:  Being overweight increases your risk of health conditions such as heart disease, high blood pressure, type 2 diabetes, and certain types of cancer  It can also increase your risk for osteoarthritis, sleep apnea, and other respiratory problems  Aim for a slow, steady weight loss  Even a small amount of weight loss can lower your risk of health problems  How to lose weight safely:  A safe and healthy way to lose weight is to eat fewer calories and get regular exercise  You can lose up about 1 pound a week by decreasing the number of calories you eat by 500 calories each day  Healthy meal plan for weight management:  A healthy meal plan includes a variety of foods, contains fewer calories, and helps you stay healthy  A healthy meal plan includes the following:  · Eat whole-grain foods more often  A healthy meal plan should contain fiber  Fiber is the part of grains, fruits, and vegetables that is not broken down by your body  Whole-grain foods are healthy and provide extra fiber in your diet  Some examples of whole-grain foods are whole-wheat breads and pastas, oatmeal, brown rice, and bulgur  · Eat a variety of vegetables every day    Include dark, leafy greens such as spinach, kale, deny greens, and mustard greens  Eat yellow and orange vegetables such as carrots, sweet potatoes, and winter squash  · Eat a variety of fruits every day  Choose fresh or canned fruit (canned in its own juice or light syrup) instead of juice  Fruit juice has very little or no fiber  · Eat low-fat dairy foods  Drink fat-free (skim) milk or 1% milk  Eat fat-free yogurt and low-fat cottage cheese  Try low-fat cheeses such as mozzarella and other reduced-fat cheeses  · Choose meat and other protein foods that are low in fat  Choose beans or other legumes such as split peas or lentils  Choose fish, skinless poultry (chicken or turkey), or lean cuts of red meat (beef or pork)  Before you cook meat or poultry, cut off any visible fat  · Use less fat and oil  Try baking foods instead of frying them  Add less fat, such as margarine, sour cream, regular salad dressing and mayonnaise to foods  Eat fewer high-fat foods  Some examples of high-fat foods include french fries, doughnuts, ice cream, and cakes  · Eat fewer sweets  Limit foods and drinks that are high in sugar  This includes candy, cookies, regular soda, and sweetened drinks  Exercise:  Exercise at least 30 minutes per day on most days of the week  Some examples of exercise include walking, biking, dancing, and swimming  You can also fit in more physical activity by taking the stairs instead of the elevator or parking farther away from stores  Ask your healthcare provider about the best exercise plan for you  © Copyright [employer] 2018 Information is for End User's use only and may not be sold, redistributed or otherwise used for commercial purposes   All illustrations and images included in CareNotes® are the copyrighted property of [employer] M [employer]  or [address] [place] [address] [employer]

## 2020-02-05 NOTE — PROGRESS NOTES
Assessment and Plan:     Problem List Items Addressed This Visit     None           Preventive health issues were discussed with patient, and age appropriate screening tests were ordered as noted in patient's After Visit Summary  Personalized health advice and appropriate referrals for health education or preventive services given if needed, as noted in patient's After Visit Summary  History of Present Illness:     Patient presents for Medicare Annual Wellness visit    Patient Care Team:  Rubi Condon DO as PCP - General  Guille Melendrez DO     Problem List:     There is no problem list on file for this patient  Past Medical and Surgical History:     No past medical history on file  Past Surgical History:   Procedure Laterality Date    CARDIAC SURGERY      HYSTERECTOMY      age 32    OOPHORECTOMY Right     age 32    US GUIDED BREAST BIOPSY RIGHT COMPLETE Right 2019      Family History:     Family History   Problem Relation Age of Onset    Hypertension Father         essential     Heart disease Father       Social History:     Social History     Socioeconomic History    Marital status:       Spouse name: None    Number of children: 3    Years of education: less than high school    Highest education level: None   Occupational History    None   Social Needs    Financial resource strain: None    Food insecurity:     Worry: None     Inability: None    Transportation needs:     Medical: None     Non-medical: None   Tobacco Use    Smoking status: Former Smoker     Last attempt to quit: 3/1/2019     Years since quittin 9    Smokeless tobacco: Never Used   Substance and Sexual Activity    Alcohol use: No    Drug use: Never    Sexual activity: None   Lifestyle    Physical activity:     Days per week: None     Minutes per session: None    Stress: None   Relationships    Social connections:     Talks on phone: None     Gets together: None     Attends Church service: None     Active member of club or organization: None     Attends meetings of clubs or organizations: None     Relationship status: None    Intimate partner violence:     Fear of current or ex partner: None     Emotionally abused: None     Physically abused: None     Forced sexual activity: None   Other Topics Concern    None   Social History Narrative    Daily coffee consumption:    Daily cola consumption:     Tea    Water intake, adequate (per day)       Medications and Allergies:     Current Outpatient Medications   Medication Sig Dispense Refill    acetaminophen (TYLENOL) 500 mg tablet Take 500 mg by mouth every 6 (six) hours      albuterol (2 5 mg/3 mL) 0 083 % nebulizer solution inhale contents of 1 vial in nebulizer every 6 hours if needed 300 mL 3    ALPRAZolam (XANAX) 0 25 mg tablet 1bid prn anxiety 180 tablet 0    amLODIPine (NORVASC) 5 mg tablet Take 1 tablet (5 mg total) by mouth daily 90 tablet 3    aspirin (ELISE LOW DOSE) 81 mg EC tablet Take by mouth      atorvastatin (LIPITOR) 40 mg tablet Take 1 tablet by mouth daily      budesonide-formoterol (SYMBICORT) 160-4 5 mcg/act inhaler Inhale 2 puffs 2 (two) times a day Rinse mouth after use   6 g 3    Calcium Carbonate-Vitamin D 600-200 MG-UNIT CAPS Take 1 tablet by mouth 2 (two) times a day      candesartan-hydrochlorothiazide (ATACAND HCT) 32-12 5 MG per tablet Take 1 tablet by mouth daily      carvedilol (COREG) 25 mg tablet Take 25 mg by mouth 2 (two) times a day with meals  0    clopidogrel (PLAVIX) 75 mg tablet   0    fluticasone (FLONASE) 50 mcg/act nasal spray 1 spray into each nostril daily 16 g 3    furosemide (LASIX) 40 mg tablet Take 1 tablet (40 mg total) by mouth 2 (two) times a day (Patient taking differently: Take 40 mg by mouth as needed  ) 180 tablet 3    HYDROcodone-acetaminophen (NORCO) 5-325 mg per tablet Take 1 tablet by mouth every 6 (six) hours as needed for painMax Daily Amount: 4 tablets 30 tablet 0    levothyroxine 100 mcg tablet take 1 tablet by mouth once daily 90 tablet 3    meloxicam (MOBIC) 15 mg tablet Take 1 tablet (15 mg total) by mouth daily 30 tablet 3    niacin (NIASPAN) 1000 MG CR tablet Take 1,000 mg by mouth      pantoprazole (PROTONIX) 40 mg tablet Take 1 tablet (40 mg total) by mouth 2 (two) times a day 180 tablet 3    temazepam (RESTORIL) 30 mg capsule Take 1 capsule (30 mg total) by mouth daily at bedtime as needed for sleep 30 capsule 3    tiotropium (SPIRIVA HANDIHALER) 18 mcg inhalation capsule Place 1 capsule into inhaler and inhale daily       No current facility-administered medications for this visit  No Known Allergies   Immunizations:     Immunization History   Administered Date(s) Administered    INFLUENZA 09/29/2018    Influenza Split High Dose Preservative Free IM 09/20/2011, 10/22/2013, 10/07/2014, 11/10/2015, 11/21/2016, 10/20/2017    Influenza TIV (IM) 10/24/2008, 10/14/2009, 11/04/2010    Influenza, high dose seasonal 0 5 mL 09/25/2019    Pneumococcal Conjugate 13-Valent 11/10/2015    Pneumococcal Polysaccharide PPV23 11/23/2007, 10/23/2014    Tdap 11/04/2010      Health Maintenance:         Topic Date Due    DXA SCAN  08/05/2020 (Originally 2/20/2020)    CRC Screening: Colonoscopy  07/25/2027     There are no preventive care reminders to display for this patient  Medicare Health Risk Assessment:     /80   Pulse 72   Temp 98 °F (36 7 °C)   Ht 5' 4" (1 626 m)   Wt 91 6 kg (202 lb)   BMI 34 67 kg/m²      Violet is here for her Subsequent Wellness visit  Last Medicare Wellness visit information reviewed, patient interviewed, no change since last AWV  Health Risk Assessment:   Patient rates overall health as fair  Patient feels that their physical health rating is slightly worse  Eyesight was rated as same  Hearing was rated as same  Patient feels that their emotional and mental health rating is same   Pain experienced in the last 7 days has been none  Patient states that she has experienced no weight loss or gain in last 6 months  Depression Screening:   PHQ-2 Score: 2      Fall Risk Screening: In the past year, patient has experienced: history of falling in past year    Number of falls: 1  Injured during fall?: Yes    Feels unsteady when standing or walking?: Yes    Worried about falling?: Yes      Urinary Incontinence Screening:   Patient has not leaked urine accidently in the last six months  Home Safety:  Patient has trouble with stairs inside or outside of their home  Patient has working smoke alarms and has working carbon monoxide detector  Home safety hazards include: none  Nutrition:   Current diet is Regular  Medications:   Patient is currently taking over-the-counter supplements  OTC medications include: see medication list  Patient is able to manage medications  Activities of Daily Living (ADLs)/Instrumental Activities of Daily Living (IADLs):   Walk and transfer into and out of bed and chair?: Yes  Dress and groom yourself?: Yes    Bathe or shower yourself?: Yes    Feed yourself? Yes  Do your laundry/housekeeping?: Yes  Manage your money, pay your bills and track your expenses?: Yes  Make your own meals?: Yes    Do your own shopping?: Yes    Previous Hospitalizations:   Any hospitalizations or ED visits within the last 12 months?: No      Advance Care Planning:   Living will: No    Durable POA for healthcare:  Yes    Advanced directive: No    Advanced directive counseling given: Yes    Five wishes given: Yes    Patient declined ACP directive: No    End of Life Decisions reviewed with patient: No      Cognitive Screening:   Provider or family/friend/caregiver concerned regarding cognition?: No    PREVENTIVE SCREENINGS      Cardiovascular Screening:    General: Screening Not Indicated and History Lipid Disorder      Diabetes Screening:     General: Screening Not Indicated and History Diabetes      Colorectal Cancer Screening: General: Screening Current      Breast Cancer Screening:     General: Screening Current      Cervical Cancer Screening:    General: Screening Not Indicated      Osteoporosis Screening:    General: Screening Current      Lung Cancer Screening:     General: Screening Not Indicated      Hepatitis C Screening:    General: Risks and Benefits Discussed    Hep C Screening Accepted: Yes        Juliana Gross DO

## 2020-02-07 NOTE — PROGRESS NOTES
Patient ID: Grazyna Avilez is a 78 y o  female  HPI: 78 y  o female presents for follow up of niddm, hypertension, uncontrolled hypothyroidism (levothyroxine needs to be increased) and pt complains of insomnia  The patient's COPD is well controlled using her nebulizer treatments of albuterol  SUBJECTIVE    Family History   Problem Relation Age of Onset    Hypertension Father         essential     Heart disease Father      Social History     Socioeconomic History    Marital status:      Spouse name: Not on file    Number of children: 3    Years of education: less than high school    Highest education level: Not on file   Occupational History    Not on file   Social Needs    Financial resource strain: Not on file    Food insecurity:     Worry: Not on file     Inability: Not on file    Transportation needs:     Medical: Not on file     Non-medical: Not on file   Tobacco Use    Smoking status: Former Smoker     Last attempt to quit: 3/1/2019     Years since quittin 0    Smokeless tobacco: Never Used   Substance and Sexual Activity    Alcohol use: No    Drug use: Never    Sexual activity: Not on file   Lifestyle    Physical activity:     Days per week: Not on file     Minutes per session: Not on file    Stress: Not on file   Relationships    Social connections:     Talks on phone: Not on file     Gets together: Not on file     Attends Church service: Not on file     Active member of club or organization: Not on file     Attends meetings of clubs or organizations: Not on file     Relationship status: Not on file    Intimate partner violence:     Fear of current or ex partner: Not on file     Emotionally abused: Not on file     Physically abused: Not on file     Forced sexual activity: Not on file   Other Topics Concern    Not on file   Social History Narrative    Daily coffee consumption:    Daily cola consumption:     Tea    Water intake, adequate (per day)     History reviewed  No pertinent past medical history    Past Surgical History:   Procedure Laterality Date    CARDIAC SURGERY      HYSTERECTOMY      age 32    OOPHORECTOMY Right     age 32    US GUIDED BREAST BIOPSY RIGHT COMPLETE Right 4/24/2019     No Known Allergies    Current Outpatient Medications:     acetaminophen (TYLENOL) 500 mg tablet, Take 500 mg by mouth every 6 (six) hours, Disp: , Rfl:     albuterol (2 5 mg/3 mL) 0 083 % nebulizer solution, inhale contents of 1 vial in nebulizer every 6 hours if needed, Disp: 300 mL, Rfl: 3    ALPRAZolam (XANAX) 0 25 mg tablet, 1bid prn anxiety, Disp: 180 tablet, Rfl: 0    amLODIPine (NORVASC) 5 mg tablet, Take 1 tablet (5 mg total) by mouth daily, Disp: 90 tablet, Rfl: 3    aspirin (ELISE LOW DOSE) 81 mg EC tablet, Take by mouth, Disp: , Rfl:     atorvastatin (LIPITOR) 40 mg tablet, Take 1 tablet by mouth daily, Disp: , Rfl:     budesonide-formoterol (SYMBICORT) 160-4 5 mcg/act inhaler, Inhale 2 puffs 2 (two) times a day Rinse mouth after use , Disp: 6 g, Rfl: 3    Calcium Carbonate-Vitamin D 600-200 MG-UNIT CAPS, Take 1 tablet by mouth 2 (two) times a day, Disp: , Rfl:     candesartan-hydrochlorothiazide (ATACAND HCT) 32-12 5 MG per tablet, Take 1 tablet by mouth daily, Disp: , Rfl:     carvedilol (COREG) 25 mg tablet, Take 25 mg by mouth 2 (two) times a day with meals, Disp: , Rfl: 0    clopidogrel (PLAVIX) 75 mg tablet, , Disp: , Rfl: 0    fluticasone (FLONASE) 50 mcg/act nasal spray, 1 spray into each nostril daily, Disp: 16 g, Rfl: 3    furosemide (LASIX) 40 mg tablet, Take 1 tablet (40 mg total) by mouth 2 (two) times a day (Patient taking differently: Take 40 mg by mouth as needed  ), Disp: 180 tablet, Rfl: 3    HYDROcodone-acetaminophen (NORCO) 5-325 mg per tablet, Take 1 tablet by mouth every 6 (six) hours as needed for painMax Daily Amount: 4 tablets, Disp: 30 tablet, Rfl: 0    levothyroxine 100 mcg tablet, take 1 tablet by mouth once daily, Disp: 90 tablet, Rfl: 3    meloxicam (MOBIC) 15 mg tablet, Take 1 tablet (15 mg total) by mouth daily, Disp: 30 tablet, Rfl: 3    niacin (NIASPAN) 1000 MG CR tablet, Take 1,000 mg by mouth, Disp: , Rfl:     pantoprazole (PROTONIX) 40 mg tablet, Take 1 tablet (40 mg total) by mouth 2 (two) times a day, Disp: 180 tablet, Rfl: 3    tiotropium (SPIRIVA HANDIHALER) 18 mcg inhalation capsule, Place 1 capsule into inhaler and inhale daily, Disp: , Rfl:     levothyroxine 125 mcg tablet, Take 1 tablet (125 mcg total) by mouth daily in the early morning, Disp: 30 tablet, Rfl: 5    zolpidem (AMBIEN CR) 6 25 MG CR tablet, Take 1 tablet (6 25 mg total) by mouth daily at bedtime as needed for sleep, Disp: 30 tablet, Rfl: 0    Review of Systems  Constitutional:     Denies fever, chills ,+fatigue ,weakness ,weight loss, weight gain     ENT: Denies earache ,loss of hearing ,nosebleed, nasal discharge,nasal congestion ,sore throat ,hoarseness  Pulmonary: Denies shortness of breath ,cough  ,dyspnea on exertion, orthopnea  ,PND   Cardiovascular:  Denies bradycardia , tachycardia  ,palpations, lower extremity edema leg, claudication  Breast:  Denies new or changing breast lumps ,nipple discharge ,nipple changes  Abdomen:  Denies abdominal pain , anorexia , indigestion, nausea, vomiting, constipation, diarrhea  Musculoskeletal: Denies myalgias, arthralgias, joint swelling, joint stiffness , limb pain, limb swelling  Gu: denies dysuria, polyuria  Skin: Denies skin rash, skin lesion, skin wound, itching, dry skin  Neuro: Denies headache, numbness, tingling, confusion, loss of consciousness, dizziness, vertigo  Psychiatric: Denies feelings of depression, suicidal ideation, anxiety,+ sleep disturbances    OBJECTIVE  /80   Pulse 72   Temp 98 °F (36 7 °C)   Ht 5' 4" (1 626 m)   Wt 91 6 kg (202 lb)   BMI 34 67 kg/m²   Constitutional:   NAD, well appearing and well nourished      ENT:   Conjunctiva and lids: no injection, edema, or discharge Pupils and iris: MILADY bilaterally    External inspection of ears and nose: normal without deformities or discharge  Otoscopic exam: Canals patent without erythema  Nasal mucosa, septum and turbinates: Normal or edema or discharge         Oropharynx:  Moist mucosa, normal tongue and tonsils without lesions  No erythema        Pulmonary:Respiratory effort normal rate and rhythm, no increased work of breathing  Auscultation of lungs:  Clear bilaterally with no adventitious breath sounds       Cardiovascular: regular rate and rhythm, S1 and S2, no murmur, no edema and/or varicosities of LE      Abdomen: Soft and non-distended     Positive bowel sounds      No heptomegaly or splenomegaly      Gu: no suprapubic tenderness or CVA tenderness, no urethral discharge  Lymphatic:  No anterior or posterior cervical lymphadenopathy         Musculoskeletal:  Gait and station: Normal gait      Digits and nails normal without clubbing or cyanosis       Inspection/palpation of joints, bones, and muscles:  No joint tenderness, swelling, full active and passive range of motion       Skin: Normal skin turgor and no rashes      Neuro:       Normal reflexes     Psych:   alert and oriented to person, place and time     normal mood and affect       Assessment/Plan:Diagnoses and all orders for this visit:    Medicare annual wellness visit, subsequent    Type 2 diabetes mellitus without complication, without long-term current use of insulin ([facility] [address] )  -     HEMOGLOBIN A1C W/ EAG ESTIMATION; Future    Hypothyroidism, unspecified type  -     levothyroxine 125 mcg tablet; Take 1 tablet (125 mcg total) by mouth daily in the early morning  -     TSH, 3rd generation; Future    Insomnia, unspecified type  -     zolpidem (AMBIEN CR) 6 25 MG CR tablet; Take 1 tablet (6 25 mg total) by mouth daily at bedtime as needed for sleep    Essential hypertension  -     Comprehensive metabolic panel;  Future    Diabetes 1 5, managed as type 2 ([facility] )    Hypercholesterolemia  -     Lipid Panel with Direct LDL reflex; Future    Diabetic eye exam [facility])  -     Ambulatory referral to Ophthalmology; Future    Chronic obstructive pulmonary disease, unspecified COPD type ([facility] )  Comments:  Controlled with nebulizer treatments with albuterol and inhaled steroids  Reviewed with patient plan to treat with above plan      Patient instructed to call in 72 hours if not feeling better or if symptoms worsen

## 2020-03-10 ENCOUNTER — TELEPHONE (OUTPATIENT)
Dept: FAMILY MEDICINE CLINIC | Facility: CLINIC | Age: 80
End: 2020-03-10

## 2020-03-10 NOTE — TELEPHONE ENCOUNTER
Rufina, I would rather do a prior auth   She is a patient with both heart disease and copd and can't risk respiratory depression

## 2020-03-10 NOTE — TELEPHONE ENCOUNTER
Patient called stating her Ambien 6 25mg is not covered under her insurance  She said you had her on a medication in the past that started with a "T" for sleeping medication? Could you call this in?    [facility]   Aware to check with pharmacy

## 2020-03-19 ENCOUNTER — TELEPHONE (OUTPATIENT)
Dept: FAMILY MEDICINE CLINIC | Facility: CLINIC | Age: 80
End: 2020-03-19

## 2020-03-19 NOTE — TELEPHONE ENCOUNTER
Patient is requesting samples of Spiriva or Symbicort  If samples are not available she would like to know what Dr Helga Morejon wants her to do

## 2020-03-20 ENCOUNTER — TELEPHONE (OUTPATIENT)
Dept: FAMILY MEDICINE CLINIC | Facility: CLINIC | Age: 80
End: 2020-03-20

## 2020-03-20 NOTE — TELEPHONE ENCOUNTER
Patient called  She uses her nebulizer daily  She had gotten this one from someone else  The motor just   She will need an order for nebulizer with notation previous machine broken beyond repair  Can you also amend your February OV to include daily use of nebulizer?  I will fax this with her insurance and contact information to [phone]

## 2020-04-02 DIAGNOSIS — R60.9 EDEMA, UNSPECIFIED TYPE: ICD-10-CM

## 2020-04-02 RX ORDER — FUROSEMIDE 40 MG/1
40 TABLET ORAL 2 TIMES DAILY
Qty: 180 TABLET | Refills: 3 | Status: SHIPPED | OUTPATIENT
Start: 2020-04-02 | End: 2020-07-20

## 2020-04-30 DIAGNOSIS — M54.16 LUMBAR RADICULOPATHY: ICD-10-CM

## 2020-04-30 RX ORDER — HYDROCODONE BITARTRATE AND ACETAMINOPHEN 5; 325 MG/1; MG/1
1 TABLET ORAL EVERY 6 HOURS PRN
Qty: 30 TABLET | Refills: 0 | Status: SHIPPED | OUTPATIENT
Start: 2020-04-30 | End: 2020-10-13 | Stop reason: SDUPTHER

## 2020-05-19 ENCOUNTER — APPOINTMENT (OUTPATIENT)
Dept: LAB | Facility: CLINIC | Age: 80
End: 2020-05-19
Payer: COMMERCIAL

## 2020-05-19 DIAGNOSIS — E11.9 TYPE 2 DIABETES MELLITUS WITHOUT COMPLICATION, WITHOUT LONG-TERM CURRENT USE OF INSULIN (HCC): ICD-10-CM

## 2020-05-19 DIAGNOSIS — I10 ESSENTIAL HYPERTENSION: ICD-10-CM

## 2020-05-19 DIAGNOSIS — E78.00 HYPERCHOLESTEROLEMIA: ICD-10-CM

## 2020-05-19 DIAGNOSIS — E03.9 HYPOTHYROIDISM, UNSPECIFIED TYPE: ICD-10-CM

## 2020-05-19 LAB
ALBUMIN SERPL BCP-MCNC: 3.6 G/DL (ref 3.5–5)
ALP SERPL-CCNC: 73 U/L (ref 46–116)
ALT SERPL W P-5'-P-CCNC: 24 U/L (ref 12–78)
ANION GAP SERPL CALCULATED.3IONS-SCNC: 6 MMOL/L (ref 4–13)
AST SERPL W P-5'-P-CCNC: 20 U/L (ref 5–45)
BILIRUB SERPL-MCNC: 0.36 MG/DL (ref 0.2–1)
BUN SERPL-MCNC: 30 MG/DL (ref 5–25)
CALCIUM SERPL-MCNC: 9.2 MG/DL (ref 8.3–10.1)
CHLORIDE SERPL-SCNC: 104 MMOL/L (ref 100–108)
CHOLEST SERPL-MCNC: 200 MG/DL (ref 50–200)
CO2 SERPL-SCNC: 28 MMOL/L (ref 21–32)
CREAT SERPL-MCNC: 1.26 MG/DL (ref 0.6–1.3)
EST. AVERAGE GLUCOSE BLD GHB EST-MCNC: 140 MG/DL
GFR SERPL CREATININE-BSD FRML MDRD: 41 ML/MIN/1.73SQ M
GLUCOSE P FAST SERPL-MCNC: 118 MG/DL (ref 65–99)
HBA1C MFR BLD: 6.5 %
HDLC SERPL-MCNC: 59 MG/DL
LDLC SERPL CALC-MCNC: 97 MG/DL (ref 0–100)
POTASSIUM SERPL-SCNC: 4 MMOL/L (ref 3.5–5.3)
PROT SERPL-MCNC: 7.6 G/DL (ref 6.4–8.2)
SODIUM SERPL-SCNC: 138 MMOL/L (ref 136–145)
TRIGL SERPL-MCNC: 219 MG/DL
TSH SERPL DL<=0.05 MIU/L-ACNC: 0.08 UIU/ML (ref 0.36–3.74)

## 2020-05-19 PROCEDURE — 36415 COLL VENOUS BLD VENIPUNCTURE: CPT

## 2020-05-19 PROCEDURE — 84443 ASSAY THYROID STIM HORMONE: CPT

## 2020-05-19 PROCEDURE — 83036 HEMOGLOBIN GLYCOSYLATED A1C: CPT

## 2020-05-19 PROCEDURE — 80053 COMPREHEN METABOLIC PANEL: CPT

## 2020-05-19 PROCEDURE — 80061 LIPID PANEL: CPT

## 2020-05-26 DIAGNOSIS — G47.00 INSOMNIA, UNSPECIFIED TYPE: ICD-10-CM

## 2020-05-26 DIAGNOSIS — F41.9 ANXIETY: ICD-10-CM

## 2020-05-26 RX ORDER — ZOLPIDEM TARTRATE 6.25 MG/1
6.25 TABLET, FILM COATED, EXTENDED RELEASE ORAL
Qty: 30 TABLET | Refills: 0 | Status: SHIPPED | OUTPATIENT
Start: 2020-05-26 | End: 2020-08-13 | Stop reason: SDUPTHER

## 2020-05-26 RX ORDER — ALPRAZOLAM 0.25 MG/1
TABLET ORAL
Qty: 180 TABLET | Refills: 0 | Status: SHIPPED | OUTPATIENT
Start: 2020-05-26 | End: 2020-08-24

## 2020-06-23 DIAGNOSIS — M54.50 LUMBAR BACK PAIN: ICD-10-CM

## 2020-06-23 RX ORDER — MELOXICAM 15 MG/1
15 TABLET ORAL DAILY
Qty: 30 TABLET | Refills: 3 | Status: SHIPPED | OUTPATIENT
Start: 2020-06-23 | End: 2020-07-20

## 2020-07-14 ENCOUNTER — OFFICE VISIT (OUTPATIENT)
Dept: FAMILY MEDICINE CLINIC | Facility: CLINIC | Age: 80
End: 2020-07-14
Payer: COMMERCIAL

## 2020-07-14 VITALS
HEIGHT: 64 IN | HEART RATE: 74 BPM | TEMPERATURE: 98.3 F | DIASTOLIC BLOOD PRESSURE: 78 MMHG | BODY MASS INDEX: 33.46 KG/M2 | SYSTOLIC BLOOD PRESSURE: 124 MMHG | WEIGHT: 196 LBS

## 2020-07-14 DIAGNOSIS — J44.9 CHRONIC OBSTRUCTIVE PULMONARY DISEASE, UNSPECIFIED COPD TYPE (HCC): ICD-10-CM

## 2020-07-14 DIAGNOSIS — N17.9 ACUTE RENAL FAILURE, UNSPECIFIED ACUTE RENAL FAILURE TYPE (HCC): Primary | ICD-10-CM

## 2020-07-14 DIAGNOSIS — J18.9 PNEUMONIA OF BOTH LOWER LOBES DUE TO INFECTIOUS ORGANISM: ICD-10-CM

## 2020-07-14 PROCEDURE — 3044F HG A1C LEVEL LT 7.0%: CPT | Performed by: FAMILY MEDICINE

## 2020-07-14 PROCEDURE — 3074F SYST BP LT 130 MM HG: CPT | Performed by: FAMILY MEDICINE

## 2020-07-14 PROCEDURE — 3078F DIAST BP <80 MM HG: CPT | Performed by: FAMILY MEDICINE

## 2020-07-14 PROCEDURE — 99214 OFFICE O/P EST MOD 30 MIN: CPT | Performed by: FAMILY MEDICINE

## 2020-07-14 PROCEDURE — 4040F PNEUMOC VAC/ADMIN/RCVD: CPT | Performed by: FAMILY MEDICINE

## 2020-07-14 PROCEDURE — 1036F TOBACCO NON-USER: CPT | Performed by: FAMILY MEDICINE

## 2020-07-14 PROCEDURE — 3066F NEPHROPATHY DOC TX: CPT | Performed by: FAMILY MEDICINE

## 2020-07-14 PROCEDURE — 1160F RVW MEDS BY RX/DR IN RCRD: CPT | Performed by: FAMILY MEDICINE

## 2020-07-14 NOTE — PROGRESS NOTES
BMI Counseling: Body mass index is 33 64 kg/m²  The BMI is above normal  Nutrition recommendations include reducing portion sizes and decreasing overall calorie intake  Patient ID: Carrillo Bray is a 78 y o  female  HPI: 78 y  o female presenting s/p hospitalization at [facility] for bilateral pneumonia and acute renal failure  Her renal function did not return to normal after she was discharged  She is feeling better with breathing and denies fever, cough or dyspnea  Todays labs revealed a bun of 49 and creatinine of 1 54  We discussed a nephrology consultation and she is in agreement  SUBJECTIVE    Family History   Problem Relation Age of Onset    Hypertension Father         essential     Heart disease Father      Social History     Socioeconomic History    Marital status:       Spouse name: Not on file    Number of children: 3    Years of education: less than high school    Highest education level: Not on file   Occupational History    Not on file   Social Needs    Financial resource strain: Not on file    Food insecurity:     Worry: Not on file     Inability: Not on file    Transportation needs:     Medical: Not on file     Non-medical: Not on file   Tobacco Use    Smoking status: Former Smoker     Last attempt to quit: 3/1/2019     Years since quittin 3    Smokeless tobacco: Never Used   Substance and Sexual Activity    Alcohol use: No    Drug use: Never    Sexual activity: Not on file   Lifestyle    Physical activity:     Days per week: Not on file     Minutes per session: Not on file    Stress: Not on file   Relationships    Social connections:     Talks on phone: Not on file     Gets together: Not on file     Attends Yazidi service: Not on file     Active member of club or organization: Not on file     Attends meetings of clubs or organizations: Not on file     Relationship status: Not on file    Intimate partner violence:     Fear of current or ex partner: Not on file     Emotionally abused: Not on file     Physically abused: Not on file     Forced sexual activity: Not on file   Other Topics Concern    Not on file   Social History Narrative    Daily coffee consumption:    Daily cola consumption:     Tea    Water intake, adequate (per day)     No past medical history on file    Past Surgical History:   Procedure Laterality Date    CARDIAC SURGERY      HYSTERECTOMY      age 32    OOPHORECTOMY Right     age 30    US GUIDED BREAST BIOPSY RIGHT COMPLETE Right 4/24/2019     No Known Allergies    Current Outpatient Medications:     acetaminophen (TYLENOL) 500 mg tablet, Take 500 mg by mouth every 6 (six) hours, Disp: , Rfl:     albuterol (2 5 mg/3 mL) 0 083 % nebulizer solution, inhale contents of 1 vial in nebulizer every 6 hours if needed, Disp: 300 mL, Rfl: 3    ALPRAZolam (XANAX) 0 25 mg tablet, 1bid prn anxiety, Disp: 180 tablet, Rfl: 0    amLODIPine (NORVASC) 5 mg tablet, Take 1 tablet (5 mg total) by mouth daily, Disp: 90 tablet, Rfl: 3    aspirin (ELISE LOW DOSE) 81 mg EC tablet, Take by mouth, Disp: , Rfl:     atorvastatin (LIPITOR) 40 mg tablet, Take 1 tablet by mouth daily, Disp: , Rfl:     budesonide-formoterol (SYMBICORT) 160-4 5 mcg/act inhaler, Inhale 2 puffs 2 (two) times a day Rinse mouth after use , Disp: 6 g, Rfl: 3    Calcium Carbonate-Vitamin D 600-200 MG-UNIT CAPS, Take 1 tablet by mouth 2 (two) times a day, Disp: , Rfl:     candesartan-hydrochlorothiazide (ATACAND HCT) 32-12 5 MG per tablet, Take 1 tablet by mouth daily, Disp: , Rfl:     carvedilol (COREG) 25 mg tablet, Take 25 mg by mouth 2 (two) times a day with meals, Disp: , Rfl: 0    clopidogrel (PLAVIX) 75 mg tablet, , Disp: , Rfl: 0    fluticasone (FLONASE) 50 mcg/act nasal spray, 1 spray into each nostril daily, Disp: 16 g, Rfl: 3    furosemide (LASIX) 40 mg tablet, Take 1 tablet (40 mg total) by mouth 2 (two) times a day, Disp: 180 tablet, Rfl: 3    HYDROcodone-acetaminophen (NORCO) 5-325 mg per tablet, Take 1 tablet by mouth every 6 (six) hours as needed for painMax Daily Amount: 4 tablets, Disp: 30 tablet, Rfl: 0    levothyroxine 125 mcg tablet, Take 1 tablet (125 mcg total) by mouth daily in the early morning, Disp: 30 tablet, Rfl: 5    meloxicam (MOBIC) 15 mg tablet, Take 1 tablet (15 mg total) by mouth daily, Disp: 30 tablet, Rfl: 3    niacin (NIASPAN) 1000 MG CR tablet, Take 1,000 mg by mouth, Disp: , Rfl:     pantoprazole (PROTONIX) 40 mg tablet, Take 1 tablet (40 mg total) by mouth 2 (two) times a day, Disp: 180 tablet, Rfl: 3    tiotropium (SPIRIVA HANDIHALER) 18 mcg inhalation capsule, Place 1 capsule into inhaler and inhale daily, Disp: , Rfl:     zolpidem (AMBIEN CR) 6 25 MG CR tablet, Take 1 tablet (6 25 mg total) by mouth daily at bedtime as needed for sleep, Disp: 30 tablet, Rfl: 0    Review of Systems  Constitutional:     Denies fever, chills ,fatigue ,weakness ,weight loss, weight gain     ENT: Denies earache ,loss of hearing ,nosebleed, nasal discharge,nasal congestion ,sore throat ,hoarseness  Pulmonary: Denies shortness of breath ,cough  ,dyspnea on exertion, orthopnea  ,PND   Cardiovascular:  Denies bradycardia , tachycardia  ,palpations, lower extremity edema leg, claudication  Breast:  Denies new or changing breast lumps ,nipple discharge ,nipple changes  Abdomen:  Denies abdominal pain , anorexia , indigestion, nausea, vomiting, constipation, diarrhea  Musculoskeletal: Denies myalgias, arthralgias, joint swelling, joint stiffness , limb pain, limb swelling  Gu: denies dysuria, polyuria  Skin: Denies skin rash, skin lesion, skin wound, itching, dry skin  Neuro: Denies headache, numbness, tingling, confusion, loss of consciousness, dizziness, vertigo  Psychiatric: Denies feelings of depression, suicidal ideation, anxiety, sleep disturbances    OBJECTIVE  /78   Pulse 74   Temp 98 3 °F (36 8 °C)   Ht 5' 4" (1 626 m)   Wt 88 9 kg (196 lb)   BMI 33 64 kg/m²   Constitutional:   NAD, well appearing and well nourished      ENT:   Conjunctiva and lids: no injection, edema, or discharge     Pupils and iris: MILADY bilaterally    External inspection of ears and nose: normal without deformities or discharge  Otoscopic exam: Canals patent without erythema  Nasal mucosa, septum and turbinates: Normal or edema or discharge         Oropharynx:  Moist mucosa, normal tongue and tonsils without lesions  No erythema        Pulmonary:Respiratory effort normal rate and rhythm, no increased work of breathing  Auscultation of lungs:  Decreased  breath sounds       Cardiovascular: regular rate and rhythm, S1 and S2, no murmur, no edema and/or varicosities of LE      Abdomen: Soft and non-distended     Positive bowel sounds      No heptomegaly or splenomegaly      Gu: no suprapubic tenderness or CVA tenderness, no urethral discharge  Lymphatic:  No anterior or posterior cervical lymphadenopathy         Musculoskeletal:  Gait and station: Normal gait      Digits and nails normal without clubbing or cyanosis       Inspection/palpation of joints, bones, and muscles:  No joint tenderness, swelling, full active and passive range of motion       Skin: Normal skin turgor and no rashes      Neuro:    Normal reflexes     Psych:   alert and oriented to person, place and time     normal mood and affect       Assessment/Plan:Diagnoses and all orders for this visit:    Acute renal failure, unspecified acute renal failure type ([facility] )  -     US kidney and bladder; Future  -     BUN; Future  -     Creatinine, serum; Future  -     Ambulatory referral to Nephrology; Future    Pneumonia of both lower lobes due to infectious organism  Comments:  Pt for a repeat CXR in one week and to see pulmonologist in 2 weeks  Chronic obstructive pulmonary disease, unspecified COPD type ([facility] )  Comments:  Continue current regimen of inhalers  I will see patient back in 4 mos or sooner prn

## 2020-07-16 ENCOUNTER — HOSPITAL ENCOUNTER (OUTPATIENT)
Dept: ULTRASOUND IMAGING | Facility: HOSPITAL | Age: 80
Discharge: HOME | End: 2020-07-16
Payer: COMMERCIAL

## 2020-07-16 DIAGNOSIS — N17.9 ACUTE RENAL FAILURE, UNSPECIFIED ACUTE RENAL FAILURE TYPE (HCC): ICD-10-CM

## 2020-07-16 PROBLEM — J18.9 BILATERAL PNEUMONIA: Status: ACTIVE | Noted: 2020-07-03

## 2020-07-16 PROBLEM — M85.80 OSTEOPENIA: Status: ACTIVE | Noted: 2017-02-21

## 2020-07-16 PROCEDURE — 76770 US EXAM ABDO BACK WALL COMP: CPT

## 2020-07-17 ENCOUNTER — TELEPHONE (OUTPATIENT)
Dept: FAMILY MEDICINE CLINIC | Facility: CLINIC | Age: 80
End: 2020-07-17

## 2020-07-17 NOTE — TELEPHONE ENCOUNTER
Please call nephro pt 's kidney function has abruptly declined and she has chronic CHF and needs to be on diuretics   I put consult in

## 2020-07-17 NOTE — TELEPHONE ENCOUNTER
Patient was seen tueugene   You referred to nephrology and the office has not contacted them yet   She said she was told to call Friday morning is the office did not contact them and you would call them to get her in ASAP

## 2020-07-20 ENCOUNTER — CONSULT (OUTPATIENT)
Dept: NEPHROLOGY | Facility: CLINIC | Age: 80
End: 2020-07-20
Payer: COMMERCIAL

## 2020-07-20 VITALS — RESPIRATION RATE: 15 BRPM | SYSTOLIC BLOOD PRESSURE: 102 MMHG | DIASTOLIC BLOOD PRESSURE: 62 MMHG | HEART RATE: 76 BPM

## 2020-07-20 DIAGNOSIS — I25.10 ATHEROSCLEROSIS OF CORONARY ARTERY OF NATIVE HEART WITHOUT ANGINA PECTORIS, UNSPECIFIED VESSEL OR LESION TYPE: ICD-10-CM

## 2020-07-20 DIAGNOSIS — N17.9 AKI (ACUTE KIDNEY INJURY) (HCC): Primary | ICD-10-CM

## 2020-07-20 DIAGNOSIS — I73.9 PAD (PERIPHERAL ARTERY DISEASE) (HCC): ICD-10-CM

## 2020-07-20 DIAGNOSIS — N18.31 CKD STAGE G3A/A1, GFR 45-59 AND ALBUMIN CREATININE RATIO <30 MG/G (HCC): ICD-10-CM

## 2020-07-20 DIAGNOSIS — E66.9 OBESITY (BMI 30.0-34.9): ICD-10-CM

## 2020-07-20 DIAGNOSIS — E13.9 DIABETES 1.5, MANAGED AS TYPE 2 (HCC): ICD-10-CM

## 2020-07-20 DIAGNOSIS — I50.33 ACUTE ON CHRONIC DIASTOLIC CONGESTIVE HEART FAILURE (HCC): ICD-10-CM

## 2020-07-20 DIAGNOSIS — E78.00 HYPERCHOLESTEROLEMIA: ICD-10-CM

## 2020-07-20 DIAGNOSIS — I10 ESSENTIAL HYPERTENSION: ICD-10-CM

## 2020-07-20 PROCEDURE — 99204 OFFICE O/P NEW MOD 45 MIN: CPT | Performed by: INTERNAL MEDICINE

## 2020-07-20 PROCEDURE — 3074F SYST BP LT 130 MM HG: CPT | Performed by: INTERNAL MEDICINE

## 2020-07-20 PROCEDURE — 3078F DIAST BP <80 MM HG: CPT | Performed by: INTERNAL MEDICINE

## 2020-07-20 PROCEDURE — 3044F HG A1C LEVEL LT 7.0%: CPT | Performed by: INTERNAL MEDICINE

## 2020-07-20 PROCEDURE — 1160F RVW MEDS BY RX/DR IN RCRD: CPT | Performed by: INTERNAL MEDICINE

## 2020-07-20 RX ORDER — ROSUVASTATIN CALCIUM 40 MG/1
40 TABLET, COATED ORAL DAILY
COMMUNITY

## 2020-07-20 RX ORDER — CARVEDILOL 12.5 MG/1
12.5 TABLET ORAL 2 TIMES DAILY WITH MEALS
Qty: 60 TABLET | Refills: 4 | Status: SHIPPED | OUTPATIENT
Start: 2020-07-20 | End: 2020-12-28 | Stop reason: SDUPTHER

## 2020-07-20 RX ORDER — TORSEMIDE 20 MG/1
20 TABLET ORAL DAILY
Qty: 30 TABLET | Refills: 4 | Status: SHIPPED | OUTPATIENT
Start: 2020-07-20 | End: 2020-12-28 | Stop reason: SDUPTHER

## 2020-07-20 NOTE — PROGRESS NOTES
Nephrology Initial Consultation  Iris Pacheco 78 y o  female MRN: [id number]            REASON FOR CONSULTATION:  Km Hall is a 78 y  o female who was referred by Robert Lim DO for evaluation of Consult and Chronic Kidney Disease    ASSESSMENT / PLAN:   78 y o   female with pmh of multiple co-morbidities including  PAD s/p bilateral lower extremity angioplasty, CAD s/p 2 stents,  emphysema, hypertension (since 2007), CHF with EF of 55-60%, hypothyroidism, hypercholesterolemia, obesity, AFib and CKD stage 3 presents to the office for evaluation and management of abnormal renal parameters  TIFFANIE on CKD stage 3:  - Patient has a baseline creatinine of 0 9-1 2 mg/dL dating as far back as 2016  Most recent labs show a Creatinine of 1 54 mg/dL as of 07/13/2020  Renal function slightly elevated  - etiology of acute kidney injury unclear at this time  Likely contribution of renal venous congestion/cardiorenal syndrome plus failure to auto regulate in the presence of NSAIDs of Mobic as well as candesartan  +patient was on HCTZ along with Lasix  - likely has underlying CKD secondary to diabetic kidney disease plus hypertensive nephrosclerosis plus age-related nephron loss plus obesity related FSGS and NSAID nephropathy  Will still rule out paraproteinemia and cystic disease check SPEP, UPEP, free light chain ratio  - Will check renal ultrasound to evaluate kidney size, echogenicity and r/o cysts  Performed on 07/16/2020 reports pending  Awaiting results  - Acid base and lytes stable  - Clinically the patient appears to be minimally hypervolemic  DC candesartan/HCTZ and Lasix  And start torsemide 20 mg p o  Q day check daily weights and call with updates in 10 days to see if dose needs adjustments   - Recommend to avoid use of NSAIDs, nephrotoxins   Caution advised with regards to exposure to IV contrast dye    - Discussed with the patient in depth her renal status, including the possible etiologies for CKD  - Advised the patient that when her GFR is close to 20mL/min then will start discussing about RRT(renal replacement therapy) options such as renal transplant, peritoneal dialysis and hemodialysis  - Informed the patient about the various options for Renal Replacement therapy  - Discussed with the patient how we need to work together to delay the progression of CKD with optimal BP control based on their age and co-morbidities, optimal BS control with HbA1c of <7% and trying to reduce proteinuria by the use of anti-proteinuric agents  Hypertension:  - Patient is on Norvasc 5 mg p o  Q day, candesartan/HCTZ 32/12 5 mg p o  Q day, Coreg 25 mg p o  B i d   Lasix 40 mg p o  Q24    - in light of elevated creatinine and concern for worsening acute kidney injury  DC Norvasc, candesartan/HCTZ, Lasix  - change Coreg to 12 5 mg p o  B i d , start torsemide 20 mg p o  Q day  Advised patient to take Norvasc 5 mg p o  Q day if SBP consistently greater than 150  To check daily weights and daily blood pressures twice a day and call with updates in 10 days  - also advised patient to follow-up with cardiology  - Goal BP of <  140/90 based on age and comorbidities  - Instructed to follow low sodium (2gm)diet  - at this time in light of acute kidney injury will hold off on ARB  Once creatinine stable and back to baseline may resume it since she has been on it for a long time  Hemoglobin:  - Goal Hb of 10-12 g/dL  - Most recent labs suggestive of 12 1 grams/deciliter  - no role for IV iron at this time    CKD-MBD(Mineral Bone Disease): - Based on patients CKD stage following is the goal of therapy    - Maintain calcium phosphorus product of < 55   - Stage 3 CKD - Goal Ca 8 5-10 mg/dL , goal Phos 2 7-4 6 mg/dL  , goal iPTH 30-70 pg/mL  - Patient is currently not at goal   - Continue patient on calcium plus D  - Patients' most recent vitamin D level is 19 1 as of May 2019  - check intact PTH vitamin-D levels after the visit and prior to next visit    Proteinuria:  - proteinuria most likely secondary to diabetic kidney disease plus obesity related FSGS  - most recent microalbumin to creatinine ratio as of March 2015 of 15 mg/dL  - check protein creatinine ratio  - currently on therapy for proteinuria with     Lipids:  - on Crestor, Niaspan  - goal LDL less than 70  - Management as per PCP    DM:  - management as per Primary team  - on insulin  - most recent A1c of 6 5% as of May 2020    PAD/acute on chronic CHF/CAD status post 2 stents:  - Management as per primary team  - most recent echo from March 2020 with EF of 55-60%  - at this time discontinued candesartan/HCTZ and Lasix  Started on torsemide 20 mg p o  Q day   - advised patient to follow-up with cardiology    GERD:  - on Protonix  - advised patient if possible to change over to Pepcid or Zantac due to possible association of long-term PPI use with CKD  Nutrition/obesity:  - Encouraged patient to follow a renal diet comprising of moderate potassium, low phosphorus and protein restriction to 0 8gm/kg  Advise of dietary habits and weight loss  - Will check serum albumin with next blood work  Followup:  - Patient is to follow-up in 3 months, with lab work to be performed in 10 days and then again in a few days prior to the next visit  Advised patient to call me in 10 days to review the results if they do not hear back from me, as I may have not received the results  Hayde Hooper MD, [facility], 7/20/2020, 12:46 PM             HISTORY OF PRESENT ILLNESS: 78 y o  female with past medical history of PAD s/p bilateral lower extremity angioplasty, CAD s/p 2 stents,  emphysema, hypertension (since 2007), CHF with EF of 55-60%, hypothyroidism, hypercholesterolemia, obesity, AFib and CKD stage 3 presents to the office for evaluation and management of abnormal renal parameters    Review of records shows that patient has a baseline creatinine of 0  9-1 2 mg/dL dating as far back as 2016  Records reveal patient has been on Mobic for many years  Status post recent hospitalization [facility] for bilateral pneumonia  Did have mild Dimitri at that time however creatinine was stable and improved prior to discharge  No h/o DIMITRI needing rrt or kidney stones  Recently due to increasing lower extremity edema her Lasix dose was increased subsequent to that she had blood work done on 07/14/2020 which showed elevated creatinine 1 5  Patient reports that after Lasix was resume back to normal dosing her edema recurred  Has not been checking blood pressures at home however does have capability to do so  Presented to the visit with her daughter  Very thankful unhappy for all the information that she has gotten today  Review of Systems   Constitutional: Positive for fatigue  Negative for appetite change, chills and fever  HENT: Negative for congestion, postnasal drip, rhinorrhea and sore throat  Respiratory: Positive for shortness of breath  Negative for cough and wheezing  Cardiovascular: Positive for leg swelling  Negative for chest pain  Gastrointestinal: Negative for abdominal pain, constipation, diarrhea, nausea and vomiting  Genitourinary: Negative for difficulty urinating, dysuria and hematuria  Musculoskeletal: Negative for back pain  Skin: Negative for rash and wound  Neurological: Negative for dizziness, light-headedness and headaches  Psychiatric/Behavioral: Negative for agitation and confusion  All other systems reviewed and are negative        PAST MEDICAL HISTORY:  Past Medical History:   Diagnosis Date    Coronary artery disease     Hyperlipidemia     Hypertension     [facility]        PROBLEM LIST    Patient Active Problem List   Diagnosis    Diabetes 1 5, managed as type 2 ([facility] )    Anxiety    Bilateral pneumonia    Centrilobular emphysema ([facility] )    [facility]    [facility] obstruction [facility] )    Coronary atherosclerosis    GERD without esophagitis    Essential hypertension    Hypercholesterolemia    Hypothyroidism    Lumbar radiculopathy    Obesity (BMI 30 0-34  9)    Osteopenia    [facility] ([facility])    Tobacco abuse    Unspecified atrial fibrillation ([facility])    CKD stage G3a/A1, GFR 45-59 and albumin creatinine ratio <30 mg/g ([facility])    TIFFANIE (acute kidney injury) ([facility] )       PAST SURGICAL HISTORY:  Past Surgical History:   Procedure Laterality Date    CARDIAC SURGERY      HYSTERECTOMY      age 32   [facility] OOPHORECTOMY Right     age 32    SMALL INTESTINE SURGERY      6 inches    TONSILLECTOMY      US GUIDED BREAST BIOPSY RIGHT COMPLETE Right 4/24/2019       SOCIAL HISTORY :   reports that she quit smoking about 16 months ago  She has never used smokeless tobacco  She reports that she does not drink alcohol or use drugs  FAMILY HISTORY:  Family History   Problem Relation Age of Onset    Hypertension Father         essential     Heart disease Father     Kidney disease Mother     Diabetes Mother        ALLERGIES:  No Known Allergies        PHYSICAL EXAM:  Vitals:    07/20/20 1128 07/20/20 1158   BP: 102/52 102/62   BP Location: Left arm    Patient Position: Sitting    Cuff Size: Large    Pulse: 76    Resp: 15      There is no height or weight on file to calculate BMI  Physical Exam   Constitutional: She is oriented to person, place, and time  She appears well-developed and well-nourished  HENT:   Head: Normocephalic and atraumatic  Mouth/Throat: Oropharynx is clear and moist  No oropharyngeal exudate  Eyes: Conjunctivae are normal  No scleral icterus  Neck: Normal range of motion  Neck supple  No JVD present  Cardiovascular: Normal heart sounds  Exam reveals no friction rub  Pulmonary/Chest: Effort normal  She has no wheezes  She has no rales  Decreased breath sounds bilaterally   Abdominal: Soft  She exhibits no mass   There is no tenderness  Musculoskeletal: She exhibits edema  Plus one edema bilateral lower extremities   Neurological: She is alert and oriented to person, place, and time  Skin: Skin is warm  No rash noted  Psychiatric: She has a normal mood and affect  Her behavior is normal    Vitals reviewed          LABORATORY DATA:     Results from last 6 Months   Lab Units 05/19/20  0910   POTASSIUM mmol/L 4 0   CHLORIDE mmol/L 104   CO2 mmol/L 28   BUN mg/dL 30*   CREATININE mg/dL 1 26   CALCIUM mg/dL 9 2        rest all reviewed    RADIOLOGY:  No orders to display     Rest all reviewed        MEDICATIONS:    Current Outpatient Medications:     acetaminophen (TYLENOL) 500 mg tablet, Take 500 mg by mouth every 6 (six) hours, Disp: , Rfl:     albuterol (2 5 mg/3 mL) 0 083 % nebulizer solution, inhale contents of 1 vial in nebulizer every 6 hours if needed, Disp: 300 mL, Rfl: 3    ALPRAZolam (XANAX) 0 25 mg tablet, 1bid prn anxiety, Disp: 180 tablet, Rfl: 0    aspirin (ELISE LOW DOSE) 81 mg EC tablet, Take by mouth, Disp: , Rfl:     budesonide-formoterol (SYMBICORT) 160-4 5 mcg/act inhaler, Inhale 2 puffs 2 (two) times a day Rinse mouth after use , Disp: 6 g, Rfl: 3    Calcium Carbonate-Vitamin D 600-200 MG-UNIT CAPS, Take 1 tablet by mouth 2 (two) times a day, Disp: , Rfl:     clopidogrel (PLAVIX) 75 mg tablet, , Disp: , Rfl: 0    fluticasone (FLONASE) 50 mcg/act nasal spray, 1 spray into each nostril daily, Disp: 16 g, Rfl: 3    HYDROcodone-acetaminophen (NORCO) 5-325 mg per tablet, Take 1 tablet by mouth every 6 (six) hours as needed for painMax Daily Amount: 4 tablets, Disp: 30 tablet, Rfl: 0    levothyroxine 125 mcg tablet, Take 1 tablet (125 mcg total) by mouth daily in the early morning, Disp: 30 tablet, Rfl: 5    niacin (NIASPAN) 1000 MG CR tablet, Take 1,000 mg by mouth, Disp: , Rfl:     pantoprazole (PROTONIX) 40 mg tablet, Take 1 tablet (40 mg total) by mouth 2 (two) times a day, Disp: 180 tablet, Rfl: 3   rosuvastatin (CRESTOR) 40 MG tablet, Take 40 mg by mouth daily, Disp: , Rfl:     tiotropium (SPIRIVA HANDIHALER) 18 mcg inhalation capsule, Place 1 capsule into inhaler and inhale daily, Disp: , Rfl:     zolpidem (AMBIEN CR) 6 25 MG CR tablet, Take 1 tablet (6 25 mg total) by mouth daily at bedtime as needed for sleep, Disp: 30 tablet, Rfl: 0    carvedilol (COREG) 12 5 mg tablet, Take 1 tablet (12 5 mg total) by mouth 2 (two) times a day with meals, Disp: 60 tablet, Rfl: 4    torsemide (DEMADEX) 20 mg tablet, Take 1 tablet (20 mg total) by mouth daily, Disp: 30 tablet, Rfl: 4        Portions of the record may have been created with voice recognition software  Occasional wrong word or "sound a like" substitutions may have occurred due to the inherent limitations of voice recognition software  Read the chart carefully and recognize, using context, where substitutions have occurred  If you have any questions, please contact the dictating provider

## 2020-07-20 NOTE — PATIENT INSTRUCTIONS
- stop norvasc  - stop candesartan/ hctz   - stop mobic  - start torsemide 20mg once a day  - change coreg to 12 5mg twice  - get blood work in 10 days and call me  - check blood pressure twice a day and call me with updates in 10days  - if blood pressure consistently over 150 then take norvasc 5mg once a day  - be seen by cardiology    - Please call me in 10 days after having your blood work done to review the results if you do not hear back from me or my office, as I may have not received the results  - please remember to perform blood work prior to the next visit  - Please call if the blood pressure top number is greater than 150 or less than 110 consistently  - Please call if you are gaining more than 2lbs in 2 days for adjustment of water pills   ~ Please AVOID the following pain medications  LIST OF NSAIDS (NONSTEROIDAL ANTI-INFLAMMATORY DRUGS) AND VERGARA-2 INHIBITORS    DIFLUNISAL (DOLOBID)  IBUPROFEN (MOTRIN, ADVIL)  FLURBIPROFEN (ANSAID)  KETOPROFEN (ORUDIS, ORUVAIL)  FENOPROFEN (NALFON)  NABUMETONE (RELAFEN)  PIROXICAM (FELDENE)  NAPROXEN (ALEVE, NAPROSYN, NAPRELAN, ANAPROX)  DICLOFENAC (VOLTAREN, CATAFLAM)  INDOMETHACIN (INDOCIN)  SULINDAC (CLINORIL)  TOLMETIN (TOLETIN)  ETODOLAC (LODINE)  MELOXICAM (MOBIC)  KETOROLAC (TORADOL)  OXAPROZIN (DAYPRO)  CELECOXIB (CELEBREX)    Phosphorus diet  Follow a low phosphorus diet      Avoid these higher phosphorus foods: Choose these lower phosphorus foods:   Milk, pudding or yogurt (from animals and from many soy varieties) Rice milk (unfortified), nondairy creamer (if it doesn't have terms in the ingredients list that contain the letters "phos")   Hard cheeses, ricotta or cottage cheese, fat-free cream cheese Regular and low-fat cream cheese   Ice cream or frozen yogurt Sherbet or frozen fruit pops   Soups made with higher phosphorus ingredients (milk, dried peas, beans, lentils) Soups made with lower phosphorus ingredients (broth- or water-based with other lower phosphorus ingredients)   Whole grains, including whole-grain breads, crackers, cereal, rice and pasta Refined grains, including white bread, crackers, cereals, rice and pasta   Quick breads, biscuits, cornbread, muffins, pancakes or waffles Homemade refined (white) dinner rolls, bagels or English muffins   Dried peas (split, black-eyed), beans (black, garbanzo, lima, kidney, navy, mackenzie) or lentils Green peas (canned, frozen), green beans or wax beans   Organ meats, walleye, pollock or sardines Lean beef, pork, lamb, poultry or other fish   Nuts and seeds Popcorn   Peanut butter and other nut butters Jam, jelly or honey   Chocolate, including chocolate drinks Carob (chocolate-flavored) candy, hard candy or gumdrops   Yusra and pepper-type sodas, flavored gray, bottled teas (if a term in the ingredients list contains the letters "phos") Lemon-lime soda, ginger ale or root beer, plain water     Follow a moderate potassium diet

## 2020-07-21 ENCOUNTER — TELEPHONE (OUTPATIENT)
Dept: NEPHROLOGY | Facility: CLINIC | Age: 80
End: 2020-07-21

## 2020-07-21 NOTE — TELEPHONE ENCOUNTER
Tried calling patient's daughter Mika Lazo however no place to leave a voice message    Left a voice message with patient's phone number informing then renal ultrasound is stable no hydronephrosis no cysts no masses everything is normal on ultrasound if they have any other questions please let me know thanks

## 2020-07-21 NOTE — TELEPHONE ENCOUNTER
Pt's daughter Mai Melendez wants to know if you can call her to discuss the [address] that was finalized today  The PCP already reached out but the daughter says she still has some questions for you regarding the [place]

## 2020-07-29 ENCOUNTER — OFFICE VISIT (OUTPATIENT)
Dept: FAMILY MEDICINE CLINIC | Facility: CLINIC | Age: 80
End: 2020-07-29
Payer: COMMERCIAL

## 2020-07-29 VITALS
BODY MASS INDEX: 33.46 KG/M2 | WEIGHT: 196 LBS | SYSTOLIC BLOOD PRESSURE: 124 MMHG | DIASTOLIC BLOOD PRESSURE: 80 MMHG | HEIGHT: 64 IN | TEMPERATURE: 98.3 F | HEART RATE: 72 BPM

## 2020-07-29 DIAGNOSIS — I10 ESSENTIAL HYPERTENSION: ICD-10-CM

## 2020-07-29 DIAGNOSIS — E78.00 HYPERCHOLESTEROLEMIA: ICD-10-CM

## 2020-07-29 DIAGNOSIS — I25.10 ATHEROSCLEROSIS OF CORONARY ARTERY OF NATIVE HEART WITHOUT ANGINA PECTORIS, UNSPECIFIED VESSEL OR LESION TYPE: ICD-10-CM

## 2020-07-29 DIAGNOSIS — E13.9 DIABETES 1.5, MANAGED AS TYPE 2 (HCC): ICD-10-CM

## 2020-07-29 DIAGNOSIS — J44.9 CHRONIC OBSTRUCTIVE PULMONARY DISEASE, UNSPECIFIED COPD TYPE (HCC): ICD-10-CM

## 2020-07-29 DIAGNOSIS — I50.33 ACUTE ON CHRONIC DIASTOLIC CONGESTIVE HEART FAILURE (HCC): ICD-10-CM

## 2020-07-29 DIAGNOSIS — I48.11 LONGSTANDING PERSISTENT ATRIAL FIBRILLATION (HCC): ICD-10-CM

## 2020-07-29 DIAGNOSIS — F32.9 REACTIVE DEPRESSION: Primary | ICD-10-CM

## 2020-07-29 PROCEDURE — 99214 OFFICE O/P EST MOD 30 MIN: CPT | Performed by: FAMILY MEDICINE

## 2020-07-29 PROCEDURE — 1036F TOBACCO NON-USER: CPT | Performed by: FAMILY MEDICINE

## 2020-07-29 PROCEDURE — 4040F PNEUMOC VAC/ADMIN/RCVD: CPT | Performed by: FAMILY MEDICINE

## 2020-07-29 PROCEDURE — 3074F SYST BP LT 130 MM HG: CPT | Performed by: FAMILY MEDICINE

## 2020-07-29 PROCEDURE — 3066F NEPHROPATHY DOC TX: CPT | Performed by: FAMILY MEDICINE

## 2020-07-29 PROCEDURE — 1160F RVW MEDS BY RX/DR IN RCRD: CPT | Performed by: FAMILY MEDICINE

## 2020-07-29 PROCEDURE — 3079F DIAST BP 80-89 MM HG: CPT | Performed by: FAMILY MEDICINE

## 2020-07-29 PROCEDURE — 3044F HG A1C LEVEL LT 7.0%: CPT | Performed by: FAMILY MEDICINE

## 2020-07-29 RX ORDER — ESCITALOPRAM OXALATE 10 MG/1
10 TABLET ORAL DAILY
Qty: 30 TABLET | Refills: 5 | Status: SHIPPED | OUTPATIENT
Start: 2020-07-29 | End: 2021-01-19 | Stop reason: SDUPTHER

## 2020-07-29 NOTE — PROGRESS NOTES
Falls Plan of Care: Balance, strength, and gait training instructions were provided  Patient ID: Davon Welch is a 78 y o  female  HPI: 78 y  o female presents for follow up of niddm, hypertension, hypercholesterolemia, copd and cad  Her weight is stable and she denies any edema or worsening dyspnea  All are well controlled  She denies any chest pain, dyspnea at rest    She denies any palpitaitons  She admits to anhedonia, mood swings , dysphoria, and depression, but denies any suicidiality  SUBJECTIVE    Family History   Problem Relation Age of Onset    Hypertension Father         essential     Heart disease Father     Kidney disease Mother     Diabetes Mother      Social History     Socioeconomic History    Marital status:       Spouse name: Not on file    Number of children: 3    Years of education: less than high school    Highest education level: Not on file   Occupational History    Not on file   Social Needs    Financial resource strain: Not on file    Food insecurity     Worry: Not on file     Inability: Not on file    Transportation needs     Medical: Not on file     Non-medical: Not on file   Tobacco Use    Smoking status: Former Smoker     Last attempt to quit: 3/1/2019     Years since quittin 4    Smokeless tobacco: Never Used   Substance and Sexual Activity    Alcohol use: No    Drug use: Never    Sexual activity: Not on file   Lifestyle    Physical activity     Days per week: Not on file     Minutes per session: Not on file    Stress: Not on file   Relationships    Social connections     Talks on phone: Not on file     Gets together: Not on file     Attends Muslim service: Not on file     Active member of club or organization: Not on file     Attends meetings of clubs or organizations: Not on file     Relationship status: Not on file    Intimate partner violence     Fear of current or ex partner: Not on file     Emotionally abused: Not on file Physically abused: Not on file     Forced sexual activity: Not on file   Other Topics Concern    Not on file   Social History Narrative    Daily coffee consumption:    Daily cola consumption:     Tea    Water intake, adequate (per day)     Past Medical History:   Diagnosis Date    Coronary artery disease     Hyperlipidemia     Hypertension     Peripheral vascular disease (HCC)      Past Surgical History:   Procedure Laterality Date    CARDIAC SURGERY      HYSTERECTOMY      age 32   [employer] OOPHORECTOMY Right     age 32   [employer] SMALL INTESTINE SURGERY      6 inches    TONSILLECTOMY      US GUIDED BREAST BIOPSY RIGHT COMPLETE Right 4/24/2019     No Known Allergies    Current Outpatient Medications:     acetaminophen (TYLENOL) 500 mg tablet, Take 500 mg by mouth every 6 (six) hours, Disp: , Rfl:     albuterol (2 5 mg/3 mL) 0 083 % nebulizer solution, inhale contents of 1 vial in nebulizer every 6 hours if needed, Disp: 300 mL, Rfl: 3    ALPRAZolam (XANAX) 0 25 mg tablet, 1bid prn anxiety, Disp: 180 tablet, Rfl: 0    aspirin (ELISE LOW DOSE) 81 mg EC tablet, Take by mouth, Disp: , Rfl:     budesonide-formoterol (SYMBICORT) 160-4 5 mcg/act inhaler, Inhale 2 puffs 2 (two) times a day Rinse mouth after use , Disp: 6 g, Rfl: 3    Calcium Carbonate-Vitamin D 600-200 MG-UNIT CAPS, Take 1 tablet by mouth 2 (two) times a day, Disp: , Rfl:     carvedilol (COREG) 12 5 mg tablet, Take 1 tablet (12 5 mg total) by mouth 2 (two) times a day with meals, Disp: 60 tablet, Rfl: 4    clopidogrel (PLAVIX) 75 mg tablet, , Disp: , Rfl: 0    fluticasone (FLONASE) 50 mcg/act nasal spray, 1 spray into each nostril daily, Disp: 16 g, Rfl: 3    HYDROcodone-acetaminophen (NORCO) 5-325 mg per tablet, Take 1 tablet by mouth every 6 (six) hours as needed for painMax Daily Amount: 4 tablets, Disp: 30 tablet, Rfl: 0    levothyroxine 125 mcg tablet, Take 1 tablet (125 mcg total) by mouth daily in the early morning, Disp: 30 tablet, Rfl: 5   niacin (NIASPAN) 1000 MG CR tablet, Take 1,000 mg by mouth, Disp: , Rfl:     pantoprazole (PROTONIX) 40 mg tablet, Take 1 tablet (40 mg total) by mouth 2 (two) times a day, Disp: 180 tablet, Rfl: 3    rosuvastatin (CRESTOR) 40 MG tablet, Take 40 mg by mouth daily, Disp: , Rfl:     tiotropium (SPIRIVA HANDIHALER) 18 mcg inhalation capsule, Place 1 capsule into inhaler and inhale daily, Disp: , Rfl:     torsemide (DEMADEX) 20 mg tablet, Take 1 tablet (20 mg total) by mouth daily, Disp: 30 tablet, Rfl: 4    zolpidem (AMBIEN CR) 6 25 MG CR tablet, Take 1 tablet (6 25 mg total) by mouth daily at bedtime as needed for sleep, Disp: 30 tablet, Rfl: 0    escitalopram (LEXAPRO) 10 mg tablet, Take 1 tablet (10 mg total) by mouth daily, Disp: 30 tablet, Rfl: 5    Review of Systems  Constitutional:     Denies fever, chills ,fatigue ,weakness ,weight loss, weight gain     ENT: Denies earache ,loss of hearing ,nosebleed, nasal discharge,nasal congestion ,sore throat ,hoarseness  Pulmonary: Denies shortness of breath ,cough  +dyspnea on exertion,no orthopnea  ,no PND   Cardiovascular:  Denies bradycardia , tachycardia  ,palpations, lower extremity edema leg, claudication  Breast:  Denies new or changing breast lumps ,nipple discharge ,nipple changes  Abdomen:  Denies abdominal pain , anorexia , indigestion, nausea, vomiting, constipation, diarrhea  Musculoskeletal: Denies myalgias, arthralgias, joint swelling, joint stiffness , limb pain, limb swelling  Gu: denies dysuria, polyuria  Skin: Denies skin rash, skin lesion, skin wound, itching, dry skin  Neuro: Denies headache, numbness, tingling, confusion, loss of consciousness, dizziness, vertigo  Psychiatric: + feelings of depression,+ anhedonia + mood swings, she denies any  suicidal ideation, anxiety, or sleep disturbances    OBJECTIVE  /80   Pulse 72   Temp 98 3 °F (36 8 °C)   Ht 5' 4"   Wt 88 9 kg (196 lb)   BMI 33 64 kg/m²   Constitutional:   NAD, well appearing and well nourished      ENT:   Conjunctiva and lids: no injection, edema, or discharge     Pupils and iris: MILADY bilaterally    External inspection of ears and nose: normal without deformities or discharge  Otoscopic exam: Canals patent without erythema  Nasal mucosa, septum and turbinates: Normal or edema or discharge         Oropharynx:  Moist mucosa, normal tongue and tonsils without lesions  No erythema        Pulmonary:Respiratory effort normal rate and rhythm, no increased work of breathing  Auscultation of lungs:  Clear bilaterally with decreased   breath sounds       Cardiovascular: regular rate and rhythm, S1 and S2, no murmur, no edema and/or varicosities of LE      Abdomen: Soft and non-distended     Positive bowel sounds      No heptomegaly or splenomegaly      Gu: no suprapubic tenderness or CVA tenderness, no urethral discharge  Lymphatic:  No anterior or posterior cervical lymphadenopathy         Musculoskeletal:  Gait and station: Normal gait      Digits and nails normal without clubbing or cyanosis       Inspection/palpation of joints, bones, and muscles:  No joint tenderness, swelling, full active and passive range of motion       Skin: Normal skin turgor and no rashes      Neuro:      Normal reflexes     Psych:   alert and oriented to person, place and time     Dysphoric mood no sucidiality      Assessment/Plan:Diagnoses and all orders for this visit:    Reactive depression  -     escitalopram (LEXAPRO) 10 mg tablet;  Take 1 tablet (10 mg total) by mouth daily    Diabetes 1 5, managed as type 2 (HCC)  -     Microalbumin / creatinine urine ratio    Longstanding persistent atrial fibrillation (HCC)    Chronic obstructive pulmonary disease, unspecified COPD type (HCC)    Acute on chronic diastolic congestive heart failure (HCC)    Atherosclerosis of coronary artery of native heart without angina pectoris, unspecified vessel or lesion type    Hypercholesterolemia    Essential hypertension        Will reevaluate patient;s depression in one month's time  She is to call if symptoms worsen

## 2020-07-30 LAB
CREAT ?TM UR-SCNC: 71.7 UMOL/L
EXT PROTEIN URINE: 57.3
PROT/CREAT UR: 0.8 MG/G{CREAT}

## 2020-08-04 ENCOUNTER — TELEPHONE (OUTPATIENT)
Dept: OTHER | Facility: HOSPITAL | Age: 80
End: 2020-08-04

## 2020-08-04 NOTE — TELEPHONE ENCOUNTER
Called and spoke to patient's daughter advised her of patient's most recent blood work from 07/30/2020 creatinine stable back to baseline of 0 9 mg/dL  Electrolytes stable over workup for paraproteinemia negative  Advised patient start vitamin-D over-the-counter 2000 units p o  Q day  Daughter reports SBP has been stable in the 120s to 130s at home  Not needing to take the Norvasc  No issues with edema  Advised patient to follow up back at her next appointment with blood work prior to the visit and to call in the interim with any questions or concerns

## 2020-08-13 DIAGNOSIS — G47.00 INSOMNIA, UNSPECIFIED TYPE: ICD-10-CM

## 2020-08-13 DIAGNOSIS — E03.9 HYPOTHYROIDISM, UNSPECIFIED TYPE: ICD-10-CM

## 2020-08-13 RX ORDER — LEVOTHYROXINE SODIUM 0.12 MG/1
125 TABLET ORAL
Qty: 30 TABLET | Refills: 5 | Status: SHIPPED | OUTPATIENT
Start: 2020-08-13 | End: 2021-03-01 | Stop reason: SDUPTHER

## 2020-08-13 RX ORDER — ZOLPIDEM TARTRATE 6.25 MG/1
6.25 TABLET, FILM COATED, EXTENDED RELEASE ORAL
Qty: 30 TABLET | Refills: 0 | Status: SHIPPED | OUTPATIENT
Start: 2020-08-13 | End: 2020-09-14 | Stop reason: SDUPTHER

## 2020-08-13 NOTE — TELEPHONE ENCOUNTER
Per PDMP last fill 05/26/20  Last OV 07/29/20   Next OV 12/09/20 05/26/2020 2 05/26/2020 ZOLPIDEM TART ER 6 25 MG TAB 30 0 30 [facility] [id number] [facility] ([id number]) 0 [place]

## 2020-08-24 DIAGNOSIS — F41.9 ANXIETY: ICD-10-CM

## 2020-08-24 RX ORDER — ALPRAZOLAM 0.25 MG/1
TABLET ORAL
Qty: 180 TABLET | Refills: 3 | Status: SHIPPED | OUTPATIENT
Start: 2020-08-24 | End: 2020-10-13 | Stop reason: SDUPTHER

## 2020-08-26 ENCOUNTER — TELEPHONE (OUTPATIENT)
Dept: FAMILY MEDICINE CLINIC | Facility: CLINIC | Age: 80
End: 2020-08-26

## 2020-09-14 DIAGNOSIS — G47.00 INSOMNIA, UNSPECIFIED TYPE: ICD-10-CM

## 2020-09-14 RX ORDER — ZOLPIDEM TARTRATE 6.25 MG/1
6.25 TABLET, FILM COATED, EXTENDED RELEASE ORAL
Qty: 30 TABLET | Refills: 0 | Status: SHIPPED | OUTPATIENT
Start: 2020-09-14 | End: 2020-10-14

## 2020-09-14 NOTE — TELEPHONE ENCOUNTER
Per PDMP last fill 08/13/20  Last OV 07/29/20, Next OV 12/09/20 08/13/2020 2 08/13/2020 ZOLPIDEM TART ER 6 25 MG TAB 30 0 30 [facility] [id number] [facility] ([id number]) 0 [place]

## 2020-09-24 DIAGNOSIS — K21.9 GASTROESOPHAGEAL REFLUX DISEASE WITHOUT ESOPHAGITIS: ICD-10-CM

## 2020-09-24 RX ORDER — PANTOPRAZOLE SODIUM 40 MG/1
TABLET, DELAYED RELEASE ORAL
Qty: 180 TABLET | Refills: 3 | Status: SHIPPED | OUTPATIENT
Start: 2020-09-24 | End: 2022-03-22 | Stop reason: SDUPTHER

## 2020-10-07 ENCOUNTER — TELEPHONE (OUTPATIENT)
Dept: FAMILY MEDICINE CLINIC | Facility: CLINIC | Age: 80
End: 2020-10-07

## 2020-10-07 ENCOUNTER — IMMUNIZATIONS (OUTPATIENT)
Dept: FAMILY MEDICINE CLINIC | Facility: CLINIC | Age: 80
End: 2020-10-07
Payer: COMMERCIAL

## 2020-10-07 DIAGNOSIS — Z23 ENCOUNTER FOR IMMUNIZATION: ICD-10-CM

## 2020-10-07 PROCEDURE — G0008 ADMIN INFLUENZA VIRUS VAC: HCPCS

## 2020-10-07 PROCEDURE — 90662 IIV NO PRSV INCREASED AG IM: CPT

## 2020-10-13 DIAGNOSIS — F41.9 ANXIETY: ICD-10-CM

## 2020-10-13 DIAGNOSIS — M54.16 LUMBAR RADICULOPATHY: ICD-10-CM

## 2020-10-13 RX ORDER — HYDROCODONE BITARTRATE AND ACETAMINOPHEN 5; 325 MG/1; MG/1
1 TABLET ORAL EVERY 6 HOURS PRN
Qty: 30 TABLET | Refills: 0 | Status: SHIPPED | OUTPATIENT
Start: 2020-10-13 | End: 2020-11-18 | Stop reason: SDUPTHER

## 2020-10-13 RX ORDER — ALPRAZOLAM 0.25 MG/1
TABLET ORAL
Qty: 180 TABLET | Refills: 3 | Status: SHIPPED | OUTPATIENT
Start: 2020-10-13 | End: 2021-07-14 | Stop reason: SDUPTHER

## 2020-10-14 DIAGNOSIS — G47.00 INSOMNIA, UNSPECIFIED TYPE: ICD-10-CM

## 2020-10-14 RX ORDER — ZOLPIDEM TARTRATE 6.25 MG/1
TABLET, FILM COATED, EXTENDED RELEASE ORAL
Qty: 30 TABLET | Refills: 3 | Status: SHIPPED | OUTPATIENT
Start: 2020-10-14 | End: 2021-02-11 | Stop reason: SDUPTHER

## 2020-10-28 ENCOUNTER — OFFICE VISIT (OUTPATIENT)
Dept: NEPHROLOGY | Facility: CLINIC | Age: 80
End: 2020-10-28
Payer: COMMERCIAL

## 2020-10-28 VITALS
SYSTOLIC BLOOD PRESSURE: 150 MMHG | TEMPERATURE: 98.8 F | BODY MASS INDEX: 33.12 KG/M2 | HEIGHT: 64 IN | RESPIRATION RATE: 18 BRPM | WEIGHT: 194 LBS | DIASTOLIC BLOOD PRESSURE: 80 MMHG | HEART RATE: 80 BPM

## 2020-10-28 DIAGNOSIS — N25.81 SECONDARY HYPERPARATHYROIDISM OF RENAL ORIGIN (HCC): ICD-10-CM

## 2020-10-28 DIAGNOSIS — E78.00 HYPERCHOLESTEROLEMIA: ICD-10-CM

## 2020-10-28 DIAGNOSIS — E66.9 OBESITY (BMI 30.0-34.9): ICD-10-CM

## 2020-10-28 DIAGNOSIS — I10 ESSENTIAL HYPERTENSION: ICD-10-CM

## 2020-10-28 DIAGNOSIS — E55.9 VITAMIN D DEFICIENCY: ICD-10-CM

## 2020-10-28 DIAGNOSIS — N18.31 CKD STAGE G3A/A1, GFR 45-59 AND ALBUMIN CREATININE RATIO <30 MG/G (HCC): Primary | ICD-10-CM

## 2020-10-28 PROBLEM — N17.9 AKI (ACUTE KIDNEY INJURY) (HCC): Status: RESOLVED | Noted: 2020-07-20 | Resolved: 2020-10-28

## 2020-10-28 PROCEDURE — 3077F SYST BP >= 140 MM HG: CPT | Performed by: INTERNAL MEDICINE

## 2020-10-28 PROCEDURE — 1160F RVW MEDS BY RX/DR IN RCRD: CPT | Performed by: INTERNAL MEDICINE

## 2020-10-28 PROCEDURE — 99214 OFFICE O/P EST MOD 30 MIN: CPT | Performed by: INTERNAL MEDICINE

## 2020-10-28 PROCEDURE — 3079F DIAST BP 80-89 MM HG: CPT | Performed by: INTERNAL MEDICINE

## 2020-10-28 RX ORDER — CANDESARTAN 8 MG/1
8 TABLET ORAL
Qty: 90 TABLET | Refills: 3 | Status: SHIPPED | OUTPATIENT
Start: 2020-10-28 | End: 2021-11-05 | Stop reason: SDUPTHER

## 2020-10-28 RX ORDER — ERGOCALCIFEROL 1.25 MG/1
50000 CAPSULE ORAL WEEKLY
Qty: 12 CAPSULE | Refills: 0 | Status: SHIPPED | OUTPATIENT
Start: 2020-10-28 | End: 2021-03-31

## 2020-11-16 ENCOUNTER — TELEPHONE (OUTPATIENT)
Dept: NEPHROLOGY | Facility: CLINIC | Age: 80
End: 2020-11-16

## 2020-11-18 DIAGNOSIS — M54.16 LUMBAR RADICULOPATHY: ICD-10-CM

## 2020-11-18 RX ORDER — HYDROCODONE BITARTRATE AND ACETAMINOPHEN 5; 325 MG/1; MG/1
1 TABLET ORAL EVERY 6 HOURS PRN
Qty: 30 TABLET | Refills: 0 | Status: SHIPPED | OUTPATIENT
Start: 2020-11-18 | End: 2021-02-11 | Stop reason: SDUPTHER

## 2020-12-01 PROBLEM — E13.22 OTHER SPECIFIED DIABETES MELLITUS WITH DIABETIC CHRONIC KIDNEY DISEASE (HCC): Status: ACTIVE | Noted: 2020-02-05

## 2020-12-01 PROBLEM — E13.51: Status: ACTIVE | Noted: 2020-12-01

## 2020-12-09 ENCOUNTER — OFFICE VISIT (OUTPATIENT)
Dept: FAMILY MEDICINE CLINIC | Facility: CLINIC | Age: 80
End: 2020-12-09
Payer: COMMERCIAL

## 2020-12-09 VITALS
WEIGHT: 180 LBS | SYSTOLIC BLOOD PRESSURE: 122 MMHG | DIASTOLIC BLOOD PRESSURE: 82 MMHG | HEIGHT: 64 IN | BODY MASS INDEX: 30.73 KG/M2 | HEART RATE: 69 BPM | TEMPERATURE: 98.3 F | OXYGEN SATURATION: 98 %

## 2020-12-09 DIAGNOSIS — M25.512 CHRONIC PAIN OF BOTH SHOULDERS: ICD-10-CM

## 2020-12-09 DIAGNOSIS — J44.9 CHRONIC OBSTRUCTIVE PULMONARY DISEASE, UNSPECIFIED COPD TYPE (HCC): ICD-10-CM

## 2020-12-09 DIAGNOSIS — E03.9 HYPOTHYROIDISM, UNSPECIFIED TYPE: ICD-10-CM

## 2020-12-09 DIAGNOSIS — E78.00 HYPERCHOLESTEROLEMIA: ICD-10-CM

## 2020-12-09 DIAGNOSIS — G89.29 CHRONIC PAIN OF BOTH SHOULDERS: ICD-10-CM

## 2020-12-09 DIAGNOSIS — M25.511 CHRONIC PAIN OF BOTH SHOULDERS: ICD-10-CM

## 2020-12-09 DIAGNOSIS — E11.42 TYPE 2 DIABETES MELLITUS WITH DIABETIC POLYNEUROPATHY, WITHOUT LONG-TERM CURRENT USE OF INSULIN (HCC): Primary | ICD-10-CM

## 2020-12-09 DIAGNOSIS — E55.9 VITAMIN D DEFICIENCY: ICD-10-CM

## 2020-12-09 PROBLEM — N18.31 CKD STAGE G3A/A1, GFR 45-59 AND ALBUMIN CREATININE RATIO <30 MG/G (HCC): Status: RESOLVED | Noted: 2020-07-20 | Resolved: 2020-12-09

## 2020-12-09 LAB — SL AMB POCT HEMOGLOBIN AIC: 6.4 (ref ?–6.5)

## 2020-12-09 PROCEDURE — 3074F SYST BP LT 130 MM HG: CPT | Performed by: FAMILY MEDICINE

## 2020-12-09 PROCEDURE — 83036 HEMOGLOBIN GLYCOSYLATED A1C: CPT | Performed by: FAMILY MEDICINE

## 2020-12-09 PROCEDURE — 3288F FALL RISK ASSESSMENT DOCD: CPT | Performed by: FAMILY MEDICINE

## 2020-12-09 PROCEDURE — 1036F TOBACCO NON-USER: CPT | Performed by: FAMILY MEDICINE

## 2020-12-09 PROCEDURE — 99214 OFFICE O/P EST MOD 30 MIN: CPT | Performed by: FAMILY MEDICINE

## 2020-12-09 PROCEDURE — 3079F DIAST BP 80-89 MM HG: CPT | Performed by: FAMILY MEDICINE

## 2020-12-09 PROCEDURE — 3725F SCREEN DEPRESSION PERFORMED: CPT | Performed by: FAMILY MEDICINE

## 2020-12-09 PROCEDURE — 1160F RVW MEDS BY RX/DR IN RCRD: CPT | Performed by: FAMILY MEDICINE

## 2020-12-09 PROCEDURE — T1015 CLINIC SERVICE: HCPCS | Performed by: FAMILY MEDICINE

## 2020-12-09 PROCEDURE — 1101F PT FALLS ASSESS-DOCD LE1/YR: CPT | Performed by: FAMILY MEDICINE

## 2020-12-28 DIAGNOSIS — N18.31 CKD STAGE G3A/A1, GFR 45-59 AND ALBUMIN CREATININE RATIO <30 MG/G (HCC): ICD-10-CM

## 2020-12-28 DIAGNOSIS — N17.9 AKI (ACUTE KIDNEY INJURY) (HCC): ICD-10-CM

## 2020-12-28 DIAGNOSIS — I10 ESSENTIAL HYPERTENSION: ICD-10-CM

## 2020-12-28 RX ORDER — CARVEDILOL 12.5 MG/1
12.5 TABLET ORAL 2 TIMES DAILY WITH MEALS
Qty: 180 TABLET | Refills: 3 | Status: SHIPPED | OUTPATIENT
Start: 2020-12-28 | End: 2022-01-05 | Stop reason: SDUPTHER

## 2020-12-28 RX ORDER — TORSEMIDE 20 MG/1
20 TABLET ORAL DAILY
Qty: 90 TABLET | Refills: 3 | Status: SHIPPED | OUTPATIENT
Start: 2020-12-28 | End: 2021-12-28 | Stop reason: SDUPTHER

## 2020-12-29 ENCOUNTER — TELEPHONE (OUTPATIENT)
Dept: NEPHROLOGY | Facility: CLINIC | Age: 80
End: 2020-12-29

## 2021-01-19 DIAGNOSIS — F32.9 REACTIVE DEPRESSION: ICD-10-CM

## 2021-01-19 RX ORDER — ESCITALOPRAM OXALATE 10 MG/1
10 TABLET ORAL DAILY
Qty: 30 TABLET | Refills: 5 | Status: SHIPPED | OUTPATIENT
Start: 2021-01-19 | End: 2021-07-26 | Stop reason: SDUPTHER

## 2021-02-11 DIAGNOSIS — M54.16 LUMBAR RADICULOPATHY: ICD-10-CM

## 2021-02-11 DIAGNOSIS — G47.00 INSOMNIA, UNSPECIFIED TYPE: ICD-10-CM

## 2021-02-11 RX ORDER — ZOLPIDEM TARTRATE 6.25 MG/1
6.25 TABLET, FILM COATED, EXTENDED RELEASE ORAL
Qty: 30 TABLET | Refills: 3 | Status: SHIPPED | OUTPATIENT
Start: 2021-02-11 | End: 2021-06-15 | Stop reason: SDUPTHER

## 2021-02-11 RX ORDER — HYDROCODONE BITARTRATE AND ACETAMINOPHEN 5; 325 MG/1; MG/1
1 TABLET ORAL EVERY 6 HOURS PRN
Qty: 30 TABLET | Refills: 0 | Status: SHIPPED | OUTPATIENT
Start: 2021-02-11 | End: 2021-03-15 | Stop reason: SDUPTHER

## 2021-02-11 NOTE — TELEPHONE ENCOUNTER
Per PDMP last fill 01/14/21  Last OV 12/09/20, Next OV 04/14/21 01/14/2021 1 10/14/2020   ZOLPIDEM TART ER 6 25 MG TAB  30 0 30 [facility] [id number] [facility] ([id number]) 3  [place]    12/14/2020 1 10/14/2020   ZOLPIDEM TART ER 6 25 MG TAB  30 0 30 [facility] [id number] [facility] ([id number]) 2  Comm [facility]    11/18/2020 1 11/18/2020   HYDROCODONE-ACETAMIN 5-325 MG  30 0 8 [facility] [id number] [facility] ([id number]) 0 18 75 [facility] [place]

## 2021-03-01 DIAGNOSIS — E03.9 HYPOTHYROIDISM, UNSPECIFIED TYPE: ICD-10-CM

## 2021-03-01 RX ORDER — LEVOTHYROXINE SODIUM 0.12 MG/1
125 TABLET ORAL
Qty: 30 TABLET | Refills: 0 | Status: SHIPPED | OUTPATIENT
Start: 2021-03-01 | End: 2021-03-31 | Stop reason: SDUPTHER

## 2021-03-15 ENCOUNTER — TELEPHONE (OUTPATIENT)
Dept: FAMILY MEDICINE CLINIC | Facility: CLINIC | Age: 81
End: 2021-03-15

## 2021-03-15 DIAGNOSIS — M15.9 OSTEOARTHRITIS OF MULTIPLE JOINTS, UNSPECIFIED OSTEOARTHRITIS TYPE: Primary | ICD-10-CM

## 2021-03-15 DIAGNOSIS — M54.16 LUMBAR RADICULOPATHY: ICD-10-CM

## 2021-03-15 RX ORDER — MELOXICAM 15 MG/1
15 TABLET ORAL DAILY
Qty: 30 TABLET | Refills: 5 | Status: SHIPPED | OUTPATIENT
Start: 2021-03-15 | End: 2021-04-14 | Stop reason: ALTCHOICE

## 2021-03-15 RX ORDER — HYDROCODONE BITARTRATE AND ACETAMINOPHEN 5; 325 MG/1; MG/1
1 TABLET ORAL EVERY 6 HOURS PRN
Qty: 30 TABLET | Refills: 0 | Status: SHIPPED | OUTPATIENT
Start: 2021-03-15 | End: 2021-04-14 | Stop reason: SDUPTHER

## 2021-03-15 NOTE — TELEPHONE ENCOUNTER
Patient wants to know if you will prescribe meloxicam 15 mg for her again  She had some at home and she has been taking it  It does seem to help with her inflammation

## 2021-03-15 NOTE — TELEPHONE ENCOUNTER
Patient also requesting a refill on meloxicam 15 mg  This is no longer on her med list, but she has it at home and she has been taking it and it is helping with her inflammation  She wants to know if she can have a new RX for this too       Per PDMp last fill hydrocodone 02/11/21   Last OV 12/09/20, Next OV 04/14/21 02/13/2021 1 02/11/2021   ZOLPIDEM TART ER 6 25 MG TAB  30 0 30 [facility]   [id number]  [facility] ([id number]) 0  [place]    02/11/2021 1 02/11/2021   HYDROCODONE-ACETAMIN 5-325 MG  30 0 7 [facility]   [id number]  [facility] ([id number]) 0 21 43 [facility] [place]    02/11/2021 1 10/13/2020   ALPRAZOLAM 0 25 MG TABLET  180 0 90 [facility]   [id number]  [facility] ([id number]) 0  [place]

## 2021-03-30 ENCOUNTER — TELEPHONE (OUTPATIENT)
Dept: FAMILY MEDICINE CLINIC | Facility: CLINIC | Age: 81
End: 2021-03-30

## 2021-03-31 ENCOUNTER — OFFICE VISIT (OUTPATIENT)
Dept: NEPHROLOGY | Facility: CLINIC | Age: 81
End: 2021-03-31
Payer: COMMERCIAL

## 2021-03-31 VITALS
DIASTOLIC BLOOD PRESSURE: 72 MMHG | WEIGHT: 178 LBS | HEIGHT: 64 IN | BODY MASS INDEX: 30.39 KG/M2 | SYSTOLIC BLOOD PRESSURE: 132 MMHG

## 2021-03-31 DIAGNOSIS — E03.9 HYPOTHYROIDISM, UNSPECIFIED TYPE: ICD-10-CM

## 2021-03-31 DIAGNOSIS — I13.0 HYPERTENSIVE HEART AND CHRONIC KIDNEY DISEASE WITH HEART FAILURE AND STAGE 1 THROUGH STAGE 4 CHRONIC KIDNEY DISEASE, OR CHRONIC KIDNEY DISEASE (HCC): Primary | ICD-10-CM

## 2021-03-31 DIAGNOSIS — E66.9 OBESITY (BMI 30.0-34.9): ICD-10-CM

## 2021-03-31 DIAGNOSIS — E55.9 VITAMIN D DEFICIENCY: ICD-10-CM

## 2021-03-31 DIAGNOSIS — N18.31 STAGE 3A CHRONIC KIDNEY DISEASE (HCC): ICD-10-CM

## 2021-03-31 DIAGNOSIS — E78.00 HYPERCHOLESTEROLEMIA: ICD-10-CM

## 2021-03-31 DIAGNOSIS — N25.81 SECONDARY HYPERPARATHYROIDISM OF RENAL ORIGIN (HCC): ICD-10-CM

## 2021-03-31 PROCEDURE — 1036F TOBACCO NON-USER: CPT | Performed by: INTERNAL MEDICINE

## 2021-03-31 PROCEDURE — 99214 OFFICE O/P EST MOD 30 MIN: CPT | Performed by: INTERNAL MEDICINE

## 2021-03-31 PROCEDURE — 1160F RVW MEDS BY RX/DR IN RCRD: CPT | Performed by: INTERNAL MEDICINE

## 2021-03-31 RX ORDER — CALCITRIOL 0.25 UG/1
0.25 CAPSULE, LIQUID FILLED ORAL 3 TIMES WEEKLY
Qty: 36 CAPSULE | Refills: 4 | Status: SHIPPED | OUTPATIENT
Start: 2021-03-31 | End: 2021-11-17

## 2021-03-31 RX ORDER — LEVOTHYROXINE SODIUM 0.12 MG/1
125 TABLET ORAL
Qty: 30 TABLET | Refills: 0 | Status: SHIPPED | OUTPATIENT
Start: 2021-03-31 | End: 2021-04-28 | Stop reason: SDUPTHER

## 2021-03-31 NOTE — PATIENT INSTRUCTIONS
- stop ergocalciferol  - start vit D over the counter 2000 units daily  - start calcitriol 0 25mcg three times a week    - Please call me in 10 days after having your blood work done to review the results if you do not hear back from me or my office, as I may have not received the results  - please remember to perform blood work prior to the next visit  - Please call if the blood pressure top number is greater than 150 or less than 110 consistently  - Please call if you are gaining more than 2lbs in 2 days for adjustment of water pills   ~ Please AVOID the following pain medications  LIST OF NSAIDS (NONSTEROIDAL ANTI-INFLAMMATORY DRUGS) AND VERGARA-2 INHIBITORS    DIFLUNISAL (DOLOBID)  IBUPROFEN (MOTRIN, ADVIL)  FLURBIPROFEN (ANSAID)  KETOPROFEN (ORUDIS, ORUVAIL)  FENOPROFEN (NALFON)  NABUMETONE (RELAFEN)  PIROXICAM (FELDENE)  NAPROXEN (ALEVE, NAPROSYN, NAPRELAN, ANAPROX)  DICLOFENAC (VOLTAREN, CATAFLAM)  INDOMETHACIN (INDOCIN)  SULINDAC (CLINORIL)  TOLMETIN (TOLETIN)  ETODOLAC (LODINE)  MELOXICAM (MOBIC)  KETOROLAC (TORADOL)  OXAPROZIN (DAYPRO)  CELECOXIB (CELEBREX)  Phosphorus diet  Follow a low phosphorus diet      Avoid these higher phosphorus foods: Choose these lower phosphorus foods:   Milk, pudding or yogurt (from animals and from many soy varieties) Rice milk (unfortified), nondairy creamer (if it doesn't have terms in the ingredients list that contain the letters "phos")   Hard cheeses, ricotta or cottage cheese, fat-free cream cheese Regular and low-fat cream cheese   Ice cream or frozen yogurt Sherbet or frozen fruit pops   Soups made with higher phosphorus ingredients (milk, dried peas, beans, lentils) Soups made with lower phosphorus ingredients (broth- or water-based with other lower phosphorus ingredients)   Whole grains, including whole-grain breads, crackers, cereal, rice and pasta Refined grains, including white bread, crackers, cereals, rice and pasta   Quick breads, biscuits, cornbread, muffins, pancakes or waffles Homemade refined (white) dinner rolls, bagels or English muffins   Dried peas (split, black-eyed), beans (black, garbanzo, lima, kidney, navy, mackenzie) or lentils Green peas (canned, frozen), green beans or wax beans   Organ meats, walleye, pollock or sardines Lean beef, pork, lamb, poultry or other fish   Nuts and seeds Popcorn   Peanut butter and other nut butters Jam, jelly or honey   Chocolate, including chocolate drinks Carob (chocolate-flavored) candy, hard candy or gumdrops   Yusra and pepper-type sodas, flavored gray, bottled teas (if a term in the ingredients list contains the letters "phos") Lemon-lime soda, ginger ale or root beer, plain water     Follow a moderate potassium diet  Things to do to reduce your blood pressure include working with all your physician to do the following:  ~ stop smoking if you smoke  ~ increase cardiovascular exercise like walking and swimming    ~ modify your diet to decrease fat and salt intake  ~ reduce your weight if you are overweight or obese   ~ increase the consumption of fruits, vegetables and whole grains  ~ decrease alcohol consumption if you consume alcohol    ~ try to minimize stress in your life with lifestyle modifications  ~ be compliant with your anti-hypertensive medications  ~ adjust your medications to help improve your vascular stiffness and decrease risks for heart attacks and strokes

## 2021-03-31 NOTE — PROGRESS NOTES
Nephrology Follow up Consultation  Per Pacheco [de-identified] y o  female MRN: [id number]            BACKGROUND:  Allyson Tang is a [de-identified] y  o female who was referred by Juliana Gross DO for evaluation of Chronic Kidney Disease, Follow-up, Hypertension, and Proteinuria    ASSESSMENT / PLAN:   [de-identified] y o   female with pmh of multiple co-morbidities including PAD s/p bilateral lower extremity angioplasty, CAD s/p 2 stents,  emphysema, diabetes diet controlled, hypertension (since 2007), CHF with EF of 55-60%, hypothyroidism, hypercholesterolemia, obesity, AFib and CKD stage 3 presents to the office for routine follow-up  CKD stage 3AA1:  - Patient has a baseline creatinine of 0 9-1 2 mg/dL dating as far back as 2016  Most recent labs show a Creatinine of 0 92 mg/dL as of 2/17/21  Renal function stable  At baseline     - likely has underlying CKD secondary to diabetic kidney disease plus hypertensive nephrosclerosis plus age-related nephron loss plus obesity related FSGS and NSAID nephropathy    - SPEP UPEP free light chain ratio from July 2020 within normal limits  - renal ultrasound from 07/16/2020 bilateral kidneys approximately 10 cm no hydronephrosis no calculi  - Acid base and lytes stable  - Clinically the patient appears to be euvolemic   - Recommend to avoid use of NSAIDs, nephrotoxins  Caution advised with regards to exposure to IV contrast dye    - Discussed with the patient in depth her renal status, including the possible etiologies for CKD  - Advised the patient that when her GFR is close to 20mL/min then will start discussing about RRT(renal replacement therapy) options such as renal transplant, peritoneal dialysis and hemodialysis  - Informed the patient about the various options for Renal Replacement therapy    - Discussed with the patient how we need to work together to delay the progression of CKD with optimal BP control based on their age and co-morbidities, optimal BS control with HbA1c of <7% and trying to reduce proteinuria by the use of anti-proteinuric agents  Hypertension:  - Patient is on coreg 12 5mg po bid, torsemide 20mg po Q24 , candesartan 8 mg p o  q h s   - Goal BP of <  140/90 based on age and comorbidities  - Instructed to follow low sodium (2gm)diet   - Advised to hold ACEI/ARBs if patient suffers from dehydration due to gastrointestinal losses due to risk of TIFFANIE secondary to failure to autoregulate  Hemoglobin:  - Goal Hb of 10-12 g/dL  - Most recent labs suggestive of 12 1 grams/deciliter  - no role for IV iron at this time    CKD-MBD(Mineral Bone Disease) / vitamin-D deficiency/secondary hyperparathyroidism of renal origin:  - Based on patients CKD stage following is the goal of therapy  - Maintain calcium phosphorus product of < 55   - Stage 3 CKD - Goal Ca 8 5-10 mg/dL , goal Phos 2 7-4 6 mg/dL  , goal iPTH 30-70 pg/mL  - Patient is currently not at goal   -  Continue over-the-counter vitamin-D 2000 units p o  Q day  start calcitriol 0 25 mcg p o  3 times a week  - Patients' most recent vitamin D level is 37 and intact PTH of 128 9 as of  02/17/2021  - check intact PTH vitamin-D levels after the visit and prior to next visit    Proteinuria:  - proteinuria most likely secondary to diabetic kidney disease plus obesity related FSGS  - most recent protein creatinine ratio of 800 mg as of July 2020  - check protein creatinine ratio  - currently on therapy for proteinuria with  vitamin-D, Crestor    Lipids:  - on Crestor, Niaspan  - goal LDL less than 70  - Management as per PCP    DM:  - management as per Primary team  - diet controlled  - most recent A1c of 6 4% as of 12/20    PAD/acute on chronic CHF/CAD status post 2 stents:  - Management as per primary team  - most recent echo from March 2020 with EF of 55-60%  - stable on torsemide 20 mg p o  Q day   candesartan 8 mg p o  Q h s   - advised patient to follow-up with cardiology    GERD:  - on Protonix  - advised patient if possible to change over to Pepcid or Zantac due to possible association of long-term PPI use with CKD  However unfortunately is not able to switch  Nutrition/obesity:  - Encouraged patient to follow a renal diet comprising of moderate potassium, low phosphorus and protein restriction to 0 8gm/kg  Advise of dietary habits and weight loss  - Will check serum albumin with next blood work  Followup:  - Patient is to follow-up in 4 months, with lab work to be performed in 2 weeks and then again in a few days prior to the next visit  Advised patient to call me in 10 days to review the results if they do not hear back from me, as I may have not received the results  Debora Shook MD, [facility], 3/31/2021, 9:30 AM             SUBJECTIVE: [de-identified] y o  female presents to the office for routine follow-up  Presents to the visit with her daughter overall feels well has no complaints  No recent hospitalization no issues with edema thankful for all the care information that she has gotten today happy to hear renal parameters are stable last use 1 pill of Mobic around last week  Understands that she needs to try to avoid using it  Review of Systems   Constitutional: Negative for appetite change, chills, fatigue and fever  HENT: Negative for congestion, postnasal drip, rhinorrhea and sore throat  Respiratory: Negative for cough, shortness of breath and wheezing  Cardiovascular: Negative for leg swelling  Gastrointestinal: Negative for abdominal pain, constipation, diarrhea, nausea and vomiting  Genitourinary: Negative for difficulty urinating, dysuria and hematuria  Musculoskeletal: Negative for back pain  Skin: Negative for rash and wound  Neurological: Negative for dizziness, light-headedness and headaches  Psychiatric/Behavioral: Negative for agitation and confusion  All other systems reviewed and are negative        PAST MEDICAL HISTORY:  Past Medical History:   Diagnosis Date    Coronary artery disease     Hyperlipidemia     Hypertension     Peripheral vascular disease ([facility] )        PROBLEM LIST    Patient Active Problem List   Diagnosis    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 ([place] [address] )    Anxiety    Bilateral pneumonia    Centrilobular emphysema ([place] [address] )    CHF (congestive heart failure) ([place] [address] )    Chronic airway obstruction (HCC)    Coronary atherosclerosis    GERD without esophagitis    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, or chronic kidney disease ([place] [address] )    Hypercholesterolemia    Hypothyroidism    Lumbar radiculopathy    Obesity (BMI 30 0-34  9)    Osteopenia    [facility]) [facility])    Tobacco abuse    Unspecified atrial fibrillation ([facility])    Stage 3a chronic kidney disease    Secondary hyperparathyroidism of renal origin ([place] [address] )    Vitamin D deficiency    Other specified diabetes mellitus with diabetic peripheral angiopathy without gangrene ([place] [address] )       PAST SURGICAL HISTORY:  Past Surgical History:   Procedure Laterality Date    CARDIAC SURGERY      HYSTERECTOMY      age 32   [facility] OOPHORECTOMY Right     age 32   [facility] SMALL INTESTINE SURGERY      6 inches    TONSILLECTOMY      US GUIDED BREAST BIOPSY RIGHT COMPLETE Right 4/24/2019       SOCIAL HISTORY :   reports that she quit smoking about 2 years ago  She has never used smokeless tobacco  She reports that she does not drink alcohol or use drugs      FAMILY HISTORY:  Family History   Problem Relation Age of Onset    Hypertension Father         essential     Heart disease Father     Kidney disease Mother     Diabetes Mother        ALLERGIES:  No Known Allergies        PHYSICAL EXAM:  Vitals:    03/31/21 0900 03/31/21 0912   BP: 148/70 132/72   BP Location: Left arm    Patient Position: Sitting    Cuff Size: Large    Weight: 80 7 kg (178 lb)    Height: 5' 4" (1 626 m)      Body mass index is 30 55 kg/m²  Physical Exam  Vitals signs reviewed  Constitutional:       General: She is not in acute distress  Appearance: Normal appearance  She is obese  She is not ill-appearing, toxic-appearing or diaphoretic  HENT:      Head: Normocephalic and atraumatic  Mouth/Throat:      Mouth: Mucous membranes are moist       Pharynx: Oropharynx is clear  No oropharyngeal exudate  Eyes:      General: No scleral icterus  Conjunctiva/sclera: Conjunctivae normal    Neck:      Musculoskeletal: Normal range of motion and neck supple  Cardiovascular:      Rate and Rhythm: Normal rate  Heart sounds: Normal heart sounds  No friction rub  Pulmonary:      Effort: Pulmonary effort is normal  No respiratory distress  Breath sounds: Normal breath sounds  No stridor  No wheezing  Abdominal:      General: There is no distension  Palpations: Abdomen is soft  There is no mass  Tenderness: There is no abdominal tenderness  There is no right CVA tenderness or left CVA tenderness  Musculoskeletal:         General: No swelling  Skin:     General: Skin is warm  Coloration: Skin is not jaundiced  Neurological:      General: No focal deficit present  Mental Status: She is alert and oriented to person, place, and time     Psychiatric:         Mood and Affect: Mood normal          Behavior: Behavior normal          LABORATORY DATA:           Invalid input(s): ALBUMIN, INTACTPTH, IRONSAT     rest all reviewed    RADIOLOGY:  No orders to display     Rest all reviewed        MEDICATIONS:    Current Outpatient Medications:     acetaminophen (TYLENOL) 500 mg tablet, Take 500 mg by mouth every 6 (six) hours, Disp: , Rfl:     albuterol (2 5 mg/3 mL) 0 083 % nebulizer solution, inhale contents of 1 vial in nebulizer every 6 hours if needed, Disp: 300 mL, Rfl: 3    ALPRAZolam (XANAX) 0 25 mg tablet, take 1 tablet by mouth twice a day if needed for anxiety, Disp: 180 tablet, Rfl: 3    aspirin (ELISE LOW DOSE) 81 mg EC tablet, Take by mouth, Disp: , Rfl:     budesonide-formoterol (SYMBICORT) 160-4 5 mcg/act inhaler, Inhale 2 puffs 2 (two) times a day Rinse mouth after use , Disp: 6 g, Rfl: 3    candesartan (ATACAND) 8 MG tablet, Take 1 tablet (8 mg total) by mouth daily at bedtime, Disp: 90 tablet, Rfl: 3    carvedilol (COREG) 12 5 mg tablet, Take 1 tablet (12 5 mg total) by mouth 2 (two) times a day with meals, Disp: 180 tablet, Rfl: 3    clopidogrel (PLAVIX) 75 mg tablet, Take 75 mg by mouth daily , Disp: , Rfl: 0    Diclofenac Sodium (VOLTAREN) 1 %, Apply 2 g topically 4 (four) times a day, Disp: 100 g, Rfl: 3    escitalopram (LEXAPRO) 10 mg tablet, Take 1 tablet (10 mg total) by mouth daily, Disp: 30 tablet, Rfl: 5    fluticasone (FLONASE) 50 mcg/act nasal spray, 1 spray into each nostril daily, Disp: 16 g, Rfl: 3    HYDROcodone-acetaminophen (NORCO) 5-325 mg per tablet, Take 1 tablet by mouth every 6 (six) hours as needed for painMax Daily Amount: 4 tablets, Disp: 30 tablet, Rfl: 0    levothyroxine 125 mcg tablet, Take 1 tablet (125 mcg total) by mouth daily in the early morning, Disp: 30 tablet, Rfl: 0    niacin (NIASPAN) 1000 MG CR tablet, Take 1,000 mg by mouth, Disp: , Rfl:     pantoprazole (PROTONIX) 40 mg tablet, TAKE 1 TABLET BY MOUTH 2 TIMES DAILY (Patient taking differently: Take 40 mg by mouth daily ), Disp: 180 tablet, Rfl: 3    rosuvastatin (CRESTOR) 40 MG tablet, Take 40 mg by mouth daily, Disp: , Rfl:     tiotropium (SPIRIVA HANDIHALER) 18 mcg inhalation capsule, Place 1 capsule into inhaler and inhale daily, Disp: , Rfl:     torsemide (DEMADEX) 20 mg tablet, Take 1 tablet (20 mg total) by mouth daily, Disp: 90 tablet, Rfl: 3    zolpidem (AMBIEN CR) 6 25 MG CR tablet, Take 1 tablet (6 25 mg total) by mouth daily at bedtime as needed for sleep (Patient taking differently: Take 6 25 mg by mouth daily at bedtime ), Disp: 30 tablet, Rfl: 3    calcitriol (ROCALTROL) 0 25 mcg capsule, Take 1 capsule (0 25 mcg total) by mouth 3 (three) times a week, Disp: 36 capsule, Rfl: 4    meloxicam (MOBIC) 15 mg tablet, Take 1 tablet (15 mg total) by mouth daily (Patient not taking: Reported on 3/31/2021), Disp: 30 tablet, Rfl: 5          Portions of the record may have been created with voice recognition software  Occasional wrong word or "sound a like" substitutions may have occurred due to the inherent limitations of voice recognition software  Read the chart carefully and recognize, using context, where substitutions have occurred  If you have any questions, please contact the dictating provider

## 2021-04-07 ENCOUNTER — TELEPHONE (OUTPATIENT)
Dept: FAMILY MEDICINE CLINIC | Facility: CLINIC | Age: 81
End: 2021-04-07

## 2021-04-07 DIAGNOSIS — J44.9 CHRONIC OBSTRUCTIVE PULMONARY DISEASE, UNSPECIFIED COPD TYPE (HCC): ICD-10-CM

## 2021-04-07 RX ORDER — TIOTROPIUM BROMIDE 18 UG/1
18 CAPSULE ORAL; RESPIRATORY (INHALATION) DAILY
Qty: 1 EACH | Refills: 3 | Status: SHIPPED | OUTPATIENT
Start: 2021-04-07 | End: 2022-05-23 | Stop reason: SDUPTHER

## 2021-04-07 RX ORDER — BUDESONIDE AND FORMOTEROL FUMARATE DIHYDRATE 160; 4.5 UG/1; UG/1
2 AEROSOL RESPIRATORY (INHALATION) 2 TIMES DAILY
Qty: 6 G | Refills: 3
Start: 2021-04-07

## 2021-04-07 NOTE — TELEPHONE ENCOUNTER
Patient called  She usually gets samples of Symbicort and Spiriva, but we didn't have any last time she called  She contacted her insurance and each inhaler has a $40 co pay   She wants to know if either of them have a generic equivalent that you could substitute

## 2021-04-07 NOTE — PROGRESS NOTES
Assessment & Plan:     E13 22  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    E13 51  Other specified diabetes mellitus with diabetic peripheral angiopathy without gangrene    N18 31  Chronic kidney disease stage 3a    K21 9  Gastroesophageal reflux disease without esophagitis    E03 9  Hypothyroidism    N25 81  Secondary hyperparathyroidism of renal origin ([facility])     I13 0  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, or chronic kidney disease ([facility])    E78 00  Hypercholesterolemia     E55 9  Vitamin D deficiency     J44 9  Chronic obstructive pulmonary disease, unspecified type     B80 37  Chronic diastolic (congestive) heart failure    I73 9  PAD (peripheral artery disease) ([facility])     E66 9  Obesity     I25 10  Atherosclerosis of coronary artery of native heart without angina pectoris, unspecified vessel or lesion type          Subjective:     Patient ID: Lynsey Barker is a [de-identified] y o  female     No chief complaint on file  HPI    Review of Systems    Objective: There were no vitals taken for this visit      Problem List Items Addressed This Visit     None          Physical Exam

## 2021-04-14 ENCOUNTER — TELEPHONE (OUTPATIENT)
Dept: FAMILY MEDICINE CLINIC | Facility: CLINIC | Age: 81
End: 2021-04-14

## 2021-04-14 ENCOUNTER — OFFICE VISIT (OUTPATIENT)
Dept: FAMILY MEDICINE CLINIC | Facility: CLINIC | Age: 81
End: 2021-04-14
Payer: COMMERCIAL

## 2021-04-14 VITALS
BODY MASS INDEX: 30.39 KG/M2 | TEMPERATURE: 98.5 F | OXYGEN SATURATION: 96 % | WEIGHT: 178 LBS | SYSTOLIC BLOOD PRESSURE: 134 MMHG | HEIGHT: 64 IN | HEART RATE: 74 BPM | DIASTOLIC BLOOD PRESSURE: 82 MMHG

## 2021-04-14 DIAGNOSIS — M54.16 LUMBAR RADICULOPATHY: ICD-10-CM

## 2021-04-14 DIAGNOSIS — J44.9 CHRONIC OBSTRUCTIVE PULMONARY DISEASE, UNSPECIFIED COPD TYPE (HCC): ICD-10-CM

## 2021-04-14 DIAGNOSIS — E11.42 TYPE 2 DIABETES MELLITUS WITH DIABETIC POLYNEUROPATHY, WITHOUT LONG-TERM CURRENT USE OF INSULIN (HCC): ICD-10-CM

## 2021-04-14 DIAGNOSIS — E78.00 HYPERCHOLESTEROLEMIA: ICD-10-CM

## 2021-04-14 DIAGNOSIS — Z00.00 MEDICARE ANNUAL WELLNESS VISIT, SUBSEQUENT: Primary | ICD-10-CM

## 2021-04-14 DIAGNOSIS — I10 ESSENTIAL HYPERTENSION: ICD-10-CM

## 2021-04-14 DIAGNOSIS — I73.9 PERIPHERAL VASCULAR DISEASE, UNSPECIFIED (HCC): ICD-10-CM

## 2021-04-14 LAB — SL AMB POCT HEMOGLOBIN AIC: 6.2 (ref ?–6.5)

## 2021-04-14 PROCEDURE — 3725F SCREEN DEPRESSION PERFORMED: CPT | Performed by: FAMILY MEDICINE

## 2021-04-14 PROCEDURE — 1125F AMNT PAIN NOTED PAIN PRSNT: CPT | Performed by: FAMILY MEDICINE

## 2021-04-14 PROCEDURE — 3079F DIAST BP 80-89 MM HG: CPT | Performed by: FAMILY MEDICINE

## 2021-04-14 PROCEDURE — G0439 PPPS, SUBSEQ VISIT: HCPCS | Performed by: FAMILY MEDICINE

## 2021-04-14 PROCEDURE — 3288F FALL RISK ASSESSMENT DOCD: CPT | Performed by: FAMILY MEDICINE

## 2021-04-14 PROCEDURE — 1036F TOBACCO NON-USER: CPT | Performed by: FAMILY MEDICINE

## 2021-04-14 PROCEDURE — 83036 HEMOGLOBIN GLYCOSYLATED A1C: CPT | Performed by: FAMILY MEDICINE

## 2021-04-14 PROCEDURE — 1160F RVW MEDS BY RX/DR IN RCRD: CPT | Performed by: FAMILY MEDICINE

## 2021-04-14 PROCEDURE — 99214 OFFICE O/P EST MOD 30 MIN: CPT | Performed by: FAMILY MEDICINE

## 2021-04-14 PROCEDURE — 3075F SYST BP GE 130 - 139MM HG: CPT | Performed by: FAMILY MEDICINE

## 2021-04-14 PROCEDURE — 1170F FXNL STATUS ASSESSED: CPT | Performed by: FAMILY MEDICINE

## 2021-04-14 PROCEDURE — 1101F PT FALLS ASSESS-DOCD LE1/YR: CPT | Performed by: FAMILY MEDICINE

## 2021-04-14 RX ORDER — MELATONIN
1000 DAILY
COMMUNITY

## 2021-04-14 RX ORDER — ALBUTEROL SULFATE 90 UG/1
2 AEROSOL, METERED RESPIRATORY (INHALATION) EVERY 6 HOURS PRN
Qty: 1 INHALER | Refills: 5 | Status: SHIPPED | OUTPATIENT
Start: 2021-04-14 | End: 2022-01-19 | Stop reason: SDUPTHER

## 2021-04-14 RX ORDER — HYDROCODONE BITARTRATE AND ACETAMINOPHEN 5; 325 MG/1; MG/1
1 TABLET ORAL 3 TIMES DAILY PRN
Qty: 60 TABLET | Refills: 0 | Status: SHIPPED | OUTPATIENT
Start: 2021-04-14 | End: 2021-05-17

## 2021-04-14 NOTE — PROGRESS NOTES
Assessment and Plan:     Problem List Items Addressed This Visit     None           Preventive health issues were discussed with patient, and age appropriate screening tests were ordered as noted in patient's After Visit Summary  Personalized health advice and appropriate referrals for health education or preventive services given if needed, as noted in patient's After Visit Summary  History of Present Illness:     Patient presents for Medicare Annual Wellness visit  She is up to date with preventative testing  Patient Care Team:  Zaynab Nguyen DO as PCP - General  Zaynab Nguyen DO as PCP - PCP-[facility] Attributed-Roster  Gena Carrero DO     Problem List:     Patient Active Problem List   Diagnosis    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 ([address] )    Anxiety    Bilateral pneumonia    Centrilobular emphysema ([address] )    CHF (congestive heart failure) ([address] )    Chronic airway obstruction ([address] )    Coronary atherosclerosis    GERD without esophagitis    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, or chronic kidney disease ([address] )    Hypercholesterolemia    Hypothyroidism    Lumbar radiculopathy    Obesity (BMI 30 0-34  9)    Osteopenia    [facility]peripheral artery [facility]) ([facility])    Tobacco abuse    Unspecified atrial fibrillation (HCC)    Stage 3a chronic kidney disease    Secondary hyperparathyroidism of renal origin ([address] )    Vitamin D deficiency    Other specified diabetes mellitus with diabetic peripheral angiopathy without gangrene ([address] )      Past Medical and Surgical History:     Past Medical History:   Diagnosis Date    Coronary artery disease     Hyperlipidemia     Hypertension     Peripheral vascular disease ([address] )      Past Surgical History:   Procedure Laterality Date    CARDIAC SURGERY      HYSTERECTOMY      age 32   [facility] OOPHORECTOMY Right     age 32    SMALL INTESTINE SURGERY      6 inches    TONSILLECTOMY      US GUIDED BREAST BIOPSY RIGHT COMPLETE Right 2019      Family History:     Family History   Problem Relation Age of Onset    Hypertension Father         essential     Heart disease Father     Kidney disease Mother     Diabetes Mother       Social History:        Social History     Socioeconomic History    Marital status:       Spouse name: None    Number of children: 3    Years of education: less than high school    Highest education level: None   Occupational History    None   Social Needs    Financial resource strain: None    Food insecurity     Worry: None     Inability: None    Transportation needs     Medical: None     Non-medical: None   Tobacco Use    Smoking status: Former Smoker     Quit date: 3/1/2019     Years since quittin 1    Smokeless tobacco: Never Used   Substance and Sexual Activity    Alcohol use: No    Drug use: Never    Sexual activity: Not Currently   Lifestyle    Physical activity     Days per week: None     Minutes per session: None    Stress: None   Relationships    Social connections     Talks on phone: None     Gets together: None     Attends Hindu service: None     Active member of club or organization: None     Attends meetings of clubs or organizations: None     Relationship status: None    Intimate partner violence     Fear of current or ex partner: None     Emotionally abused: None     Physically abused: None     Forced sexual activity: None   Other Topics Concern    None   Social History Narrative    Daily coffee consumption:    Daily cola consumption:     Tea    Water intake, adequate (per day)      Medications and Allergies:     Current Outpatient Medications   Medication Sig Dispense Refill    acetaminophen (TYLENOL) 500 mg tablet Take 500 mg by mouth every 6 (six) hours      albuterol (2 5 mg/3 mL) 0 083 % nebulizer solution inhale contents of 1 vial in nebulizer every 6 hours if needed 300 mL 3    ALPRAZolam (XANAX) 0 25 mg tablet take 1 tablet by mouth twice a day if needed for anxiety 180 tablet 3    aspirin (ELISE LOW DOSE) 81 mg EC tablet Take by mouth      budesonide-formoterol (Symbicort) 160-4 5 mcg/act inhaler Inhale 2 puffs 2 (two) times a day Rinse mouth after use   6 g 3    calcitriol (ROCALTROL) 0 25 mcg capsule Take 1 capsule (0 25 mcg total) by mouth 3 (three) times a week 36 capsule 4    candesartan (ATACAND) 8 MG tablet Take 1 tablet (8 mg total) by mouth daily at bedtime 90 tablet 3    carvedilol (COREG) 12 5 mg tablet Take 1 tablet (12 5 mg total) by mouth 2 (two) times a day with meals 180 tablet 3    cholecalciferol (VITAMIN D3) 1,000 units tablet Take 1,000 Units by mouth daily      clopidogrel (PLAVIX) 75 mg tablet Take 75 mg by mouth daily   0    Diclofenac Sodium (VOLTAREN) 1 % Apply 2 g topically 4 (four) times a day 100 g 3    escitalopram (LEXAPRO) 10 mg tablet Take 1 tablet (10 mg total) by mouth daily 30 tablet 5    fluticasone (FLONASE) 50 mcg/act nasal spray 1 spray into each nostril daily 16 g 3    HYDROcodone-acetaminophen (NORCO) 5-325 mg per tablet Take 1 tablet by mouth every 6 (six) hours as needed for painMax Daily Amount: 4 tablets 30 tablet 0    levothyroxine 125 mcg tablet Take 1 tablet (125 mcg total) by mouth daily in the early morning 30 tablet 0    niacin (NIASPAN) 1000 MG CR tablet Take 1,000 mg by mouth      pantoprazole (PROTONIX) 40 mg tablet TAKE 1 TABLET BY MOUTH 2 TIMES DAILY (Patient taking differently: Take 40 mg by mouth daily ) 180 tablet 3    rosuvastatin (CRESTOR) 40 MG tablet Take 40 mg by mouth daily      tiotropium (Spiriva HandiHaler) 18 mcg inhalation capsule Place 1 capsule (18 mcg total) into inhaler and inhale daily 1 each 3    torsemide (DEMADEX) 20 mg tablet Take 1 tablet (20 mg total) by mouth daily 90 tablet 3    zolpidem (AMBIEN CR) 6 25 MG CR tablet Take 1 tablet (6 25 mg total) by mouth daily at bedtime as needed for sleep (Patient taking differently: Take 6 25 mg by mouth daily at bedtime ) 30 tablet 3     No current facility-administered medications for this visit  No Known Allergies   Immunizations:     Immunization History   Administered Date(s) Administered    INFLUENZA 09/29/2018    Influenza Split High Dose Preservative Free IM 09/20/2011, 10/22/2013, 10/07/2014, 11/10/2015, 11/21/2016, 10/20/2017    Influenza, high dose seasonal 0 7 mL 09/25/2019, 10/07/2020    Influenza, seasonal, injectable 10/24/2008, 10/14/2009, 11/04/2010    Pneumococcal Conjugate 13-Valent 11/10/2015    Pneumococcal Polysaccharide PPV23 11/23/2007, 10/23/2014    Sars-cov-2 / Covid-19 vector-nr, rS-[id number] vaccine [employer]ole [employer] / [employer]) 03/12/2021    Tdap 11/04/2010      Health Maintenance:         Topic Date Due    DXA SCAN  02/20/2020         Topic Date Due    DTaP,Tdap,and Td Vaccines (2 - Td) 11/04/2020      Medicare Health Risk Assessment:     /82   Pulse 74   Temp 98 5 °F (36 9 °C)   Ht 5' 4" (1 626 m)   Wt 80 7 kg (178 lb)   SpO2 96%   BMI 30 55 kg/m²      Violet is here for her Subsequent Wellness visit  Last Medicare Wellness visit information reviewed, patient interviewed, no change since last AWV  Health Risk Assessment:   Patient rates overall health as good  Patient feels that their physical health rating is same  Patient is satisfied with their life  Eyesight was rated as same  Hearing was rated as same  Patient feels that their emotional and mental health rating is same  Patients states they are sometimes angry  Patient states they are always unusually tired/fatigued  Pain experienced in the last 7 days has been a lot  Patient's pain rating has been 7/10  Patient states that she has experienced no weight loss or gain in last 6 months  Depression Screening:   PHQ-2 Score: 0      Fall Risk Screening:    In the past year, patient has experienced: no history of falling in past year      Urinary Incontinence Screening:   Patient has not leaked urine accidently in the last six months  Home Safety:  Patient does not have trouble with stairs inside or outside of their home  Patient has working smoke alarms and has no working carbon monoxide detector  Home safety hazards include: none  Nutrition:   Current diet is Regular, No Added Salt and Limited junk food  Medications:   Patient is currently taking over-the-counter supplements  OTC medications include: see medication list  Patient is able to manage medications  Activities of Daily Living (ADLs)/Instrumental Activities of Daily Living (IADLs):   Walk and transfer into and out of bed and chair?: Yes  Dress and groom yourself?: Yes    Bathe or shower yourself?: Yes    Feed yourself?  Yes  Do your laundry/housekeeping?: Yes  Manage your money, pay your bills and track your expenses?: Yes  Make your own meals?: Yes    Do your own shopping?: No    Previous Hospitalizations:   Any hospitalizations or ED visits within the last 12 months?: No      Advance Care Planning:   Living will: No    Durable POA for healthcare: No    Advanced directive: No    Advanced directive counseling given: Yes    Five wishes given: Yes    Patient declined ACP directive: No    End of Life Decisions reviewed with patient: Yes    Provider agrees with end of life decisions: Yes      Cognitive Screening:   Provider or family/friend/caregiver concerned regarding cognition?: No    PREVENTIVE SCREENINGS      Cardiovascular Screening:    General: Screening Not Indicated and History Lipid Disorder      Diabetes Screening:     General: Screening Not Indicated and History Diabetes      Colorectal Cancer Screening:     General: Screening Current      Breast Cancer Screening:     General: Screening Current      Cervical Cancer Screening:    General: Screening Not Indicated      Osteoporosis Screening:    General: Screening Current      Abdominal Aortic Aneurysm (AAA) Screening:        General: Screening Current and Screening Not Indicated      Lung Cancer Screening:     General: Screening Not Indicated      Hepatitis C Screening:    General: Screening Current    Screening, Brief Intervention, and Referral to Treatment (SBIRT)    Screening  Typical number of drinks in a day: 0  Typical number of drinks in a week: 0  Interpretation: Low risk drinking behavior  Single Item Drug Screening:  How often have you used an illegal drug (including marijuana) or a prescription medication for non-medical reasons in the past year? never    Single Item Drug Screen Score: 0  Interpretation: Negative screen for possible drug use disorder    BMI Counseling: Body mass index is 30 55 kg/m²  The BMI is above normal  Nutrition recommendations include reducing portion sizes, decreasing overall calorie intake and moderation in carbohydrate intake       Albert Ji Gilliland, DO

## 2021-04-14 NOTE — PATIENT INSTRUCTIONS
Medicare Preventive Visit Patient Instructions  Thank you for completing your Welcome to Medicare Visit or Medicare Annual Wellness Visit today  Your next wellness visit will be due in one year (4/15/2022)  The screening/preventive services that you may require over the next 5-10 years are detailed below  Some tests may not apply to you based off risk factors and/or age  Screening tests ordered at today's visit but not completed yet may show as past due  Also, please note that scanned in results may not display below  Preventive Screenings:  Service Recommendations Previous Testing/Comments   Colorectal Cancer Screening  * Colonoscopy    * Fecal Occult Blood Test (FOBT)/Fecal Immunochemical Test (FIT)  * Fecal DNA/Cologuard Test  * Flexible Sigmoidoscopy Age: 54-65 years old   Colonoscopy: every 10 years (may be performed more frequently if at higher risk)  OR  FOBT/FIT: every 1 year  OR  Cologuard: every 3 years  OR  Sigmoidoscopy: every 5 years  Screening may be recommended earlier than age 48 if at higher risk for colorectal cancer  Also, an individualized decision between you and your healthcare provider will decide whether screening between the ages of 74-80 would be appropriate  Colonoscopy: Not on file  FOBT/FIT: Not on file  Cologuard: Not on file  Sigmoidoscopy: Not on file          Breast Cancer Screening Age: 36 years old  Frequency: every 1-2 years  Not required if history of left and right mastectomy Mammogram: 04/17/2019    Screening Current   Cervical Cancer Screening Between the ages of 21-29, pap smear recommended once every 3 years  Between the ages of 33-67, can perform pap smear with HPV co-testing every 5 years     Recommendations may differ for women with a history of total hysterectomy, cervical cancer, or abnormal pap smears in past  Pap Smear: Not on file    Screening Not Indicated   Hepatitis C Screening Once for adults born between 1945 and 1965  More frequently in patients at high risk for Hepatitis C Hep C Antibody: Not on file        Diabetes Screening 1-2 times per year if you're at risk for diabetes or have pre-diabetes Fasting glucose: 118 mg/dL   A1C: 6 4    Screening Not Indicated  History Diabetes   Cholesterol Screening Once every 5 years if you don't have a lipid disorder  May order more often based on risk factors  Lipid panel: 05/19/2020    Screening Not Indicated  History Lipid Disorder     Other Preventive Screenings Covered by Medicare:  1  Abdominal Aortic Aneurysm (AAA) Screening: covered once if your at risk  You're considered to be at risk if you have a family history of AAA  2  Lung Cancer Screening: covers low dose CT scan once per year if you meet all of the following conditions: (1) Age 50-69; (2) No signs or symptoms of lung cancer; (3) Current smoker or have quit smoking within the last 15 years; (4) You have a tobacco smoking history of at least 30 pack years (packs per day multiplied by number of years you smoked); (5) You get a written order from a healthcare provider  3  Glaucoma Screening: covered annually if you're considered high risk: (1) You have diabetes OR (2) Family history of glaucoma OR (3)  aged 48 and older OR (3)  American aged 72 and older  3  Osteoporosis Screening: covered every 2 years if you meet one of the following conditions: (1) You're estrogen deficient and at risk for osteoporosis based off medical history and other findings; (2) Have a vertebral abnormality; (3) On glucocorticoid therapy for more than 3 months; (4) Have primary hyperparathyroidism; (5) On osteoporosis medications and need to assess response to drug therapy  · Last bone density test (DXA Scan): 02/20/2017  5  HIV Screening: covered annually if you're between the age of 12-76  Also covered annually if you are younger than 13 and older than 72 with risk factors for HIV infection   For pregnant patients, it is covered up to 3 times per pregnancy  Immunizations:  Immunization Recommendations   Influenza Vaccine Annual influenza vaccination during flu season is recommended for all persons aged >= 6 months who do not have contraindications   Pneumococcal Vaccine (Prevnar and Pneumovax)  * Prevnar = PCV13  * Pneumovax = PPSV23   Adults 25-60 years old: 1-3 doses may be recommended based on certain risk factors  Adults 72 years old: Prevnar (PCV13) vaccine recommended followed by Pneumovax (PPSV23) vaccine  If already received PPSV23 since turning 65, then PCV13 recommended at least one year after PPSV23 dose  Hepatitis B Vaccine 3 dose series if at intermediate or high risk (ex: diabetes, end stage renal disease, liver disease)   Tetanus (Td) Vaccine - COST NOT COVERED BY MEDICARE PART B Following completion of primary series, a booster dose should be given every 10 years to maintain immunity against tetanus  Td may also be given as tetanus wound prophylaxis  Tdap Vaccine - COST NOT COVERED BY MEDICARE PART B Recommended at least once for all adults  For pregnant patients, recommended with each pregnancy  Shingles Vaccine (Shingrix) - COST NOT COVERED BY MEDICARE PART B  2 shot series recommended in those aged 48 and above     Health Maintenance Due:      Topic Date Due    DXA SCAN  02/20/2020     Immunizations Due:      Topic Date Due    DTaP,Tdap,and Td Vaccines (2 - Td) 11/04/2020     Advance Directives   What are advance directives? Advance directives are legal documents that state your wishes and plans for medical care  These plans are made ahead of time in case you lose your ability to make decisions for yourself  Advance directives can apply to any medical decision, such as the treatments you want, and if you want to donate organs  What are the types of advance directives? There are many types of advance directives, and each state has rules about how to use them   You may choose a combination of any of the following:  · Living will:  This is a written record of the treatment you want  You can also choose which treatments you do not want, which to limit, and which to stop at a certain time  This includes surgery, medicine, IV fluid, and tube feedings  · Durable power of  for healthcare [facility]): This is a written record that states who you want to make healthcare choices for you when you are unable to make them for yourself  This person, called a proxy, is usually a family member or a friend  You may choose more than 1 proxy  · Do not resuscitate (DNR) order:  A DNR order is used in case your heart stops beating or you stop breathing  It is a request not to have certain forms of treatment, such as CPR  A DNR order may be included in other types of advance directives  · Medical directive: This covers the care that you want if you are in a coma, near death, or unable to make decisions for yourself  You can list the treatments you want for each condition  Treatment may include pain medicine, surgery, blood transfusions, dialysis, IV or tube feedings, and a ventilator (breathing machine)  · Values history: This document has questions about your views, beliefs, and how you feel and think about life  This information can help others choose the care that you would choose  Why are advance directives important? An advance directive helps you control your care  Although spoken wishes may be used, it is better to have your wishes written down  Spoken wishes can be misunderstood, or not followed  Treatments may be given even if you do not want them  An advance directive may make it easier for your family to make difficult choices about your care  Weight Management   Why it is important to manage your weight:  Being overweight increases your risk of health conditions such as heart disease, high blood pressure, type 2 diabetes, and certain types of cancer   It can also increase your risk for osteoarthritis, sleep apnea, and other respiratory problems  Aim for a slow, steady weight loss  Even a small amount of weight loss can lower your risk of health problems  How to lose weight safely:  A safe and healthy way to lose weight is to eat fewer calories and get regular exercise  You can lose up about 1 pound a week by decreasing the number of calories you eat by 500 calories each day  Healthy meal plan for weight management:  A healthy meal plan includes a variety of foods, contains fewer calories, and helps you stay healthy  A healthy meal plan includes the following:  · Eat whole-grain foods more often  A healthy meal plan should contain fiber  Fiber is the part of grains, fruits, and vegetables that is not broken down by your body  Whole-grain foods are healthy and provide extra fiber in your diet  Some examples of whole-grain foods are whole-wheat breads and pastas, oatmeal, brown rice, and bulgur  · Eat a variety of vegetables every day  Include dark, leafy greens such as spinach, kale, deny greens, and mustard greens  Eat yellow and orange vegetables such as carrots, sweet potatoes, and winter squash  · Eat a variety of fruits every day  Choose fresh or canned fruit (canned in its own juice or light syrup) instead of juice  Fruit juice has very little or no fiber  · Eat low-fat dairy foods  Drink fat-free (skim) milk or 1% milk  Eat fat-free yogurt and low-fat cottage cheese  Try low-fat cheeses such as mozzarella and other reduced-fat cheeses  · Choose meat and other protein foods that are low in fat  Choose beans or other legumes such as split peas or lentils  Choose fish, skinless poultry (chicken or turkey), or lean cuts of red meat (beef or pork)  Before you cook meat or poultry, cut off any visible fat  · Use less fat and oil  Try baking foods instead of frying them  Add less fat, such as margarine, sour cream, regular salad dressing and mayonnaise to foods  Eat fewer high-fat foods   Some examples of high-fat foods include french fries, doughnuts, ice cream, and cakes  · Eat fewer sweets  Limit foods and drinks that are high in sugar  This includes candy, cookies, regular soda, and sweetened drinks  Exercise:  Exercise at least 30 minutes per day on most days of the week  Some examples of exercise include walking, biking, dancing, and swimming  You can also fit in more physical activity by taking the stairs instead of the elevator or parking farther away from stores  Ask your healthcare provider about the best exercise plan for you  © Copyright [address] 2018 Information is for End User's use only and may not be sold, redistributed or otherwise used for commercial purposes   All illustrations and images included in CareNotes® are the copyrighted property of A D [employer] M [employer]  or [address] [place] [address]

## 2021-04-14 NOTE — TELEPHONE ENCOUNTER
Erika guerrero - need a script written out for this with a dx and will fax over to them    Fax: [phone]

## 2021-04-20 NOTE — PROGRESS NOTES
Patient ID: Shellie Lewis is a [de-identified] y o  female  HPI: [de-identified] y  o female presents for follow up of niddm, hypertension and hypercholesterolemia  Hgba1c is 6 2  and patient's issues are well controlled  Patient deneis any dyspnea, chest pain or palpitations  Her copd is well controlled as is her pvd but she does complain of ongoing symptoms of pain from lumbar radiculopathy  SUBJECTIVE    Family History   Problem Relation Age of Onset    Hypertension Father         essential     Heart disease Father     Kidney disease Mother     Diabetes Mother      Social History     Socioeconomic History    Marital status:       Spouse name: Not on file    Number of children: 3    Years of education: less than high school    Highest education level: Not on file   Occupational History    Not on file   Social Needs    Financial resource strain: Not on file    Food insecurity     Worry: Not on file     Inability: Not on file    Transportation needs     Medical: Not on file     Non-medical: Not on file   Tobacco Use    Smoking status: Former Smoker     Quit date: 3/1/2019     Years since quittin 1    Smokeless tobacco: Never Used   Substance and Sexual Activity    Alcohol use: No    Drug use: Never    Sexual activity: Not Currently   Lifestyle    Physical activity     Days per week: Not on file     Minutes per session: Not on file    Stress: Not on file   Relationships    Social connections     Talks on phone: Not on file     Gets together: Not on file     Attends Buddhist service: Not on file     Active member of club or organization: Not on file     Attends meetings of clubs or organizations: Not on file     Relationship status: Not on file    Intimate partner violence     Fear of current or ex partner: Not on file     Emotionally abused: Not on file     Physically abused: Not on file     Forced sexual activity: Not on file   Other Topics Concern    Not on file   Social History Narrative    Daily coffee consumption:    Daily cola consumption:     Tea    Water intake, adequate (per day)     Past Medical History:   Diagnosis Date    Coronary artery disease     Hyperlipidemia     Hypertension     Peripheral vascular disease (HCC)      Past Surgical History:   Procedure Laterality Date    CARDIAC SURGERY      HYSTERECTOMY      age 32   [place] [place] OOPHORECTOMY Right     age 32   [facility] [place] [facility] SMALL INTESTINE SURGERY      6 inches    TONSILLECTOMY      US GUIDED BREAST BIOPSY RIGHT COMPLETE Right 4/24/2019     No Known Allergies    Current Outpatient Medications:     acetaminophen (TYLENOL) 500 mg tablet, Take 500 mg by mouth every 6 (six) hours, Disp: , Rfl:     albuterol (2 5 mg/3 mL) 0 083 % nebulizer solution, inhale contents of 1 vial in nebulizer every 6 hours if needed, Disp: 300 mL, Rfl: 3    ALPRAZolam (XANAX) 0 25 mg tablet, take 1 tablet by mouth twice a day if needed for anxiety, Disp: 180 tablet, Rfl: 3    aspirin (ELISE LOW DOSE) 81 mg EC tablet, Take by mouth, Disp: , Rfl:     budesonide-formoterol (Symbicort) 160-4 5 mcg/act inhaler, Inhale 2 puffs 2 (two) times a day Rinse mouth after use , Disp: 6 g, Rfl: 3    calcitriol (ROCALTROL) 0 25 mcg capsule, Take 1 capsule (0 25 mcg total) by mouth 3 (three) times a week, Disp: 36 capsule, Rfl: 4    candesartan (ATACAND) 8 MG tablet, Take 1 tablet (8 mg total) by mouth daily at bedtime, Disp: 90 tablet, Rfl: 3    carvedilol (COREG) 12 5 mg tablet, Take 1 tablet (12 5 mg total) by mouth 2 (two) times a day with meals, Disp: 180 tablet, Rfl: 3    cholecalciferol (VITAMIN D3) 1,000 units tablet, Take 1,000 Units by mouth daily, Disp: , Rfl:     clopidogrel (PLAVIX) 75 mg tablet, Take 75 mg by mouth daily , Disp: , Rfl: 0    Diclofenac Sodium (VOLTAREN) 1 %, Apply 2 g topically 4 (four) times a day, Disp: 100 g, Rfl: 3    escitalopram (LEXAPRO) 10 mg tablet, Take 1 tablet (10 mg total) by mouth daily, Disp: 30 tablet, Rfl: 5    fluticasone (FLONASE) 50 mcg/act nasal spray, 1 spray into each nostril daily, Disp: 16 g, Rfl: 3    HYDROcodone-acetaminophen (NORCO) 5-325 mg per tablet, Take 1 tablet by mouth 3 (three) times a day as needed for painMax Daily Amount: 3 tablets, Disp: 60 tablet, Rfl: 0    levothyroxine 125 mcg tablet, Take 1 tablet (125 mcg total) by mouth daily in the early morning, Disp: 30 tablet, Rfl: 0    niacin (NIASPAN) 1000 MG CR tablet, Take 1,000 mg by mouth, Disp: , Rfl:     pantoprazole (PROTONIX) 40 mg tablet, TAKE 1 TABLET BY MOUTH 2 TIMES DAILY (Patient taking differently: Take 40 mg by mouth daily ), Disp: 180 tablet, Rfl: 3    rosuvastatin (CRESTOR) 40 MG tablet, Take 40 mg by mouth daily, Disp: , Rfl:     tiotropium (Spiriva HandiHaler) 18 mcg inhalation capsule, Place 1 capsule (18 mcg total) into inhaler and inhale daily, Disp: 1 each, Rfl: 3    torsemide (DEMADEX) 20 mg tablet, Take 1 tablet (20 mg total) by mouth daily, Disp: 90 tablet, Rfl: 3    zolpidem (AMBIEN CR) 6 25 MG CR tablet, Take 1 tablet (6 25 mg total) by mouth daily at bedtime as needed for sleep (Patient taking differently: Take 6 25 mg by mouth daily at bedtime ), Disp: 30 tablet, Rfl: 3    albuterol (PROVENTIL HFA,VENTOLIN HFA) 90 mcg/act inhaler, Inhale 2 puffs every 6 (six) hours as needed for wheezing or shortness of breath, Disp: 1 Inhaler, Rfl: 5    Review of Systems  Constitutional:     Denies fever, chills ,fatigue ,weakness ,weight loss, weight gain     ENT: Denies earache ,loss of hearing ,nosebleed, nasal discharge,nasal congestion ,sore throat ,hoarseness  Pulmonary: Denies shortness of breath ,cough  ,+ chronicdyspnea on exertion, orthopnea  ,PND   Cardiovascular:  Denies bradycardia , tachycardia  ,palpations, lower extremity edema leg, claudication  Breast:  Denies new or changing breast lumps ,nipple discharge ,nipple changes  Abdomen:  Denies abdominal pain , anorexia , indigestion, nausea, vomiting, constipation, diarrhea  Musculoskeletal: Denies myalgias, , joint swelling, joint stiffness ,+ chronic lumbar back pain with radiation to lower legs bilaterlly   Gu: denies dysuria, polyuria  Skin: Denies skin rash, skin lesion, skin wound, itching, dry skin  Neuro: Denies headache, numbness, tingling, confusion, loss of consciousness, dizziness, vertigo  Psychiatric: Denies feelings of depression, suicidal ideation, anxiety, sleep disturbances    OBJECTIVE  /82   Pulse 74   Temp 98 5 °F (36 9 °C)   Ht 5' 4" (1 626 m)   Wt 80 7 kg (178 lb)   SpO2 96%   BMI 30 55 kg/m²   Constitutional:   NAD, well appearing and well nourished      ENT:   Conjunctiva and lids: no injection, edema, or discharge     Pupils and iris: MILADY bilaterally    External inspection of ears and nose: normal without deformities or discharge  Otoscopic exam: Canals patent without erythema  Nasal mucosa, septum and turbinates: Normal or edema or discharge         Oropharynx:  Moist mucosa, normal tongue and tonsils without lesions  No erythema        Pulmonary:Respiratory effort normal rate and rhythm, no increased work of breathing   Auscultation of lungs:  Clear bilaterally with no adventitious breath sounds       Cardiovascular: regular rate and rhythm, S1 and S2, no murmur, no edema and/or varicosities of LE      Abdomen: Soft and non-distended     Positive bowel sounds    No heptomegaly or splenomegaly      Gu: no suprapubic tenderness or CVA tenderness, no urethral discharge  Lymphatic:  No anterior or posterior cervical lymphadenopathy         Musculoskeletal:  Gait and station: Normal gait      Digits and nails normal without clubbing or cyanosis       Inspection/palpation of joints, bones, and muscles:  No joint tenderness, swelling, full active and passive range of motion       Skin: Normal skin turgor and no rashes      Neuro:    Normal reflexes     Psych:   alert and oriented to person, place and time     normal mood and affect Assessment/Plan:Diagnoses and all orders for this visit:    Medicare annual wellness visit, subsequent    Type 2 diabetes mellitus with diabetic polyneuropathy, without long-term current use of insulin ([facility] )  Comments:  Stable on present meds  Orders:  -     POCT hemoglobin A1c    Essential hypertension  Comments:  Stable on ARB therapy     Hypercholesterolemia  Comments:  Continue with statin thearpy   Orders:  -     Lipid Panel with Direct LDL reflex; Future    Chronic obstructive pulmonary disease, unspecified COPD type (HCC)  -     albuterol (PROVENTIL HFA,VENTOLIN HFA) 90 mcg/act inhaler; Inhale 2 puffs every 6 (six) hours as needed for wheezing or shortness of breath    Peripheral vascular disease, unspecified ([facility] )  Comments:  Continue with surveillence as per vascular surgery  Lumbar radiculopathy  -     HYDROcodone-acetaminophen (NORCO) 5-325 mg per tablet; Take 1 tablet by mouth 3 (three) times a day as needed for painMax Daily Amount: 3 tablets    Other orders  -     cholecalciferol (VITAMIN D3) 1,000 units tablet; Take 1,000 Units by mouth daily        Reviewed with patient plan to treat with above plan      Patient instructed to call in 72 hours if not feeling better or if symptoms worsen

## 2021-04-28 DIAGNOSIS — E03.9 HYPOTHYROIDISM, UNSPECIFIED TYPE: ICD-10-CM

## 2021-04-28 RX ORDER — LEVOTHYROXINE SODIUM 0.12 MG/1
125 TABLET ORAL
Qty: 30 TABLET | Refills: 0 | Status: SHIPPED | OUTPATIENT
Start: 2021-04-28 | End: 2021-06-02 | Stop reason: SDUPTHER

## 2021-04-28 NOTE — TELEPHONE ENCOUNTER
Patient called to report that she has begun smoking again and would like Chantix to help her quit again

## 2021-04-29 ENCOUNTER — TELEPHONE (OUTPATIENT)
Dept: FAMILY MEDICINE CLINIC | Facility: CLINIC | Age: 81
End: 2021-04-29

## 2021-04-29 DIAGNOSIS — Z71.6 ENCOUNTER FOR SMOKING CESSATION COUNSELING: Primary | ICD-10-CM

## 2021-04-29 RX ORDER — VARENICLINE TARTRATE 25 MG
KIT ORAL
Qty: 53 TABLET | Refills: 0 | Status: SHIPPED | OUTPATIENT
Start: 2021-04-29 | End: 2022-01-20

## 2021-04-29 NOTE — TELEPHONE ENCOUNTER
Patient called stating she would like a refill on her Chantix  She unfortunately fell off the wagon and would like to restart this       [id number] to check with pharmacy

## 2021-05-15 DIAGNOSIS — M54.16 LUMBAR RADICULOPATHY: ICD-10-CM

## 2021-05-17 RX ORDER — HYDROCODONE BITARTRATE AND ACETAMINOPHEN 5; 325 MG/1; MG/1
TABLET ORAL
Qty: 60 TABLET | Refills: 0 | Status: SHIPPED | OUTPATIENT
Start: 2021-05-17 | End: 2021-06-24 | Stop reason: SDUPTHER

## 2021-06-02 DIAGNOSIS — E03.9 HYPOTHYROIDISM, UNSPECIFIED TYPE: ICD-10-CM

## 2021-06-02 RX ORDER — LEVOTHYROXINE SODIUM 0.12 MG/1
125 TABLET ORAL
Qty: 30 TABLET | Refills: 0 | Status: SHIPPED | OUTPATIENT
Start: 2021-06-02 | End: 2021-06-24 | Stop reason: SDUPTHER

## 2021-06-14 DIAGNOSIS — J30.89 ALLERGIC RHINITIS DUE TO OTHER ALLERGIC TRIGGER, UNSPECIFIED SEASONALITY: ICD-10-CM

## 2021-06-14 RX ORDER — FLUTICASONE PROPIONATE 50 MCG
1 SPRAY, SUSPENSION (ML) NASAL DAILY
Qty: 16 G | Refills: 0 | Status: SHIPPED | OUTPATIENT
Start: 2021-06-14 | End: 2022-01-19 | Stop reason: SDUPTHER

## 2021-06-15 DIAGNOSIS — G47.00 INSOMNIA, UNSPECIFIED TYPE: ICD-10-CM

## 2021-06-15 RX ORDER — ZOLPIDEM TARTRATE 6.25 MG/1
6.25 TABLET, FILM COATED, EXTENDED RELEASE ORAL
Qty: 30 TABLET | Refills: 0 | Status: SHIPPED | OUTPATIENT
Start: 2021-06-15 | End: 2021-07-14 | Stop reason: SDUPTHER

## 2021-06-15 NOTE — TELEPHONE ENCOUNTER
Last OV 4/14/2021  Next OV 8/25/2021 05/17/2021  1  05/17/2021  HYDROCODONE-ACETAMIN 5-325 MG  60 0  20  [facility]  [id number]  [facility] ([id number])  0  15 0 MME  Medicare  [place]     05/15/2021  1  02/11/2021  ZOLPIDEM TART ER 6 25 MG TAB  30 0  30  [facility]  [id number]  [facility] ([id number])  3   Medicare [place]     04/14/2021  2  04/14/2021  HYDROCODONE-ACETAMIN 5-325 MG  60 0  20  [facility]  [id number]  [facility] ([id number])  0  15 0 MME  Medicare

## 2021-06-24 DIAGNOSIS — M54.16 LUMBAR RADICULOPATHY: ICD-10-CM

## 2021-06-24 DIAGNOSIS — E03.9 HYPOTHYROIDISM, UNSPECIFIED TYPE: ICD-10-CM

## 2021-06-24 RX ORDER — LEVOTHYROXINE SODIUM 0.12 MG/1
125 TABLET ORAL
Qty: 30 TABLET | Refills: 0 | Status: SHIPPED | OUTPATIENT
Start: 2021-06-24 | End: 2021-07-26 | Stop reason: SDUPTHER

## 2021-06-24 RX ORDER — HYDROCODONE BITARTRATE AND ACETAMINOPHEN 5; 325 MG/1; MG/1
1 TABLET ORAL EVERY 6 HOURS PRN
Qty: 60 TABLET | Refills: 0 | Status: SHIPPED | OUTPATIENT
Start: 2021-06-24 | End: 2021-08-05 | Stop reason: SDUPTHER

## 2021-06-24 NOTE — TELEPHONE ENCOUNTER
Last OV 4/14/2021  Next OV 8/25/2021    06/15/2021  1  06/15/2021  ZOLPIDEM TART ER 6 25 MG TAB  30 0  30  [place]  [id number]  [facility] ([id number])  0   Medicare [place]     05/17/2021  1  05/17/2021  HYDROCODONE-ACETAMIN 5-325 MG  60 0  20  [facility]  [id number]  [facility] ([id number])  0  15 0 MME  Medicare [place]     05/15/2021  1  02/11/2021  ZOLPIDEM TART ER 6 25 MG TAB  30 0  30  [facility]  [id number]  [facility] ([id number])  3   Medicare [place]

## 2021-07-14 DIAGNOSIS — G47.00 INSOMNIA, UNSPECIFIED TYPE: ICD-10-CM

## 2021-07-14 DIAGNOSIS — F41.9 ANXIETY: ICD-10-CM

## 2021-07-14 RX ORDER — ALPRAZOLAM 0.25 MG/1
TABLET ORAL
Qty: 180 TABLET | Refills: 3 | Status: SHIPPED | OUTPATIENT
Start: 2021-07-14 | End: 2022-05-09 | Stop reason: SDUPTHER

## 2021-07-14 RX ORDER — ZOLPIDEM TARTRATE 6.25 MG/1
6.25 TABLET, FILM COATED, EXTENDED RELEASE ORAL
Qty: 30 TABLET | Refills: 0 | Status: SHIPPED | OUTPATIENT
Start: 2021-07-14 | End: 2021-08-16 | Stop reason: SDUPTHER

## 2021-07-26 DIAGNOSIS — E03.9 HYPOTHYROIDISM, UNSPECIFIED TYPE: ICD-10-CM

## 2021-07-26 DIAGNOSIS — F32.9 REACTIVE DEPRESSION: ICD-10-CM

## 2021-07-26 RX ORDER — ESCITALOPRAM OXALATE 10 MG/1
10 TABLET ORAL DAILY
Qty: 30 TABLET | Refills: 5 | Status: SHIPPED | OUTPATIENT
Start: 2021-07-26 | End: 2022-01-19 | Stop reason: SDUPTHER

## 2021-07-26 RX ORDER — LEVOTHYROXINE SODIUM 0.12 MG/1
125 TABLET ORAL
Qty: 30 TABLET | Refills: 3 | Status: SHIPPED | OUTPATIENT
Start: 2021-07-26 | End: 2022-03-01 | Stop reason: SDUPTHER

## 2021-08-05 ENCOUNTER — TELEPHONE (OUTPATIENT)
Dept: NEPHROLOGY | Facility: CLINIC | Age: 81
End: 2021-08-05

## 2021-08-05 DIAGNOSIS — M54.16 LUMBAR RADICULOPATHY: ICD-10-CM

## 2021-08-05 RX ORDER — HYDROCODONE BITARTRATE AND ACETAMINOPHEN 5; 325 MG/1; MG/1
1 TABLET ORAL EVERY 6 HOURS PRN
Qty: 60 TABLET | Refills: 0 | Status: SHIPPED | OUTPATIENT
Start: 2021-08-05 | End: 2021-09-15 | Stop reason: SDUPTHER

## 2021-08-16 DIAGNOSIS — G47.00 INSOMNIA, UNSPECIFIED TYPE: ICD-10-CM

## 2021-08-16 NOTE — TELEPHONE ENCOUNTER
Per PDMP last fill 07/14/21   Last OV 04/14/21, Next OV 08/25/21 08/05/2021 1 08/05/2021   HYDROCODONE-ACETAMIN 5-325 MG  60 0 15 [facility]   [id number]  [facility] ([id number]) 0 20 0 MME Medicare [place]    07/14/2021 1 07/14/2021   ZOLPIDEM TART ER 6 25 MG TAB  30 0 30 [facility]   [id number]  [facility] ([id number]) 0  Medicare [place]    07/14/2021 1 07/14/2021   ALPRAZOLAM 0 25 MG TABLET  180 0 90 [facility]   [id number]  [facility] ([id number]) 0  Medicare [place]

## 2021-08-17 RX ORDER — ZOLPIDEM TARTRATE 6.25 MG/1
6.25 TABLET, FILM COATED, EXTENDED RELEASE ORAL
Qty: 30 TABLET | Refills: 0 | Status: SHIPPED | OUTPATIENT
Start: 2021-08-17 | End: 2021-09-15 | Stop reason: SDUPTHER

## 2021-08-18 ENCOUNTER — RA CDI HCC (OUTPATIENT)
Dept: OTHER | Facility: HOSPITAL | Age: 81
End: 2021-08-18

## 2021-08-18 NOTE — PROGRESS NOTES
[facility]  coding opportunities          Number of diagnosis code(s) already on the problem list added to  fla     Chart Reviewed * (Number of) Inbasket suggestions sent to Provider: 1            Number of suggestions used: 1      Number of suggestions NOT actually used: 1     Patients insurance company: [employer] (Medicare Advantage and Commercial)   dx not on bill: [id number]  Visit status: Patient arrived for their scheduled appointment        [facility]  coding opportunities          Number of diagnosis code(s) already on the problem list added to  fla     Chart Reviewed * (Number of) Inbasket suggestions sent to Provider: 1   DX: not on problem list  F33 42-Major depressive disorder, recurrent, in full remission-per 2021 [place] visit-on lexapro    DX: on problem list  I48 91-Unspecified atrial fibrillation                   Patients insurance company: [employer] ([employer])

## 2021-08-25 ENCOUNTER — APPOINTMENT (OUTPATIENT)
Dept: LAB | Facility: CLINIC | Age: 81
End: 2021-08-25
Payer: COMMERCIAL

## 2021-08-25 ENCOUNTER — OFFICE VISIT (OUTPATIENT)
Dept: FAMILY MEDICINE CLINIC | Facility: CLINIC | Age: 81
End: 2021-08-25
Payer: COMMERCIAL

## 2021-08-25 VITALS
SYSTOLIC BLOOD PRESSURE: 122 MMHG | HEART RATE: 71 BPM | BODY MASS INDEX: 31.07 KG/M2 | WEIGHT: 182 LBS | TEMPERATURE: 98.1 F | OXYGEN SATURATION: 96 % | DIASTOLIC BLOOD PRESSURE: 78 MMHG | HEIGHT: 64 IN

## 2021-08-25 DIAGNOSIS — E55.9 VITAMIN D DEFICIENCY: ICD-10-CM

## 2021-08-25 DIAGNOSIS — N18.31 CKD STAGE G3A/A1, GFR 45-59 AND ALBUMIN CREATININE RATIO <30 MG/G (HCC): ICD-10-CM

## 2021-08-25 DIAGNOSIS — N18.31 STAGE 3A CHRONIC KIDNEY DISEASE (HCC): ICD-10-CM

## 2021-08-25 DIAGNOSIS — E78.00 HYPERCHOLESTEROLEMIA: ICD-10-CM

## 2021-08-25 DIAGNOSIS — Z72.0 TOBACCO ABUSE: ICD-10-CM

## 2021-08-25 DIAGNOSIS — E11.42 TYPE 2 DIABETES MELLITUS WITH DIABETIC POLYNEUROPATHY, WITHOUT LONG-TERM CURRENT USE OF INSULIN (HCC): Primary | ICD-10-CM

## 2021-08-25 DIAGNOSIS — E66.9 OBESITY (BMI 30.0-34.9): ICD-10-CM

## 2021-08-25 DIAGNOSIS — I50.33 ACUTE ON CHRONIC DIASTOLIC CONGESTIVE HEART FAILURE (HCC): ICD-10-CM

## 2021-08-25 DIAGNOSIS — I10 ESSENTIAL HYPERTENSION: ICD-10-CM

## 2021-08-25 DIAGNOSIS — J43.2 CENTRILOBULAR EMPHYSEMA (HCC): ICD-10-CM

## 2021-08-25 DIAGNOSIS — N25.81 SECONDARY HYPERPARATHYROIDISM OF RENAL ORIGIN (HCC): ICD-10-CM

## 2021-08-25 DIAGNOSIS — I48.91 ATRIAL FIBRILLATION, UNSPECIFIED TYPE (HCC): ICD-10-CM

## 2021-08-25 DIAGNOSIS — I13.0 HYPERTENSIVE HEART AND CHRONIC KIDNEY DISEASE WITH HEART FAILURE AND STAGE 1 THROUGH STAGE 4 CHRONIC KIDNEY DISEASE, OR CHRONIC KIDNEY DISEASE (HCC): ICD-10-CM

## 2021-08-25 LAB
25(OH)D3 SERPL-MCNC: 39.2 NG/ML (ref 30–100)
ALBUMIN SERPL BCP-MCNC: 3.5 G/DL (ref 3.5–5)
ANION GAP SERPL CALCULATED.3IONS-SCNC: 5 MMOL/L (ref 4–13)
BUN SERPL-MCNC: 30 MG/DL (ref 5–25)
CALCIUM SERPL-MCNC: 9 MG/DL (ref 8.3–10.1)
CHLORIDE SERPL-SCNC: 105 MMOL/L (ref 100–108)
CHOLEST SERPL-MCNC: 166 MG/DL (ref 50–200)
CO2 SERPL-SCNC: 29 MMOL/L (ref 21–32)
CREAT SERPL-MCNC: 1 MG/DL (ref 0.6–1.3)
CREAT UR-MCNC: 20.1 MG/DL
CREAT UR-MCNC: 20.1 MG/DL
GFR SERPL CREATININE-BSD FRML MDRD: 53 ML/MIN/1.73SQ M
GLUCOSE P FAST SERPL-MCNC: 105 MG/DL (ref 65–99)
HDLC SERPL-MCNC: 64 MG/DL
LDLC SERPL CALC-MCNC: 66 MG/DL (ref 0–100)
MICROALBUMIN UR-MCNC: <5 MG/L (ref 0–20)
MICROALBUMIN/CREAT 24H UR: <25 MG/G CREATININE (ref 0–30)
PHOSPHATE SERPL-MCNC: 3.3 MG/DL (ref 2.3–4.1)
POTASSIUM SERPL-SCNC: 4 MMOL/L (ref 3.5–5.3)
PROT UR-MCNC: <6 MG/DL
PROT/CREAT UR: <0.3 MG/G{CREAT} (ref 0–0.1)
PTH-INTACT SERPL-MCNC: 108.7 PG/ML (ref 18.4–80.1)
SL AMB POCT HEMOGLOBIN AIC: 5.2 (ref ?–6.5)
SODIUM SERPL-SCNC: 139 MMOL/L (ref 136–145)
TRIGL SERPL-MCNC: 178 MG/DL

## 2021-08-25 PROCEDURE — 3078F DIAST BP <80 MM HG: CPT | Performed by: FAMILY MEDICINE

## 2021-08-25 PROCEDURE — 1036F TOBACCO NON-USER: CPT | Performed by: FAMILY MEDICINE

## 2021-08-25 PROCEDURE — 1160F RVW MEDS BY RX/DR IN RCRD: CPT | Performed by: FAMILY MEDICINE

## 2021-08-25 PROCEDURE — 83970 ASSAY OF PARATHORMONE: CPT

## 2021-08-25 PROCEDURE — 99214 OFFICE O/P EST MOD 30 MIN: CPT | Performed by: FAMILY MEDICINE

## 2021-08-25 PROCEDURE — 82570 ASSAY OF URINE CREATININE: CPT

## 2021-08-25 PROCEDURE — 82043 UR ALBUMIN QUANTITATIVE: CPT | Performed by: FAMILY MEDICINE

## 2021-08-25 PROCEDURE — 36415 COLL VENOUS BLD VENIPUNCTURE: CPT

## 2021-08-25 PROCEDURE — 82570 ASSAY OF URINE CREATININE: CPT | Performed by: FAMILY MEDICINE

## 2021-08-25 PROCEDURE — 80061 LIPID PANEL: CPT

## 2021-08-25 PROCEDURE — 3074F SYST BP LT 130 MM HG: CPT | Performed by: FAMILY MEDICINE

## 2021-08-25 PROCEDURE — 84156 ASSAY OF PROTEIN URINE: CPT

## 2021-08-25 PROCEDURE — 82306 VITAMIN D 25 HYDROXY: CPT

## 2021-08-25 PROCEDURE — 80069 RENAL FUNCTION PANEL: CPT

## 2021-08-25 PROCEDURE — 83036 HEMOGLOBIN GLYCOSYLATED A1C: CPT | Performed by: FAMILY MEDICINE

## 2021-08-25 RX ORDER — VARENICLINE TARTRATE 25 MG
KIT ORAL
Qty: 53 TABLET | Refills: 0 | Status: SHIPPED | OUTPATIENT
Start: 2021-08-25 | End: 2022-05-25 | Stop reason: ALTCHOICE

## 2021-08-30 NOTE — PROGRESS NOTES
Patient ID: Rajesh Santos is a 80 y o  female  HPI: 80 y  o female presents for follow up of niddm, hypertension and hypercholesterolemia  Hgba1c is   5 2 and patient's issues are well controlled  Patient deneis any dyspnea, chest pain or palpitations  Over [address]  [address] weekend, she had a few cigarettes now she is fighting a daily craving to smoke  She denies any edema, increased water weight  SUBJECTIVE    Family History   Problem Relation Age of Onset    Hypertension Father         essential     Heart disease Father     Kidney disease Mother     Diabetes Mother      Social History     Socioeconomic History    Marital status:      Spouse name: Not on file    Number of children: 3    Years of education: less than high school    Highest education level: Not on file   Occupational History    Not on file   Tobacco Use    Smoking status: Former Smoker     Quit date: 3/1/2019     Years since quittin 5    Smokeless tobacco: Never Used   Substance and Sexual Activity    Alcohol use: No    Drug use: Never    Sexual activity: Not Currently   Other Topics Concern    Not on file   Social History Narrative    Daily coffee consumption:    Daily cola consumption:     Tea    Water intake, adequate (per day)     Social Determinants of Health     Financial Resource Strain:     Difficulty of Paying Living Expenses:    Food Insecurity:     Worried About Running Out of Food in the Last Year:     Ran Out of Food in the Last Year:    Transportation Needs:     Lack of Transportation (Medical):      Lack of Transportation (Non-Medical):    Physical Activity:     Days of Exercise per Week:     Minutes of Exercise per Session:    Stress:     Feeling of Stress :    Social Connections:     Frequency of Communication with Friends and Family:     Frequency of Social Gatherings with Friends and Family:     Attends Ep[employer] Services:     Active Member of Clubs or Organizations:     Attends Club or Organization Meetings:     Marital Status:    Intimate Partner Violence:     Fear of Current or Ex-Partner:     Emotionally Abused:     Physically Abused:     Sexually Abused:      Past Medical History:   Diagnosis Date    Coronary artery disease     Hyperlipidemia     Hypertension     Peripheral vascular disease ([facility] )      Past Surgical History:   Procedure Laterality Date    CARDIAC SURGERY      HYSTERECTOMY      age 32   [facility] OOPHORECTOMY Right     age 32   [facility] SMALL INTESTINE SURGERY      6 inches    TONSILLECTOMY      US GUIDED BREAST BIOPSY RIGHT COMPLETE Right 4/24/2019     No Known Allergies    Current Outpatient Medications:     acetaminophen (TYLENOL) 500 mg tablet, Take 500 mg by mouth every 6 (six) hours, Disp: , Rfl:     albuterol (2 5 mg/3 mL) 0 083 % nebulizer solution, inhale contents of 1 vial in nebulizer every 6 hours if needed, Disp: 300 mL, Rfl: 3    albuterol (PROVENTIL HFA,VENTOLIN HFA) 90 mcg/act inhaler, Inhale 2 puffs every 6 (six) hours as needed for wheezing or shortness of breath, Disp: 1 Inhaler, Rfl: 5    ALPRAZolam (XANAX) 0 25 mg tablet, take 1 tablet by mouth twice a day if needed for anxiety, Disp: 180 tablet, Rfl: 3    aspirin (ELISE LOW DOSE) 81 mg EC tablet, Take by mouth, Disp: , Rfl:     budesonide-formoterol (Symbicort) 160-4 5 mcg/act inhaler, Inhale 2 puffs 2 (two) times a day Rinse mouth after use , Disp: 6 g, Rfl: 3    calcitriol (ROCALTROL) 0 25 mcg capsule, Take 1 capsule (0 25 mcg total) by mouth 3 (three) times a week, Disp: 36 capsule, Rfl: 4    candesartan (ATACAND) 8 MG tablet, Take 1 tablet (8 mg total) by mouth daily at bedtime, Disp: 90 tablet, Rfl: 3    carvedilol (COREG) 12 5 mg tablet, Take 1 tablet (12 5 mg total) by mouth 2 (two) times a day with meals, Disp: 180 tablet, Rfl: 3    cholecalciferol (VITAMIN D3) 1,000 units tablet, Take 1,000 Units by mouth daily, Disp: , Rfl:     clopidogrel (PLAVIX) 75 mg tablet, Take 75 mg by mouth daily , Disp: , Rfl: 0    Diclofenac Sodium (VOLTAREN) 1 %, Apply 2 g topically 4 (four) times a day, Disp: 100 g, Rfl: 3    escitalopram (LEXAPRO) 10 mg tablet, Take 1 tablet (10 mg total) by mouth daily, Disp: 30 tablet, Rfl: 5    fluticasone (FLONASE) 50 mcg/act nasal spray, 1 spray into each nostril daily, Disp: 16 g, Rfl: 0    HYDROcodone-acetaminophen (NORCO) 5-325 mg per tablet, Take 1 tablet by mouth every 6 (six) hours as needed for painMax Daily Amount: 4 tablets, Disp: 60 tablet, Rfl: 0    levothyroxine 125 mcg tablet, Take 1 tablet (125 mcg total) by mouth daily in the early morning, Disp: 30 tablet, Rfl: 3    niacin (NIASPAN) 1000 MG CR tablet, Take 1,000 mg by mouth, Disp: , Rfl:     pantoprazole (PROTONIX) 40 mg tablet, TAKE 1 TABLET BY MOUTH 2 TIMES DAILY (Patient taking differently: Take 40 mg by mouth daily ), Disp: 180 tablet, Rfl: 3    rosuvastatin (CRESTOR) 40 MG tablet, Take 40 mg by mouth daily, Disp: , Rfl:     tiotropium (Spiriva HandiHaler) 18 mcg inhalation capsule, Place 1 capsule (18 mcg total) into inhaler and inhale daily, Disp: 1 each, Rfl: 3    torsemide (DEMADEX) 20 mg tablet, Take 1 tablet (20 mg total) by mouth daily, Disp: 90 tablet, Rfl: 3    varenicline (CHANTIX MELVA) 0 5 MG X 11 & 1 MG X 42 tablet, Take one 0 5 mg tablet by mouth once daily for 3 days, then one 0 5 mg tablet by mouth twice daily for 4 days, then one 1 mg tablet by mouth twice daily  , Disp: 53 tablet, Rfl: 0    zolpidem (AMBIEN CR) 6 25 MG CR tablet, Take 1 tablet (6 25 mg total) by mouth daily at bedtime as needed for sleep, Disp: 30 tablet, Rfl: 0    varenicline (CHANTIX MELVA) 0 5 MG X 11 & 1 MG X 42 tablet, Take one 0 5 mg tablet by mouth once daily for 3 days, then one 0 5 mg tablet by mouth twice daily for 4 days, then one 1 mg tablet by mouth twice daily  , Disp: 53 tablet, Rfl: 0    Review of Systems  Constitutional:     Denies fever, chills ,fatigue ,weakness ,weight loss, weight gain     ENT: Denies earache ,loss of hearing ,nosebleed, nasal discharge,nasal congestion ,sore throat ,hoarseness  Pulmonary: Denies shortness of breath ,cough  ,dyspnea on exertion, orthopnea  ,PND   Cardiovascular:  Denies bradycardia , tachycardia  ,palpations, lower extremity edema leg, claudication  Breast:  Denies new or changing breast lumps ,nipple discharge ,nipple changes  Abdomen:  Denies abdominal pain , anorexia , indigestion, nausea, vomiting, constipation, diarrhea  Musculoskeletal: Denies myalgias, arthralgias, joint swelling, joint stiffness , limb pain, limb swelling  Gu: denies dysuria, polyuria  Skin: Denies skin rash, skin lesion, skin wound, itching, dry skin  Neuro: Denies headache, numbness, tingling, confusion, loss of consciousness, dizziness, vertigo  Psychiatric: Denies feelings of depression, suicidal ideation, anxiety, sleep disturbances    OBJECTIVE  /78   Pulse 71   Temp 98 1 °F (36 7 °C)   Ht 5' 4" (1 626 m)   Wt 82 6 kg (182 lb)   SpO2 96%   BMI 31 24 kg/m²   Constitutional:   NAD, well appearing and well nourished      ENT:   Conjunctiva and lids: no injection, edema, or discharge     Pupils and iris: MILADY bilaterally    External inspection of ears and nose: normal without deformities or discharge  Otoscopic exam: Canals patent without erythema  Nasal mucosa, septum and turbinates: Normal or edema or discharge         Oropharynx:  Moist mucosa, normal tongue and tonsils without lesions  No erythema        Pulmonary:Respiratory effort normal rate and rhythm, no increased work of breathing   Auscultation of lungs:  Clear bilaterally with no adventitious breath sounds       Cardiovascular: regular rate and rhythm, S1 and S2, no murmur, no edema and/or varicosities of LE     Abdomen: Soft and non-distended     Positive bowel sounds      No heptomegaly or splenomegaly      Gu: no suprapubic tenderness or CVA tenderness, no urethral discharge  Lymphatic:  No anterior or posterior cervical lymphadenopathy         Musculoskeletal:  Gait and station: Normal gait      Digits and nails normal without clubbing or cyanosis       Inspection/palpation of joints, bones, and muscles:  No joint tenderness, swelling, full active and passive range of motion       Skin: Normal skin turgor and no rashes      Neuro:    Normal reflexes     Psych:   alert and oriented to person, place and time     normal mood and affect       Assessment/Plan:Diagnoses and all orders for this visit:    Type 2 diabetes mellitus with diabetic polyneuropathy, without long-term current use of insulin ([facility])  -     POCT hemoglobin A1c    Atrial fibrillation, unspecified type ([facility])    Tobacco abuse  -     varenicline (CHANTIX MELVA) 0 5 MG X 11 & 1 MG X 42 tablet; Take one 0 5 mg tablet by mouth once daily for 3 days, then one 0 5 mg tablet by mouth twice daily for 4 days, then one 1 mg tablet by mouth twice daily  Essential hypertension  Comments:  Continue with coreg tx  Hypercholesterolemia  Comments:  Continue with statin thearpy  Acute on chronic diastolic congestive heart failure ([facility] [address] )  Comments:  Continue with low sodium diet and diuretics  Centrilobular emphysema ([facility] )  Comments:  Continue current meds; discussed in detail with pt why she CANNOT  smoke  Other orders  -     Cancel: POCT hemoglobin A1c        Reviewed with patient plan to treat with above plan      Patient instructed to call in 72 hours if not feeling better or if symptoms worsen

## 2021-09-15 DIAGNOSIS — G47.00 INSOMNIA, UNSPECIFIED TYPE: ICD-10-CM

## 2021-09-15 DIAGNOSIS — M54.16 LUMBAR RADICULOPATHY: ICD-10-CM

## 2021-09-15 RX ORDER — ZOLPIDEM TARTRATE 6.25 MG/1
6.25 TABLET, FILM COATED, EXTENDED RELEASE ORAL
Qty: 30 TABLET | Refills: 0 | Status: SHIPPED | OUTPATIENT
Start: 2021-09-15 | End: 2021-10-14 | Stop reason: SDUPTHER

## 2021-09-15 RX ORDER — HYDROCODONE BITARTRATE AND ACETAMINOPHEN 5; 325 MG/1; MG/1
1 TABLET ORAL EVERY 6 HOURS PRN
Qty: 60 TABLET | Refills: 0 | Status: SHIPPED | OUTPATIENT
Start: 2021-09-15 | End: 2021-10-22 | Stop reason: SDUPTHER

## 2021-09-15 NOTE — TELEPHONE ENCOUNTER
08/17/2021 1 08/17/2021   ZOLPIDEM TART ER 6 25 MG TAB  30 0 30 [facility]   [id number]  [facility] ([id number]) 0  Medicare [place]    08/05/2021 1 08/05/2021   HYDROCODONE-ACETAMIN 5-325 MG  60 0 15 [facility]   [id number]  [facility] ([id number]) 0 20 0 MME Medicare [place]    07/14/2021 1 07/14/2021   ZOLPIDEM TART ER 6 25 MG TAB  30 0 30 [facility]   [id number]  [facility] ([id number]) 0  Medicare [place]

## 2021-10-14 DIAGNOSIS — G47.00 INSOMNIA, UNSPECIFIED TYPE: ICD-10-CM

## 2021-10-14 RX ORDER — ZOLPIDEM TARTRATE 6.25 MG/1
6.25 TABLET, FILM COATED, EXTENDED RELEASE ORAL
Qty: 30 TABLET | Refills: 0 | Status: SHIPPED | OUTPATIENT
Start: 2021-10-14 | End: 2021-11-16 | Stop reason: SDUPTHER

## 2021-10-22 DIAGNOSIS — M54.16 LUMBAR RADICULOPATHY: ICD-10-CM

## 2021-10-22 RX ORDER — HYDROCODONE BITARTRATE AND ACETAMINOPHEN 5; 325 MG/1; MG/1
1 TABLET ORAL EVERY 6 HOURS PRN
Qty: 60 TABLET | Refills: 0 | Status: SHIPPED | OUTPATIENT
Start: 2021-10-22 | End: 2021-12-01 | Stop reason: SDUPTHER

## 2021-11-04 ENCOUNTER — APPOINTMENT (OUTPATIENT)
Dept: LAB | Facility: CLINIC | Age: 81
End: 2021-11-04
Payer: COMMERCIAL

## 2021-11-04 DIAGNOSIS — E03.9 MYXEDEMA HEART DISEASE: ICD-10-CM

## 2021-11-04 DIAGNOSIS — I51.9 MYXEDEMA HEART DISEASE: ICD-10-CM

## 2021-11-04 LAB — TSH SERPL DL<=0.05 MIU/L-ACNC: <0.007 UIU/ML (ref 0.36–3.74)

## 2021-11-04 PROCEDURE — 84443 ASSAY THYROID STIM HORMONE: CPT

## 2021-11-04 PROCEDURE — 36415 COLL VENOUS BLD VENIPUNCTURE: CPT

## 2021-11-05 DIAGNOSIS — E03.9 HYPOTHYROIDISM, UNSPECIFIED TYPE: Primary | ICD-10-CM

## 2021-11-05 DIAGNOSIS — N25.81 SECONDARY HYPERPARATHYROIDISM OF RENAL ORIGIN (HCC): ICD-10-CM

## 2021-11-05 DIAGNOSIS — I10 ESSENTIAL HYPERTENSION: ICD-10-CM

## 2021-11-05 DIAGNOSIS — N18.31 CKD STAGE G3A/A1, GFR 45-59 AND ALBUMIN CREATININE RATIO <30 MG/G (HCC): ICD-10-CM

## 2021-11-05 DIAGNOSIS — E55.9 VITAMIN D DEFICIENCY: ICD-10-CM

## 2021-11-05 RX ORDER — LEVOTHYROXINE SODIUM 0.1 MG/1
100 TABLET ORAL
Qty: 90 TABLET | Refills: 3 | Status: SHIPPED | OUTPATIENT
Start: 2021-11-05 | End: 2021-11-08

## 2021-11-07 RX ORDER — CANDESARTAN 8 MG/1
8 TABLET ORAL
Qty: 90 TABLET | Refills: 3 | Status: SHIPPED | OUTPATIENT
Start: 2021-11-07 | End: 2021-11-17

## 2021-11-08 DIAGNOSIS — E03.9 HYPOTHYROIDISM, UNSPECIFIED TYPE: Primary | ICD-10-CM

## 2021-11-08 RX ORDER — LEVOTHYROXINE SODIUM 0.07 MG/1
75 TABLET ORAL
Qty: 30 TABLET | Refills: 5 | Status: SHIPPED | OUTPATIENT
Start: 2021-11-08

## 2021-11-10 ENCOUNTER — TELEPHONE (OUTPATIENT)
Dept: FAMILY MEDICINE CLINIC | Facility: CLINIC | Age: 81
End: 2021-11-10

## 2021-11-11 DIAGNOSIS — N18.31 STAGE 3A CHRONIC KIDNEY DISEASE (HCC): Primary | ICD-10-CM

## 2021-11-11 DIAGNOSIS — N25.81 SECONDARY HYPERPARATHYROIDISM OF RENAL ORIGIN (HCC): ICD-10-CM

## 2021-11-12 ENCOUNTER — APPOINTMENT (OUTPATIENT)
Dept: LAB | Facility: CLINIC | Age: 81
End: 2021-11-12
Payer: COMMERCIAL

## 2021-11-12 DIAGNOSIS — N18.31 STAGE 3A CHRONIC KIDNEY DISEASE (HCC): ICD-10-CM

## 2021-11-12 DIAGNOSIS — N25.81 SECONDARY HYPERPARATHYROIDISM OF RENAL ORIGIN (HCC): ICD-10-CM

## 2021-11-12 LAB
ALBUMIN SERPL BCP-MCNC: 3.5 G/DL (ref 3.5–5)
ANION GAP SERPL CALCULATED.3IONS-SCNC: 9 MMOL/L (ref 4–13)
BUN SERPL-MCNC: 19 MG/DL (ref 5–25)
CALCIUM SERPL-MCNC: 9.6 MG/DL (ref 8.3–10.1)
CHLORIDE SERPL-SCNC: 106 MMOL/L (ref 100–108)
CO2 SERPL-SCNC: 27 MMOL/L (ref 21–32)
CREAT SERPL-MCNC: 1.02 MG/DL (ref 0.6–1.3)
GFR SERPL CREATININE-BSD FRML MDRD: 52 ML/MIN/1.73SQ M
GLUCOSE P FAST SERPL-MCNC: 106 MG/DL (ref 65–99)
PHOSPHATE SERPL-MCNC: 2.9 MG/DL (ref 2.3–4.1)
POTASSIUM SERPL-SCNC: 4.1 MMOL/L (ref 3.5–5.3)
PTH-INTACT SERPL-MCNC: 111.6 PG/ML (ref 18.4–80.1)
SODIUM SERPL-SCNC: 142 MMOL/L (ref 136–145)

## 2021-11-12 PROCEDURE — 83970 ASSAY OF PARATHORMONE: CPT

## 2021-11-12 PROCEDURE — 36415 COLL VENOUS BLD VENIPUNCTURE: CPT

## 2021-11-12 PROCEDURE — 80069 RENAL FUNCTION PANEL: CPT

## 2021-11-15 ENCOUNTER — IMMUNIZATIONS (OUTPATIENT)
Dept: FAMILY MEDICINE CLINIC | Facility: CLINIC | Age: 81
End: 2021-11-15
Payer: COMMERCIAL

## 2021-11-15 DIAGNOSIS — Z23 ENCOUNTER FOR IMMUNIZATION: Primary | ICD-10-CM

## 2021-11-15 PROCEDURE — G0008 ADMIN INFLUENZA VIRUS VAC: HCPCS

## 2021-11-15 PROCEDURE — 90662 IIV NO PRSV INCREASED AG IM: CPT

## 2021-11-16 DIAGNOSIS — G47.00 INSOMNIA, UNSPECIFIED TYPE: ICD-10-CM

## 2021-11-16 RX ORDER — ZOLPIDEM TARTRATE 6.25 MG/1
6.25 TABLET, FILM COATED, EXTENDED RELEASE ORAL
Qty: 30 TABLET | Refills: 0 | Status: SHIPPED | OUTPATIENT
Start: 2021-11-16 | End: 2021-12-17 | Stop reason: SDUPTHER

## 2021-11-17 ENCOUNTER — OFFICE VISIT (OUTPATIENT)
Dept: NEPHROLOGY | Facility: CLINIC | Age: 81
End: 2021-11-17
Payer: COMMERCIAL

## 2021-11-17 VITALS
SYSTOLIC BLOOD PRESSURE: 158 MMHG | RESPIRATION RATE: 18 BRPM | BODY MASS INDEX: 31.38 KG/M2 | HEART RATE: 63 BPM | WEIGHT: 183.8 LBS | DIASTOLIC BLOOD PRESSURE: 72 MMHG | HEIGHT: 64 IN

## 2021-11-17 DIAGNOSIS — N18.31 CKD STAGE G3A/A1, GFR 45-59 AND ALBUMIN CREATININE RATIO <30 MG/G (HCC): ICD-10-CM

## 2021-11-17 DIAGNOSIS — N25.81 SECONDARY HYPERPARATHYROIDISM OF RENAL ORIGIN (HCC): ICD-10-CM

## 2021-11-17 DIAGNOSIS — I13.0 HYPERTENSIVE HEART AND CHRONIC KIDNEY DISEASE WITH HEART FAILURE AND STAGE 1 THROUGH STAGE 4 CHRONIC KIDNEY DISEASE, OR CHRONIC KIDNEY DISEASE (HCC): ICD-10-CM

## 2021-11-17 DIAGNOSIS — I10 PRIMARY HYPERTENSION: ICD-10-CM

## 2021-11-17 DIAGNOSIS — E55.9 VITAMIN D DEFICIENCY: ICD-10-CM

## 2021-11-17 DIAGNOSIS — E66.9 OBESITY (BMI 30.0-34.9): ICD-10-CM

## 2021-11-17 DIAGNOSIS — I10 ESSENTIAL HYPERTENSION: ICD-10-CM

## 2021-11-17 DIAGNOSIS — N18.31 STAGE 3A CHRONIC KIDNEY DISEASE (HCC): Primary | ICD-10-CM

## 2021-11-17 DIAGNOSIS — E78.00 HYPERCHOLESTEROLEMIA: ICD-10-CM

## 2021-11-17 PROCEDURE — 1036F TOBACCO NON-USER: CPT | Performed by: INTERNAL MEDICINE

## 2021-11-17 PROCEDURE — 3078F DIAST BP <80 MM HG: CPT | Performed by: INTERNAL MEDICINE

## 2021-11-17 PROCEDURE — 99214 OFFICE O/P EST MOD 30 MIN: CPT | Performed by: INTERNAL MEDICINE

## 2021-11-17 PROCEDURE — 3077F SYST BP >= 140 MM HG: CPT | Performed by: INTERNAL MEDICINE

## 2021-11-17 PROCEDURE — 1160F RVW MEDS BY RX/DR IN RCRD: CPT | Performed by: INTERNAL MEDICINE

## 2021-11-17 RX ORDER — CALCITRIOL 0.25 UG/1
0.25 CAPSULE, LIQUID FILLED ORAL DAILY
Qty: 90 CAPSULE | Refills: 3 | Status: SHIPPED | OUTPATIENT
Start: 2021-11-17

## 2021-11-17 RX ORDER — CANDESARTAN 16 MG/1
16 TABLET ORAL
Qty: 90 TABLET | Refills: 3 | Status: SHIPPED | OUTPATIENT
Start: 2021-11-17

## 2021-12-01 DIAGNOSIS — M54.16 LUMBAR RADICULOPATHY: ICD-10-CM

## 2021-12-01 RX ORDER — HYDROCODONE BITARTRATE AND ACETAMINOPHEN 5; 325 MG/1; MG/1
1 TABLET ORAL EVERY 6 HOURS PRN
Qty: 60 TABLET | Refills: 0 | Status: SHIPPED | OUTPATIENT
Start: 2021-12-01 | End: 2022-01-05 | Stop reason: SDUPTHER

## 2021-12-13 ENCOUNTER — TELEPHONE (OUTPATIENT)
Dept: FAMILY MEDICINE CLINIC | Facility: CLINIC | Age: 81
End: 2021-12-13

## 2021-12-15 ENCOUNTER — APPOINTMENT (OUTPATIENT)
Dept: LAB | Facility: CLINIC | Age: 81
End: 2021-12-15
Payer: COMMERCIAL

## 2021-12-15 DIAGNOSIS — E03.9 HYPOTHYROIDISM, UNSPECIFIED TYPE: ICD-10-CM

## 2021-12-15 LAB — TSH SERPL DL<=0.05 MIU/L-ACNC: 0.05 UIU/ML (ref 0.36–3.74)

## 2021-12-15 PROCEDURE — 36415 COLL VENOUS BLD VENIPUNCTURE: CPT

## 2021-12-15 PROCEDURE — 84443 ASSAY THYROID STIM HORMONE: CPT

## 2021-12-16 ENCOUNTER — TELEPHONE (OUTPATIENT)
Dept: FAMILY MEDICINE CLINIC | Facility: CLINIC | Age: 81
End: 2021-12-16

## 2021-12-16 DIAGNOSIS — E03.9 HYPOTHYROIDISM, UNSPECIFIED TYPE: Primary | ICD-10-CM

## 2021-12-17 DIAGNOSIS — G47.00 INSOMNIA, UNSPECIFIED TYPE: ICD-10-CM

## 2021-12-17 RX ORDER — ZOLPIDEM TARTRATE 6.25 MG/1
6.25 TABLET, FILM COATED, EXTENDED RELEASE ORAL
Qty: 30 TABLET | Refills: 0 | Status: SHIPPED | OUTPATIENT
Start: 2021-12-17 | End: 2022-01-19 | Stop reason: SDUPTHER

## 2021-12-28 DIAGNOSIS — N17.9 AKI (ACUTE KIDNEY INJURY) (HCC): ICD-10-CM

## 2021-12-28 DIAGNOSIS — I10 ESSENTIAL HYPERTENSION: ICD-10-CM

## 2021-12-28 DIAGNOSIS — N18.31 CKD STAGE G3A/A1, GFR 45-59 AND ALBUMIN CREATININE RATIO <30 MG/G (HCC): ICD-10-CM

## 2021-12-28 RX ORDER — TORSEMIDE 20 MG/1
20 TABLET ORAL DAILY
Qty: 90 TABLET | Refills: 3 | Status: SHIPPED | OUTPATIENT
Start: 2021-12-28 | End: 2022-05-25

## 2022-01-05 DIAGNOSIS — M54.16 LUMBAR RADICULOPATHY: ICD-10-CM

## 2022-01-05 DIAGNOSIS — N18.31 CKD STAGE G3A/A1, GFR 45-59 AND ALBUMIN CREATININE RATIO <30 MG/G (HCC): ICD-10-CM

## 2022-01-05 DIAGNOSIS — I10 ESSENTIAL HYPERTENSION: ICD-10-CM

## 2022-01-05 DIAGNOSIS — N17.9 AKI (ACUTE KIDNEY INJURY) (HCC): ICD-10-CM

## 2022-01-05 RX ORDER — CARVEDILOL 12.5 MG/1
12.5 TABLET ORAL 2 TIMES DAILY WITH MEALS
Qty: 180 TABLET | Refills: 3 | Status: SHIPPED | OUTPATIENT
Start: 2022-01-05 | End: 2023-01-05

## 2022-01-05 NOTE — TELEPHONE ENCOUNTER
12/28/2021  1  07/14/2021  ALPRAZOLAM 0 25 MG TABLET  180 0  90  [facility]  [id number]  [facility] ([id number])  1   Medicare [place]    12/17/2021  1  12/17/2021  ZOLPIDEM TART ER 6 25 MG TAB  30 0  30  [facility]  [id number]  [facility] ([id number])  0   Medicare PA    12/01/2021  1  12/01/2021  HYDROCODONE-ACETAMIN 5-325 MG  60 0  15  [facility]  [id number]  [facility] ([id number])  0  20 0 MME  Medicare PA

## 2022-01-06 RX ORDER — HYDROCODONE BITARTRATE AND ACETAMINOPHEN 5; 325 MG/1; MG/1
1 TABLET ORAL EVERY 6 HOURS PRN
Qty: 60 TABLET | Refills: 0 | Status: SHIPPED | OUTPATIENT
Start: 2022-01-06 | End: 2022-03-09 | Stop reason: SDUPTHER

## 2022-01-19 ENCOUNTER — OFFICE VISIT (OUTPATIENT)
Dept: FAMILY MEDICINE CLINIC | Facility: CLINIC | Age: 82
End: 2022-01-19
Payer: COMMERCIAL

## 2022-01-19 VITALS
BODY MASS INDEX: 30.9 KG/M2 | SYSTOLIC BLOOD PRESSURE: 126 MMHG | HEART RATE: 68 BPM | TEMPERATURE: 98.4 F | HEIGHT: 64 IN | DIASTOLIC BLOOD PRESSURE: 80 MMHG | WEIGHT: 181 LBS

## 2022-01-19 DIAGNOSIS — N25.81 SECONDARY HYPERPARATHYROIDISM OF RENAL ORIGIN (HCC): ICD-10-CM

## 2022-01-19 DIAGNOSIS — I13.0 HYPERTENSIVE HEART AND CHRONIC KIDNEY DISEASE WITH HEART FAILURE AND STAGE 1 THROUGH STAGE 4 CHRONIC KIDNEY DISEASE, OR CHRONIC KIDNEY DISEASE (HCC): ICD-10-CM

## 2022-01-19 DIAGNOSIS — I48.91 ATRIAL FIBRILLATION, UNSPECIFIED TYPE (HCC): ICD-10-CM

## 2022-01-19 DIAGNOSIS — F32.9 REACTIVE DEPRESSION: ICD-10-CM

## 2022-01-19 DIAGNOSIS — J44.9 CHRONIC OBSTRUCTIVE PULMONARY DISEASE, UNSPECIFIED COPD TYPE (HCC): ICD-10-CM

## 2022-01-19 DIAGNOSIS — G47.00 INSOMNIA, UNSPECIFIED TYPE: ICD-10-CM

## 2022-01-19 DIAGNOSIS — N18.31 CKD STAGE G3A/A1, GFR 45-59 AND ALBUMIN CREATININE RATIO <30 MG/G (HCC): ICD-10-CM

## 2022-01-19 DIAGNOSIS — J30.89 ALLERGIC RHINITIS DUE TO OTHER ALLERGIC TRIGGER, UNSPECIFIED SEASONALITY: ICD-10-CM

## 2022-01-19 DIAGNOSIS — E78.00 HYPERCHOLESTEROLEMIA: ICD-10-CM

## 2022-01-19 DIAGNOSIS — I73.9 PAD (PERIPHERAL ARTERY DISEASE) (HCC): ICD-10-CM

## 2022-01-19 DIAGNOSIS — I10 ESSENTIAL HYPERTENSION: ICD-10-CM

## 2022-01-19 DIAGNOSIS — E03.9 HYPOTHYROIDISM, UNSPECIFIED TYPE: ICD-10-CM

## 2022-01-19 DIAGNOSIS — F11.20 CONTINUOUS OPIOID DEPENDENCE (HCC): ICD-10-CM

## 2022-01-19 DIAGNOSIS — E55.9 VITAMIN D DEFICIENCY: ICD-10-CM

## 2022-01-19 DIAGNOSIS — E13.22 OTHER SPECIFIED DIABETES MELLITUS WITH DIABETIC CHRONIC KIDNEY DISEASE, UNSPECIFIED CKD STAGE, UNSPECIFIED WHETHER LONG TERM INSULIN USE (HCC): Primary | ICD-10-CM

## 2022-01-19 LAB — SL AMB POCT HEMOGLOBIN AIC: 6.2 (ref ?–6.5)

## 2022-01-19 PROCEDURE — 99214 OFFICE O/P EST MOD 30 MIN: CPT | Performed by: FAMILY MEDICINE

## 2022-01-19 PROCEDURE — 3074F SYST BP LT 130 MM HG: CPT | Performed by: FAMILY MEDICINE

## 2022-01-19 PROCEDURE — 3079F DIAST BP 80-89 MM HG: CPT | Performed by: FAMILY MEDICINE

## 2022-01-19 PROCEDURE — 1036F TOBACCO NON-USER: CPT | Performed by: FAMILY MEDICINE

## 2022-01-19 PROCEDURE — 1160F RVW MEDS BY RX/DR IN RCRD: CPT | Performed by: FAMILY MEDICINE

## 2022-01-19 PROCEDURE — 83036 HEMOGLOBIN GLYCOSYLATED A1C: CPT | Performed by: FAMILY MEDICINE

## 2022-01-19 RX ORDER — ZOLPIDEM TARTRATE 6.25 MG/1
6.25 TABLET, FILM COATED, EXTENDED RELEASE ORAL
Qty: 30 TABLET | Refills: 0 | Status: SHIPPED | OUTPATIENT
Start: 2022-01-19 | End: 2022-02-18 | Stop reason: SDUPTHER

## 2022-01-19 RX ORDER — ALBUTEROL SULFATE 90 UG/1
2 AEROSOL, METERED RESPIRATORY (INHALATION) EVERY 6 HOURS PRN
Qty: 8 G | Refills: 3 | Status: SHIPPED | OUTPATIENT
Start: 2022-01-19 | End: 2022-05-23 | Stop reason: SDUPTHER

## 2022-01-19 RX ORDER — LORATADINE 10 MG/1
10 TABLET ORAL DAILY
COMMUNITY

## 2022-01-19 RX ORDER — FLUTICASONE PROPIONATE 50 MCG
1 SPRAY, SUSPENSION (ML) NASAL DAILY
Qty: 16 G | Refills: 0 | Status: SHIPPED | OUTPATIENT
Start: 2022-01-19 | End: 2022-03-22 | Stop reason: SDUPTHER

## 2022-01-19 RX ORDER — ESCITALOPRAM OXALATE 10 MG/1
10 TABLET ORAL DAILY
Qty: 30 TABLET | Refills: 5 | Status: SHIPPED | OUTPATIENT
Start: 2022-01-19 | End: 2022-07-25 | Stop reason: SDUPTHER

## 2022-01-20 NOTE — PROGRESS NOTES
Patient ID: Akash Crowley is a 80 y o  female  HPI: 80 y  o female presents for follow up of niddm, hypertension, hypothyroidism, vit D tx, copd,depression  afib  and hypercholesterolemia  Hgba1c is   6 2  and patient's issues are well controlled  Patient deneis any dyspnea, chest pain or palpitations  SUBJECTIVE    Family History   Problem Relation Age of Onset    Hypertension Father         essential     Heart disease Father     Kidney disease Mother     Diabetes Mother      Social History     Socioeconomic History    Marital status:       Spouse name: Not on file    Number of children: 3    Years of education: less than high school    Highest education level: Not on file   Occupational History    Not on file   Tobacco Use    Smoking status: Former Smoker     Quit date: 3/1/2019     Years since quittin 8    Smokeless tobacco: Never Used   Substance and Sexual Activity    Alcohol use: No    Drug use: Never    Sexual activity: Not Currently   Other Topics Concern    Not on file   Social History Narrative    Daily coffee consumption:    Daily cola consumption:     Tea    Water intake, adequate (per day)     Social Determinants of Health     Financial Resource Strain: Not on file   Food Insecurity: Not on file   Transportation Needs: Not on file   Physical Activity: Not on file   Stress: Not on file   Social Connections: Not on file   Intimate Partner Violence: Not on file   Housing Stability: Not on file     Past Medical History:   Diagnosis Date    Coronary artery disease     Hyperlipidemia     Hypertension     Peripheral vascular disease ([address] )      Past Surgical History:   Procedure Laterality Date    CARDIAC SURGERY      HYSTERECTOMY      age 32   [facility] OOPHORECTOMY Right     age 32   [facility] SMALL INTESTINE SURGERY      6 inches    TONSILLECTOMY      US GUIDED BREAST BIOPSY RIGHT COMPLETE Right 2019     No Known Allergies    Current Outpatient Medications:     acetaminophen (TYLENOL) 500 mg tablet, Take 500 mg by mouth every 6 (six) hours, Disp: , Rfl:     albuterol (2 5 mg/3 mL) 0 083 % nebulizer solution, inhale contents of 1 vial in nebulizer every 6 hours if needed, Disp: 300 mL, Rfl: 3    albuterol (PROVENTIL HFA,VENTOLIN HFA) 90 mcg/act inhaler, Inhale 2 puffs every 6 (six) hours as needed for wheezing or shortness of breath, Disp: 8 g, Rfl: 3    ALPRAZolam (XANAX) 0 25 mg tablet, take 1 tablet by mouth twice a day if needed for anxiety, Disp: 180 tablet, Rfl: 3    aspirin (ELISE LOW DOSE) 81 mg EC tablet, Take by mouth, Disp: , Rfl:     budesonide-formoterol (Symbicort) 160-4 5 mcg/act inhaler, Inhale 2 puffs 2 (two) times a day Rinse mouth after use , Disp: 6 g, Rfl: 3    calcitriol (ROCALTROL) 0 25 mcg capsule, Take 1 capsule (0 25 mcg total) by mouth daily, Disp: 90 capsule, Rfl: 3    candesartan (ATACAND) 16 mg tablet, Take 1 tablet (16 mg total) by mouth daily at bedtime, Disp: 90 tablet, Rfl: 3    carvedilol (COREG) 12 5 mg tablet, Take 1 tablet (12 5 mg total) by mouth 2 (two) times a day with meals, Disp: 180 tablet, Rfl: 3    cholecalciferol (VITAMIN D3) 1,000 units tablet, Take 1,000 Units by mouth daily, Disp: , Rfl:     clopidogrel (PLAVIX) 75 mg tablet, Take 75 mg by mouth daily , Disp: , Rfl: 0    dextromethorphan-guaifenesin (MUCINEX DM)  MG per 12 hr tablet, Take 1 tablet by mouth every 12 (twelve) hours, Disp: , Rfl:     Diclofenac Sodium (VOLTAREN) 1 %, Apply 2 g topically 4 (four) times a day, Disp: 100 g, Rfl: 3    escitalopram (LEXAPRO) 10 mg tablet, Take 1 tablet (10 mg total) by mouth daily, Disp: 30 tablet, Rfl: 5    fluticasone (FLONASE) 50 mcg/act nasal spray, 1 spray into each nostril daily, Disp: 16 g, Rfl: 0    HYDROcodone-acetaminophen (NORCO) 5-325 mg per tablet, Take 1 tablet by mouth every 6 (six) hours as needed for pain Max Daily Amount: 4 tablets, Disp: 60 tablet, Rfl: 0    levothyroxine (Synthroid) 75 mcg tablet, Take 1 tablet (75 mcg total) by mouth daily in the early morning, Disp: 30 tablet, Rfl: 5    levothyroxine 125 mcg tablet, Take 1 tablet (125 mcg total) by mouth daily in the early morning (Patient taking differently: Take 100 mcg by mouth daily in the early morning  ), Disp: 30 tablet, Rfl: 3    loratadine (Allergy) 10 mg tablet, Take 10 mg by mouth daily, Disp: , Rfl:     niacin (NIASPAN) 1000 MG CR tablet, Take 1,000 mg by mouth, Disp: , Rfl:     pantoprazole (PROTONIX) 40 mg tablet, TAKE 1 TABLET BY MOUTH 2 TIMES DAILY (Patient taking differently: Take 40 mg by mouth daily ), Disp: 180 tablet, Rfl: 3    rosuvastatin (CRESTOR) 40 MG tablet, Take 40 mg by mouth daily, Disp: , Rfl:     tiotropium (Spiriva HandiHaler) 18 mcg inhalation capsule, Place 1 capsule (18 mcg total) into inhaler and inhale daily, Disp: 1 each, Rfl: 3    torsemide (DEMADEX) 20 mg tablet, Take 1 tablet (20 mg total) by mouth daily, Disp: 90 tablet, Rfl: 3    varenicline (CHANTIX MELVA) 0 5 MG X 11 & 1 MG X 42 tablet, Take one 0 5 mg tablet by mouth once daily for 3 days, then one 0 5 mg tablet by mouth twice daily for 4 days, then one 1 mg tablet by mouth twice daily  , Disp: 53 tablet, Rfl: 0    zolpidem (AMBIEN CR) 6 25 MG CR tablet, Take 1 tablet (6 25 mg total) by mouth daily at bedtime as needed for sleep, Disp: 30 tablet, Rfl: 0    Review of Systems  Constitutional:     Denies fever, chills ,fatigue ,weakness ,weight loss, weight gain     ENT: Denies earache ,loss of hearing ,nosebleed, nasal discharge,nasal congestion ,sore throat ,hoarseness  Pulmonary: Denies shortness of breath ,cough  ,dyspnea on exertion, orthopnea  ,PND   Cardiovascular:  Denies bradycardia , tachycardia  ,palpations, lower extremity edema leg, claudication  Breast:  Denies new or changing breast lumps ,nipple discharge ,nipple changes  Abdomen:  Denies abdominal pain , anorexia , indigestion, nausea, vomiting, constipation, diarrhea  Musculoskeletal: Denies myalgias, arthralgias, joint swelling, joint stiffness , limb pain, limb swelling  Gu: denies dysuria, polyuria  Skin: Denies skin rash, skin lesion, skin wound, itching, dry skin  Neuro: Denies headache, numbness, tingling, confusion, loss of consciousness, dizziness, vertigo  Psychiatric: Denies feelings of depression, suicidal ideation, anxiety, sleep disturbances    OBJECTIVE  /80   Pulse 68   Temp 98 4 °F (36 9 °C)   Ht 5' 4" (1 626 m)   Wt 82 1 kg (181 lb)   BMI 31 07 kg/m²   Constitutional:   NAD, well appearing and well nourished      ENT:   Conjunctiva and lids: no injection, edema, or discharge     Pupils and iris: MILADY bilaterally    External inspection of ears and nose: normal without deformities or discharge  Otoscopic exam: Canals patent without erythema  Nasal mucosa, septum and turbinates: Normal or edema or discharge         Oropharynx:  Moist mucosa, normal tongue and tonsils without lesions  No erythema        Pulmonary:Respiratory effort normal rate and rhythm, no increased work of breathing   Auscultation of lungs:  Clear bilaterally with no adventitious breath sounds       Cardiovascular: regular rate and irregular  rhythm, no murmur, no edema and/or varicosities of LE      Abdomen: Soft and non-distended     Positive bowel sounds      No heptomegaly or splenomegaly      Gu: no suprapubic tenderness or CVA tenderness, no urethral discharge  Lymphatic:  No anterior or posterior cervical lymphadenopathy         Musculoskeletal:  Gait and station: Normal gait      Digits and nails normal without clubbing or cyanosis       Inspection/palpation of joints, bones, and muscles:  No joint tenderness, swelling, full active and passive range of motion       Skin: Normal skin turgor and no rashes      Neuro:    Normal reflexes     Psych:   alert and oriented to person, place and time     normal mood and affect       Assessment/Plan:Diagnoses and all orders for this visit:    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, unspecified CKD stage, unspecified whether long term insulin use ([place] [address] )  Comments:  Continue with current therapy  Orders:  -     POCT hemoglobin A1c    Essential hypertension  Comments:  Contineu of ARB  Orders:  -     Comprehensive metabolic panel; Future    Hypercholesterolemia  Comments:  Continue wiht statin tx  Orders:  -     Lipid Panel with Direct LDL reflex; Future    Hypothyroidism, unspecified type  Comments:  Continue levothyroxine  Orders:  -     TSH, 3rd generation; Future    Vitamin D deficiency  -     Vitamin D 25 hydroxy; Future    Chronic obstructive pulmonary disease, unspecified COPD type ([place] [address] )  Comments:  continue wiht inhaler therapy  Orders:  -     albuterol (PROVENTIL HFA,VENTOLIN HFA) 90 mcg/act inhaler; Inhale 2 puffs every 6 (six) hours as needed for wheezing or shortness of breath    Continuous opioid dependence ([facility])    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, or chronic kidney disease (HCC)    PAD (peripheral artery disease) ([facility])    Atrial fibrillation, unspecified type ([place] [address] )  Comments:  Contineu anticoagulation  Secondary hyperparathyroidism of renal origin ([place] [address] )    CKD stage G3a/A1, GFR 45-59 and albumin creatinine ratio <30 mg/g ([facility])  Comments:  Continue monitoring renal function  Allergic rhinitis due to other allergic trigger, unspecified seasonality  -     fluticasone (FLONASE) 50 mcg/act nasal spray; 1 spray into each nostril daily    Insomnia, unspecified type  Comments:  Contineu prn zolpidem tx  Orders:  -     zolpidem (AMBIEN CR) 6 25 MG CR tablet; Take 1 tablet (6 25 mg total) by mouth daily at bedtime as needed for sleep    Reactive depression  Comments:  Contineu lexapro thearpy   Orders:  -     escitalopram (LEXAPRO) 10 mg tablet;  Take 1 tablet (10 mg total) by mouth daily    Other orders  -     loratadine (Allergy) 10 mg tablet; Take 10 mg by mouth daily  -     dextromethorphan-guaifenesin (MUCINEX DM)  MG per 12 hr tablet; Take 1 tablet by mouth every 12 (twelve) hours        I will see patient back in 4 mos or sooner prn

## 2022-01-26 ENCOUNTER — APPOINTMENT (OUTPATIENT)
Dept: LAB | Facility: CLINIC | Age: 82
End: 2022-01-26
Payer: COMMERCIAL

## 2022-01-26 DIAGNOSIS — E55.9 VITAMIN D DEFICIENCY: ICD-10-CM

## 2022-01-26 DIAGNOSIS — I10 ESSENTIAL HYPERTENSION: ICD-10-CM

## 2022-01-26 DIAGNOSIS — E78.00 HYPERCHOLESTEROLEMIA: ICD-10-CM

## 2022-01-26 DIAGNOSIS — E03.9 HYPOTHYROIDISM, UNSPECIFIED TYPE: ICD-10-CM

## 2022-01-26 LAB
25(OH)D3 SERPL-MCNC: 29.9 NG/ML (ref 30–100)
ALBUMIN SERPL BCP-MCNC: 3.8 G/DL (ref 3.5–5)
ALP SERPL-CCNC: 81 U/L (ref 46–116)
ALT SERPL W P-5'-P-CCNC: 27 U/L (ref 12–78)
ANION GAP SERPL CALCULATED.3IONS-SCNC: 3 MMOL/L (ref 4–13)
AST SERPL W P-5'-P-CCNC: 20 U/L (ref 5–45)
BILIRUB SERPL-MCNC: 0.35 MG/DL (ref 0.2–1)
BUN SERPL-MCNC: 23 MG/DL (ref 5–25)
CALCIUM SERPL-MCNC: 9.5 MG/DL (ref 8.3–10.1)
CHLORIDE SERPL-SCNC: 104 MMOL/L (ref 100–108)
CHOLEST SERPL-MCNC: 204 MG/DL
CO2 SERPL-SCNC: 31 MMOL/L (ref 21–32)
CREAT SERPL-MCNC: 1.06 MG/DL (ref 0.6–1.3)
GFR SERPL CREATININE-BSD FRML MDRD: 49 ML/MIN/1.73SQ M
GLUCOSE P FAST SERPL-MCNC: 102 MG/DL (ref 65–99)
HDLC SERPL-MCNC: 66 MG/DL
LDLC SERPL CALC-MCNC: 96 MG/DL (ref 0–100)
POTASSIUM SERPL-SCNC: 3.9 MMOL/L (ref 3.5–5.3)
PROT SERPL-MCNC: 7.6 G/DL (ref 6.4–8.2)
SODIUM SERPL-SCNC: 138 MMOL/L (ref 136–145)
TRIGL SERPL-MCNC: 211 MG/DL
TSH SERPL DL<=0.05 MIU/L-ACNC: 1.84 UIU/ML (ref 0.36–3.74)

## 2022-01-26 PROCEDURE — 82306 VITAMIN D 25 HYDROXY: CPT

## 2022-01-26 PROCEDURE — 80061 LIPID PANEL: CPT

## 2022-01-26 PROCEDURE — 80053 COMPREHEN METABOLIC PANEL: CPT

## 2022-01-26 PROCEDURE — 84443 ASSAY THYROID STIM HORMONE: CPT

## 2022-01-26 PROCEDURE — 36415 COLL VENOUS BLD VENIPUNCTURE: CPT

## 2022-02-18 DIAGNOSIS — G47.00 INSOMNIA, UNSPECIFIED TYPE: ICD-10-CM

## 2022-02-18 RX ORDER — ZOLPIDEM TARTRATE 6.25 MG/1
6.25 TABLET, FILM COATED, EXTENDED RELEASE ORAL
Qty: 30 TABLET | Refills: 0 | Status: SHIPPED | OUTPATIENT
Start: 2022-02-18 | End: 2022-03-22 | Stop reason: SDUPTHER

## 2022-03-01 DIAGNOSIS — E03.9 HYPOTHYROIDISM, UNSPECIFIED TYPE: ICD-10-CM

## 2022-03-01 RX ORDER — LEVOTHYROXINE SODIUM 0.1 MG/1
100 TABLET ORAL
Qty: 90 TABLET | Refills: 1 | Status: SHIPPED | OUTPATIENT
Start: 2022-03-01

## 2022-03-09 DIAGNOSIS — M54.16 LUMBAR RADICULOPATHY: ICD-10-CM

## 2022-03-09 RX ORDER — HYDROCODONE BITARTRATE AND ACETAMINOPHEN 5; 325 MG/1; MG/1
1 TABLET ORAL EVERY 6 HOURS PRN
Qty: 60 TABLET | Refills: 0 | Status: SHIPPED | OUTPATIENT
Start: 2022-03-09 | End: 2022-05-09 | Stop reason: SDUPTHER

## 2022-03-09 NOTE — TELEPHONE ENCOUNTER
1 [id number] 02/18/2022 02/18/2022 Zolpidem Tartrate 30 0 30 6 25 MG NA [address] [facility] Medicare 00 / 00 [place]    1 [id number] 01/19/2022 01/19/2022 Zolpidem Tartrate 30 0 30 6 25 MG NA [address] Medicare 00 / 00 [place]    1 [id number] 01/06/2022 01/06/2022 HYDROcodone BITARTRATE-ACETAMINOPHE 60 0 15 325 MG-5 MG 20 0 SALONI BROADT [facility] Medicare 00 / 00 [place]

## 2022-03-22 DIAGNOSIS — J30.89 ALLERGIC RHINITIS DUE TO OTHER ALLERGIC TRIGGER, UNSPECIFIED SEASONALITY: ICD-10-CM

## 2022-03-22 DIAGNOSIS — G47.00 INSOMNIA, UNSPECIFIED TYPE: ICD-10-CM

## 2022-03-22 DIAGNOSIS — K21.9 GASTROESOPHAGEAL REFLUX DISEASE WITHOUT ESOPHAGITIS: ICD-10-CM

## 2022-03-22 RX ORDER — ZOLPIDEM TARTRATE 6.25 MG/1
6.25 TABLET, FILM COATED, EXTENDED RELEASE ORAL
Qty: 30 TABLET | Refills: 0 | Status: SHIPPED | OUTPATIENT
Start: 2022-03-22 | End: 2022-04-21 | Stop reason: SDUPTHER

## 2022-03-22 RX ORDER — FLUTICASONE PROPIONATE 50 MCG
1 SPRAY, SUSPENSION (ML) NASAL DAILY
Qty: 16 G | Refills: 0 | Status: SHIPPED | OUTPATIENT
Start: 2022-03-22 | End: 2022-05-23 | Stop reason: SDUPTHER

## 2022-03-22 RX ORDER — PANTOPRAZOLE SODIUM 40 MG/1
40 TABLET, DELAYED RELEASE ORAL 2 TIMES DAILY
Qty: 180 TABLET | Refills: 3 | Status: SHIPPED | OUTPATIENT
Start: 2022-03-22

## 2022-03-22 NOTE — TELEPHONE ENCOUNTER
[id number] 03/09/2022 03/09/2022 HYDROcodone BITARTRATE-ACETAMINOPHE (Tablet)  60 0 15 325 MG-5 MG 20 0 [address]  [facility] [employer]  Medicare 00 / 00 [place]    1  [id number] 02/18/2022 02/18/2022 Zolpidem Tartrate (Tablet, Extended Release)  30 0 30 6 25 MG NA [id number] Medicare 00 / 00 [place]      Pt is requesting refill for Zolpidem and 2 other meds  Sent to provider review

## 2022-04-21 DIAGNOSIS — G47.00 INSOMNIA, UNSPECIFIED TYPE: ICD-10-CM

## 2022-04-21 RX ORDER — ZOLPIDEM TARTRATE 6.25 MG/1
6.25 TABLET, FILM COATED, EXTENDED RELEASE ORAL
Qty: 30 TABLET | Refills: 0 | Status: SHIPPED | OUTPATIENT
Start: 2022-04-21 | End: 2022-05-23 | Stop reason: SDUPTHER

## 2022-04-21 NOTE — TELEPHONE ENCOUNTER
03/22/2022 03/22/2022 Zolpidem Tartrate (Tablet, Extended Release)  30 0 30 6 25 MG NA SALONI CHAUDHRY

## 2022-05-09 DIAGNOSIS — M54.16 LUMBAR RADICULOPATHY: ICD-10-CM

## 2022-05-09 DIAGNOSIS — F41.9 ANXIETY: ICD-10-CM

## 2022-05-09 RX ORDER — HYDROCODONE BITARTRATE AND ACETAMINOPHEN 5; 325 MG/1; MG/1
1 TABLET ORAL EVERY 6 HOURS PRN
Qty: 60 TABLET | Refills: 0 | Status: SHIPPED | OUTPATIENT
Start: 2022-05-09 | End: 2022-07-01 | Stop reason: SDUPTHER

## 2022-05-09 RX ORDER — ALPRAZOLAM 0.25 MG/1
TABLET ORAL
Qty: 180 TABLET | Refills: 3 | Status: SHIPPED | OUTPATIENT
Start: 2022-05-09

## 2022-05-09 NOTE — TELEPHONE ENCOUNTER
1  [id number] 04/21/2022 04/21/2022 Zolpidem Tartrate (Tablet, Extended Release)  30 0 30 6 25 MG NA SALONI BROADT  [facility] Medicare 00 / 00 [place]    1  [id number] 03/22/2022 03/22/2022 Zolpidem Tartrate (Tablet, Extended Release)  30 0 30 6 25 MG NA SALONI BROADT  [facility] Medicare 00 / 00 [place]    1  [id number] 03/09/2022 03/09/2022 HYDROcodone BITARTRATE-ACETAMINOPHE (Tablet)  60 0 15 325 MG-5 MG 20 0 SALONI BROADT  [facility] Medicare 00 / 00 [place]

## 2022-05-23 DIAGNOSIS — J44.9 CHRONIC OBSTRUCTIVE PULMONARY DISEASE, UNSPECIFIED COPD TYPE (HCC): ICD-10-CM

## 2022-05-23 DIAGNOSIS — G47.00 INSOMNIA, UNSPECIFIED TYPE: ICD-10-CM

## 2022-05-23 DIAGNOSIS — J30.89 ALLERGIC RHINITIS DUE TO OTHER ALLERGIC TRIGGER, UNSPECIFIED SEASONALITY: ICD-10-CM

## 2022-05-24 RX ORDER — ALBUTEROL SULFATE 90 UG/1
2 AEROSOL, METERED RESPIRATORY (INHALATION) EVERY 6 HOURS PRN
Qty: 8 G | Refills: 3 | Status: SHIPPED | OUTPATIENT
Start: 2022-05-24 | End: 2022-05-25 | Stop reason: SDUPTHER

## 2022-05-24 RX ORDER — ZOLPIDEM TARTRATE 6.25 MG/1
6.25 TABLET, FILM COATED, EXTENDED RELEASE ORAL
Qty: 30 TABLET | Refills: 0 | Status: SHIPPED | OUTPATIENT
Start: 2022-05-24 | End: 2022-06-21 | Stop reason: SDUPTHER

## 2022-05-24 RX ORDER — TIOTROPIUM BROMIDE 18 UG/1
18 CAPSULE ORAL; RESPIRATORY (INHALATION) DAILY
Qty: 30 CAPSULE | Refills: 3 | Status: SHIPPED | OUTPATIENT
Start: 2022-05-24

## 2022-05-24 RX ORDER — FLUTICASONE PROPIONATE 50 MCG
1 SPRAY, SUSPENSION (ML) NASAL DAILY
Qty: 16 G | Refills: 0 | Status: SHIPPED | OUTPATIENT
Start: 2022-05-24 | End: 2022-06-21 | Stop reason: SDUPTHER

## 2022-05-25 ENCOUNTER — APPOINTMENT (OUTPATIENT)
Dept: LAB | Facility: CLINIC | Age: 82
End: 2022-05-25
Payer: COMMERCIAL

## 2022-05-25 ENCOUNTER — OFFICE VISIT (OUTPATIENT)
Dept: FAMILY MEDICINE CLINIC | Facility: CLINIC | Age: 82
End: 2022-05-25
Payer: COMMERCIAL

## 2022-05-25 VITALS
WEIGHT: 180 LBS | OXYGEN SATURATION: 94 % | DIASTOLIC BLOOD PRESSURE: 78 MMHG | HEIGHT: 64 IN | SYSTOLIC BLOOD PRESSURE: 150 MMHG | BODY MASS INDEX: 30.73 KG/M2 | TEMPERATURE: 97.8 F | HEART RATE: 68 BPM

## 2022-05-25 DIAGNOSIS — E55.9 VITAMIN D DEFICIENCY: ICD-10-CM

## 2022-05-25 DIAGNOSIS — E13.22 OTHER SPECIFIED DIABETES MELLITUS WITH DIABETIC CHRONIC KIDNEY DISEASE, UNSPECIFIED CKD STAGE, UNSPECIFIED WHETHER LONG TERM INSULIN USE (HCC): Primary | ICD-10-CM

## 2022-05-25 DIAGNOSIS — E03.9 HYPOTHYROIDISM, UNSPECIFIED TYPE: ICD-10-CM

## 2022-05-25 DIAGNOSIS — J44.9 CHRONIC OBSTRUCTIVE PULMONARY DISEASE, UNSPECIFIED COPD TYPE (HCC): ICD-10-CM

## 2022-05-25 DIAGNOSIS — I10 ESSENTIAL HYPERTENSION: ICD-10-CM

## 2022-05-25 DIAGNOSIS — Z78.0 ASYMPTOMATIC POSTMENOPAUSAL ESTROGEN DEFICIENCY: ICD-10-CM

## 2022-05-25 DIAGNOSIS — E78.00 HYPERCHOLESTEROLEMIA: ICD-10-CM

## 2022-05-25 LAB
25(OH)D3 SERPL-MCNC: 40.9 NG/ML (ref 30–100)
ALBUMIN SERPL BCP-MCNC: 3.7 G/DL (ref 3.5–5)
ALP SERPL-CCNC: 74 U/L (ref 46–116)
ALT SERPL W P-5'-P-CCNC: 25 U/L (ref 12–78)
ANION GAP SERPL CALCULATED.3IONS-SCNC: 2 MMOL/L (ref 4–13)
AST SERPL W P-5'-P-CCNC: 21 U/L (ref 5–45)
BILIRUB SERPL-MCNC: 1.16 MG/DL (ref 0.2–1)
BUN SERPL-MCNC: 31 MG/DL (ref 5–25)
CALCIUM SERPL-MCNC: 10.2 MG/DL (ref 8.3–10.1)
CHLORIDE SERPL-SCNC: 102 MMOL/L (ref 100–108)
CHOLEST SERPL-MCNC: 183 MG/DL
CO2 SERPL-SCNC: 34 MMOL/L (ref 21–32)
CREAT SERPL-MCNC: 1.22 MG/DL (ref 0.6–1.3)
GFR SERPL CREATININE-BSD FRML MDRD: 41 ML/MIN/1.73SQ M
GLUCOSE P FAST SERPL-MCNC: 102 MG/DL (ref 65–99)
HDLC SERPL-MCNC: 67 MG/DL
LDLC SERPL CALC-MCNC: 77 MG/DL (ref 0–100)
POTASSIUM SERPL-SCNC: 3.9 MMOL/L (ref 3.5–5.3)
PROT SERPL-MCNC: 7.8 G/DL (ref 6.4–8.2)
SL AMB POCT HEMOGLOBIN AIC: 6.4 (ref ?–6.5)
SODIUM SERPL-SCNC: 138 MMOL/L (ref 136–145)
TRIGL SERPL-MCNC: 194 MG/DL
TSH SERPL DL<=0.05 MIU/L-ACNC: 1.17 UIU/ML (ref 0.45–4.5)

## 2022-05-25 PROCEDURE — 84443 ASSAY THYROID STIM HORMONE: CPT

## 2022-05-25 PROCEDURE — 82306 VITAMIN D 25 HYDROXY: CPT

## 2022-05-25 PROCEDURE — 83036 HEMOGLOBIN GLYCOSYLATED A1C: CPT | Performed by: FAMILY MEDICINE

## 2022-05-25 PROCEDURE — 99214 OFFICE O/P EST MOD 30 MIN: CPT | Performed by: FAMILY MEDICINE

## 2022-05-25 PROCEDURE — 80061 LIPID PANEL: CPT

## 2022-05-25 PROCEDURE — 80053 COMPREHEN METABOLIC PANEL: CPT

## 2022-05-25 PROCEDURE — 36415 COLL VENOUS BLD VENIPUNCTURE: CPT

## 2022-05-25 RX ORDER — ALBUTEROL SULFATE 90 UG/1
2 AEROSOL, METERED RESPIRATORY (INHALATION) EVERY 6 HOURS PRN
Qty: 8 G | Refills: 3 | Status: SHIPPED | OUTPATIENT
Start: 2022-05-25

## 2022-06-01 NOTE — PROGRESS NOTES
Patient ID: Marichuy Calix is a 80 y o  female  HPI: 80 y  o female presents for follow up of niddm, hypertension ,hypothyroidism, copd, vit d deficiency and hypercholesterolemia  Hgba1c is    6 4  and patient's issues are well controlled  Patient deneis any dyspnea, chest pain or palpitations  BMI Counseling: Body mass index is 30 9 kg/m²  The BMI is above normal  Nutrition recommendations include reducing portion sizes and moderation in carbohydrate intake  SUBJECTIVE    Family History   Problem Relation Age of Onset    Hypertension Father         essential     Heart disease Father     Kidney disease Mother     Diabetes Mother      Social History     Socioeconomic History    Marital status:       Spouse name: Not on file    Number of children: 3    Years of education: less than high school    Highest education level: Not on file   Occupational History    Not on file   Tobacco Use    Smoking status: Former Smoker     Quit date: 3/1/2019     Years since quitting: 3 2    Smokeless tobacco: Never Used   Substance and Sexual Activity    Alcohol use: No    Drug use: Never    Sexual activity: Not Currently   Other Topics Concern    Not on file   Social History Narrative    Daily coffee consumption:    Daily cola consumption:     Tea    Water intake, adequate (per day)     Social Determinants of Health     Financial Resource Strain: Not on file   Food Insecurity: Not on file   Transportation Needs: Not on file   Physical Activity: Not on file   Stress: Not on file   Social Connections: Not on file   Intimate Partner Violence: Not on file   Housing Stability: Not on file     Past Medical History:   Diagnosis Date    Coronary artery disease     Hyperlipidemia     Hypertension     Peripheral [address] disease ([facility] [address] )      Past Surgical History:   Procedure Laterality Date    CARDIAC SURGERY      HYSTERECTOMY      age 32   [facility]OP[facility] Right     age 32   [facility] SMALL INTESTINE SURGERY      6 inches    TONSILLECTOMY      US GUIDED BREAST BIOPSY RIGHT COMPLETE Right 4/24/2019     No Known Allergies    Current Outpatient Medications:     acetaminophen (TYLENOL) 500 mg tablet, Take 500 mg by mouth every 6 (six) hours, Disp: , Rfl:     albuterol (2 5 mg/3 mL) 0 083 % nebulizer solution, inhale contents of 1 vial in nebulizer every 6 hours if needed, Disp: 300 mL, Rfl: 3    albuterol (PROVENTIL HFA,VENTOLIN HFA) 90 mcg/act inhaler, Inhale 2 puffs every 6 (six) hours as needed for wheezing or shortness of breath, Disp: 8 g, Rfl: 3    ALPRAZolam (XANAX) 0 25 mg tablet, take 1 tablet by mouth twice a day if needed for anxiety, Disp: 180 tablet, Rfl: 3    aspirin (ECOTRIN LOW STRENGTH) 81 mg EC tablet, Take by mouth, Disp: , Rfl:     budesonide-formoterol (Symbicort) 160-4 5 mcg/act inhaler, Inhale 2 puffs 2 (two) times a day Rinse mouth after use , Disp: 6 g, Rfl: 3    calcitriol (ROCALTROL) 0 25 mcg capsule, Take 1 capsule (0 25 mcg total) by mouth daily, Disp: 90 capsule, Rfl: 3    candesartan (ATACAND) 16 mg tablet, Take 1 tablet (16 mg total) by mouth daily at bedtime, Disp: 90 tablet, Rfl: 3    carvedilol (COREG) 12 5 mg tablet, Take 1 tablet (12 5 mg total) by mouth 2 (two) times a day with meals, Disp: 180 tablet, Rfl: 3    cholecalciferol (VITAMIN D3) 1,000 units tablet, Take 1,000 Units by mouth daily, Disp: , Rfl:     clopidogrel (PLAVIX) 75 mg tablet, Take 75 mg by mouth daily , Disp: , Rfl: 0    dextromethorphan-guaifenesin (MUCINEX DM)  MG per 12 hr tablet, Take 1 tablet by mouth every 12 (twelve) hours, Disp: , Rfl:     Diclofenac Sodium (VOLTAREN) 1 %, Apply 2 g topically 4 (four) times a day, Disp: 100 g, Rfl: 3    escitalopram (LEXAPRO) 10 mg tablet, Take 1 tablet (10 mg total) by mouth daily, Disp: 30 tablet, Rfl: 5    fluticasone (FLONASE) 50 mcg/act nasal spray, 1 spray into each nostril in the morning , Disp: 16 g, Rfl: 0    HYDROcodone-acetaminophen (NORCO) 5-325 mg per tablet, Take 1 tablet by mouth every 6 (six) hours as needed for pain Max Daily Amount: 4 tablets, Disp: 60 tablet, Rfl: 0    levothyroxine (Synthroid) 75 mcg tablet, Take 1 tablet (75 mcg total) by mouth daily in the early morning, Disp: 30 tablet, Rfl: 5    loratadine (CLARITIN) 10 mg tablet, Take 10 mg by mouth daily, Disp: , Rfl:     niacin (NIASPAN) 1000 MG CR tablet, Take 1,000 mg by mouth, Disp: , Rfl:     pantoprazole (PROTONIX) 40 mg tablet, Take 1 tablet (40 mg total) by mouth 2 (two) times a day, Disp: 180 tablet, Rfl: 3    rosuvastatin (CRESTOR) 40 MG tablet, Take 40 mg by mouth daily, Disp: , Rfl:     tiotropium (Spiriva HandiHaler) 18 mcg inhalation capsule, Place 1 capsule (18 mcg total) into inhaler and inhale in the morning , Disp: 30 capsule, Rfl: 3    torsemide (DEMADEX) 20 mg tablet, Take 1 tablet (20 mg total) by mouth daily, Disp: 90 tablet, Rfl: 3    zolpidem (AMBIEN CR) 6 25 MG CR tablet, Take 1 tablet (6 25 mg total) by mouth daily at bedtime as needed for sleep, Disp: 30 tablet, Rfl: 0    levothyroxine 100 mcg tablet, Take 1 tablet (100 mcg total) by mouth daily in the early morning (Patient not taking: Reported on 5/25/2022), Disp: 90 tablet, Rfl: 1    Review of Systems  Constitutional:     Denies fever, chills ,fatigue ,weakness ,weight loss, weight gain     ENT: Denies earache ,loss of hearing ,nosebleed, nasal discharge,nasal congestion ,sore throat ,hoarseness  Pulmonary: Denies shortness of breath ,cough  ,dyspnea on exertion, orthopnea  ,PND   Cardiovascular:  Denies bradycardia , tachycardia  ,palpations, lower extremity edema leg, claudication  Breast:  Denies new or changing breast lumps ,nipple discharge ,nipple changes  Abdomen:  Denies abdominal pain , anorexia , indigestion, nausea, vomiting, constipation, diarrhea  Musculoskeletal: Denies myalgias, arthralgias, joint swelling, joint stiffness , limb pain, limb swelling  Gu: denies dysuria, polyuria  Skin: Denies skin rash, skin lesion, skin wound, itching, dry skin  Neuro: Denies headache, numbness, tingling, confusion, loss of consciousness, dizziness, vertigo  Psychiatric: Denies feelings of depression, suicidal ideation, anxiety, sleep disturbances    OBJECTIVE  /78   Pulse 68   Temp 97 8 °F (36 6 °C)   Ht 5' 4" (1 626 m)   Wt 81 6 kg (180 lb)   SpO2 94%   BMI 30 90 kg/m²   Constitutional:   NAD, well appearing and well nourished      ENT:   Conjunctiva and lids: no injection, edema, or discharge     Pupils and iris: MILADY bilaterally    External inspection of ears and nose: normal without deformities or discharge  Otoscopic exam: Canals patent without erythema  Nasal mucosa, septum and turbinates: Normal or edema or discharge         Oropharynx:  Moist mucosa, normal tongue and tonsils without lesions  No erythema        Pulmonary:Respiratory effort normal rate and rhythm, no increased work of breathing  Auscultation of lungs:  Clear bilaterally with no adventitious breath sounds       Cardiovascular: regular rate and rhythm, S1 and S2, no murmur, no edema and/or varicosities of LE      Abdomen: Soft and non-distended     Positive bowel sounds      No heptomegaly or splenomegaly      Gu: no suprapubic tenderness or CVA tenderness, no urethral discharge  Lymphatic:  No anterior or posterior cervical lymphadenopathy         Musculoskeletal:  Gait and station: Normal gait      Digits and nails normal without clubbing or cyanosis       Inspection/palpation of joints, bones, and muscles:  No joint tenderness, swelling, full active and passive range of motion       Skin: Normal skin turgor and no rashes      Neuro:    Normal reflexes     Psych:   alert and oriented to person, place and time     normal mood and affect   Patient's shoes and socks removed  Right Foot/Ankle   Right Foot Inspection  Skin Exam: skin normal and skin intact   No dry skin, no warmth, no callus, no erythema, no maceration, no abnormal color, no pre-ulcer, no ulcer and no callus  Toe Exam: ROM and strength within normal limits  No swelling, no tenderness and erythema    Sensory   Vibration: intact  Proprioception: intact  Monofilament testing: intact    Vascular  Capillary refills: < 3 seconds  The right DP pulse is 2+  The right PT pulse is 2+  Left Foot/Ankle  Left Foot Inspection  Skin Exam: skin normal and skin intact  No dry skin, no warmth, no erythema, no maceration, normal color, no pre-ulcer, no ulcer and no callus  Toe Exam: ROM and strength within normal limits  No swelling, no tenderness, no erythema and no left toe deformity  Sensory   Vibration: intact  Proprioception: intact  Monofilament testing: intact    Vascular  Capillary refills: < 3 seconds  The left DP pulse is 2+  The left PT pulse is 2+  Assign Risk Category  No deformity present  No loss of protective sensation  No weak pulses  Risk: 0    Assessment/Plan:Diagnoses and all orders for this visit:    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, unspecified CKD stage, unspecified whether long term insulin use (HCC)  -     POCT hemoglobin A1c    Hypercholesterolemia  -     Lipid Panel with Direct LDL reflex; Future    Vitamin D deficiency  -     Vitamin D 25 hydroxy; Future    Essential hypertension  -     Comprehensive metabolic panel; Future    Hypothyroidism, unspecified type  -     TSH, 3rd generation; Future    Chronic obstructive pulmonary disease, unspecified COPD type ([facility] )  Comments:  continue wiht inhaler therapy  Orders:  -     albuterol (PROVENTIL HFA,VENTOLIN HFA) 90 mcg/act inhaler; Inhale 2 puffs every 6 (six) hours as needed for wheezing or shortness of breath    Asymptomatic postmenopausal estrogen deficiency  -     DXA bone density spine hip and pelvis; Future    I will see patient back in 4 mos or sooner prn

## 2022-06-01 NOTE — PATIENT INSTRUCTIONS
Foot Care for People with Diabetes   AMBULATORY CARE:   What you need to know about foot care:   · Foot care helps protect your feet and prevent foot ulcers or sores  Long-term high blood sugar levels can damage the blood vessels and nerves in your legs and feet  This damage makes it hard to feel pressure, pain, temperature, and touch  You may not be able to feel a cut or sore, or shoes that are too tight  Foot care is needed to prevent serious problems, such as an infection or amputation  · Diabetes may cause your toes to become crooked or curved under  These changes may affect the way you walk and can lead to increased pressure on your foot  The pressure can decrease blood flow to your feet  Lack of blood flow increases your risk for a foot ulcer  Do not ignore small problems, such as dry skin or small wounds  These can become life-threatening over time without proper care  Call your care team provider if:   · Your feet become numb, weak, or hard to move  · You have pus draining from a sore on your foot  · You have a wound on your foot that gets bigger, deeper, or does not heal      · You see blisters, cuts, scratches, calluses, or sores on your foot  · You have a fever, and your feet become red, warm, and swollen  · Your toenails become thick, curled, or yellow  · You find it hard to check your feet because your vision is poor  · You have questions or concerns about your condition or care  How to care for your feet:   · Check your feet each day  Look at your whole foot, including the bottom, and between and under your toes  Check for wounds, corns, and calluses  Use a mirror to see the bottom of your feet  The skin on your feet may be shiny, tight, or darker than normal  Your feet may also be cold and pale  Feel your feet by running your hands along the tops, bottoms, sides, and between your toes   Redness, swelling, and warmth are signs of blood flow problems that can lead to a foot ulcer  Do not try to remove corns or calluses yourself  · Wash your feet each day with soap and warm water  Do not use hot water, because this can injure your foot  Dry your feet gently with a towel after you wash them  Dry between and under your toes  · Apply lotion or a moisturizer on your dry feet  Ask your care team provider what lotions are best to use  Do not put lotion or moisturizer between your toes  Moisture between your toes could lead to skin breakdown  · Cut your toenails correctly  File or cut your toenails straight across  Use a soft brush to clean around your toenails  If your toenails are very thick, you may need to have a care team provider or specialist cut them  · Protect your feet  Do not walk barefoot or wear your shoes without socks  Check your shoes for rocks or other objects that can hurt your feet  Wear cotton socks to help keep your feet dry  Wear socks without toe seams, or wear them with the seams inside out  Change your socks each day  Do not wear socks that are dirty or damp  · Wear shoes that fit well  Wear shoes that do not rub against any area of your feet  Your shoes should be ½ to ¾ inch (1 to 2 centimeters) longer than your feet  Your shoes should also have extra space around the widest part of your feet  Walking or athletic shoes with laces or straps that adjust are best  Ask your care team provider for help to choose shoes that fit you best  Ask him or her if you need to wear an insert, orthotic, or bandage on your feet  · Go to your follow-up visits  Your care team provider will do a foot exam at least once a year  You may need a foot exam more often if you have nerve damage, foot deformities, or ulcers  He will check for nerve damage and how well you can feel your feet  He will check your shoes to see if they fit well  · Do not smoke  Smoking can damage your blood vessels and put you at increased risk for foot ulcers   Ask your care team provider for information if you currently smoke and need help to quit  E-cigarettes or smokeless tobacco still contain nicotine  Talk to your care team provider before you use these products  Follow up with your diabetes care team provider or foot specialist as directed: You will need to have your feet checked at least once a year  You may need a foot exam more often if you have nerve damage, foot deformities, or ulcers  Write down your questions so you remember to ask them during your visits  © Copyright [employer] 2022 Information is for End User's use only and may not be sold, redistributed or otherwise used for commercial purposes  All illustrations and images included in CareNotes® are the copyrighted property of A D A M , Inc  or [facility] Hugo   The above information is an  only  It is not intended as medical advice for individual conditions or treatments  Talk to your doctor, nurse or pharmacist before following any medical regimen to see if it is safe and effective for you

## 2022-06-09 ENCOUNTER — RA CDI HCC (OUTPATIENT)
Dept: OTHER | Facility: HOSPITAL | Age: 82
End: 2022-06-09

## 2022-06-09 NOTE — PROGRESS NOTES
[facility]  coding opportunities          Chart Reviewed number of suggestions sent to Provider: 1     Patients Insurance     Medicare Insurance: [employer]        [id number]

## 2022-06-21 DIAGNOSIS — G47.00 INSOMNIA, UNSPECIFIED TYPE: ICD-10-CM

## 2022-06-21 DIAGNOSIS — J30.89 ALLERGIC RHINITIS DUE TO OTHER ALLERGIC TRIGGER, UNSPECIFIED SEASONALITY: ICD-10-CM

## 2022-06-21 RX ORDER — ZOLPIDEM TARTRATE 6.25 MG/1
6.25 TABLET, FILM COATED, EXTENDED RELEASE ORAL
Qty: 30 TABLET | Refills: 0 | Status: SHIPPED | OUTPATIENT
Start: 2022-06-21 | End: 2022-07-25 | Stop reason: SDUPTHER

## 2022-06-21 RX ORDER — FLUTICASONE PROPIONATE 50 MCG
1 SPRAY, SUSPENSION (ML) NASAL DAILY
Qty: 16 G | Refills: 0 | Status: SHIPPED | OUTPATIENT
Start: 2022-06-21 | End: 2022-07-25 | Stop reason: SDUPTHER

## 2022-07-01 DIAGNOSIS — M54.16 LUMBAR RADICULOPATHY: ICD-10-CM

## 2022-07-01 RX ORDER — HYDROCODONE BITARTRATE AND ACETAMINOPHEN 5; 325 MG/1; MG/1
1 TABLET ORAL EVERY 6 HOURS PRN
Qty: 60 TABLET | Refills: 0 | Status: SHIPPED | OUTPATIENT
Start: 2022-07-01 | End: 2022-08-23 | Stop reason: SDUPTHER

## 2022-07-01 NOTE — TELEPHONE ENCOUNTER
1  [id number] 05/09/2022 05/09/2022 HYDROcodone BITARTRATE-ACETAMINOPHE (Tablet)  60 0 15 325 MG-5 MG 20 0 SALONI COPE  [facility] Medicare 00 / 00 [place]

## 2022-07-22 ENCOUNTER — TELEPHONE (OUTPATIENT)
Dept: NEPHROLOGY | Facility: CLINIC | Age: 82
End: 2022-07-22

## 2022-07-25 DIAGNOSIS — F32.9 REACTIVE DEPRESSION: ICD-10-CM

## 2022-07-25 DIAGNOSIS — J30.89 ALLERGIC RHINITIS DUE TO OTHER ALLERGIC TRIGGER, UNSPECIFIED SEASONALITY: ICD-10-CM

## 2022-07-25 DIAGNOSIS — G47.00 INSOMNIA, UNSPECIFIED TYPE: ICD-10-CM

## 2022-07-25 RX ORDER — ZOLPIDEM TARTRATE 6.25 MG/1
6.25 TABLET, FILM COATED, EXTENDED RELEASE ORAL
Qty: 30 TABLET | Refills: 0 | Status: SHIPPED | OUTPATIENT
Start: 2022-07-25 | End: 2022-08-23 | Stop reason: SDUPTHER

## 2022-07-25 RX ORDER — FLUTICASONE PROPIONATE 50 MCG
1 SPRAY, SUSPENSION (ML) NASAL DAILY
Qty: 16 G | Refills: 0 | Status: SHIPPED | OUTPATIENT
Start: 2022-07-25 | End: 2022-09-15 | Stop reason: SDUPTHER

## 2022-07-25 RX ORDER — ESCITALOPRAM OXALATE 10 MG/1
10 TABLET ORAL DAILY
Qty: 30 TABLET | Refills: 5 | Status: SHIPPED | OUTPATIENT
Start: 2022-07-25 | End: 2022-09-28 | Stop reason: SDUPTHER

## 2022-08-23 DIAGNOSIS — G47.00 INSOMNIA, UNSPECIFIED TYPE: ICD-10-CM

## 2022-08-23 DIAGNOSIS — M54.16 LUMBAR RADICULOPATHY: ICD-10-CM

## 2022-08-23 RX ORDER — ZOLPIDEM TARTRATE 6.25 MG/1
6.25 TABLET, FILM COATED, EXTENDED RELEASE ORAL
Qty: 30 TABLET | Refills: 0 | Status: SHIPPED | OUTPATIENT
Start: 2022-08-23 | End: 2022-09-28 | Stop reason: SDUPTHER

## 2022-08-23 RX ORDER — HYDROCODONE BITARTRATE AND ACETAMINOPHEN 5; 325 MG/1; MG/1
1 TABLET ORAL EVERY 6 HOURS PRN
Qty: 60 TABLET | Refills: 0 | Status: SHIPPED | OUTPATIENT
Start: 2022-08-23 | End: 2022-10-13 | Stop reason: SDUPTHER

## 2022-08-23 NOTE — TELEPHONE ENCOUNTER
1  [id number] 07/25/2022 07/25/2022 Zolpidem Tartrate (Tablet, Extended Release)  30 0 30 6 25 MG NA SALONI BROADT  [facility] [place] 0 / 0 [place]    1  [id number] 07/01/2022 07/01/2022 HYDROcodone BITARTRATE-ACETAMINOPHE (Tablet)  60 0 15 325 MG-5 MG 20 0 SALONI BROADT  [facility] Medicare 0 / 0 [place]    1  [id number] 06/22/2022 06/21/2022 Zolpidem Tartrate (Tablet, Extended Release)  30 0 30 6 25 MG NA SALONI BROADT  [facility] Medicare 0 / 0 [place]

## 2022-09-15 DIAGNOSIS — E03.9 HYPOTHYROIDISM, UNSPECIFIED TYPE: ICD-10-CM

## 2022-09-15 DIAGNOSIS — J30.89 ALLERGIC RHINITIS DUE TO OTHER ALLERGIC TRIGGER, UNSPECIFIED SEASONALITY: ICD-10-CM

## 2022-09-15 RX ORDER — FLUTICASONE PROPIONATE 50 MCG
1 SPRAY, SUSPENSION (ML) NASAL DAILY
Qty: 16 G | Refills: 0 | Status: SHIPPED | OUTPATIENT
Start: 2022-09-15 | End: 2022-10-27 | Stop reason: SDUPTHER

## 2022-09-15 RX ORDER — LEVOTHYROXINE SODIUM 0.1 MG/1
100 TABLET ORAL
Qty: 90 TABLET | Refills: 1 | Status: SHIPPED | OUTPATIENT
Start: 2022-09-15

## 2022-09-28 ENCOUNTER — OFFICE VISIT (OUTPATIENT)
Dept: FAMILY MEDICINE CLINIC | Facility: CLINIC | Age: 82
End: 2022-09-28
Payer: COMMERCIAL

## 2022-09-28 ENCOUNTER — APPOINTMENT (OUTPATIENT)
Dept: LAB | Facility: CLINIC | Age: 82
End: 2022-09-28
Payer: COMMERCIAL

## 2022-09-28 VITALS
HEIGHT: 64 IN | WEIGHT: 177.8 LBS | BODY MASS INDEX: 30.35 KG/M2 | OXYGEN SATURATION: 99 % | HEART RATE: 59 BPM | TEMPERATURE: 97.7 F

## 2022-09-28 DIAGNOSIS — E03.9 HYPOTHYROIDISM, UNSPECIFIED TYPE: ICD-10-CM

## 2022-09-28 DIAGNOSIS — E55.9 VITAMIN D DEFICIENCY: ICD-10-CM

## 2022-09-28 DIAGNOSIS — M25.562 CHRONIC PAIN OF LEFT KNEE: ICD-10-CM

## 2022-09-28 DIAGNOSIS — F32.9 REACTIVE DEPRESSION: ICD-10-CM

## 2022-09-28 DIAGNOSIS — G47.00 INSOMNIA, UNSPECIFIED TYPE: ICD-10-CM

## 2022-09-28 DIAGNOSIS — Z79.4 OTHER SPECIFIED DIABETES MELLITUS WITH CHRONIC KIDNEY DISEASE, WITH LONG-TERM CURRENT USE OF INSULIN, UNSPECIFIED CKD STAGE (HCC): Primary | ICD-10-CM

## 2022-09-28 DIAGNOSIS — E78.00 HYPERCHOLESTEROLEMIA: ICD-10-CM

## 2022-09-28 DIAGNOSIS — I50.33 ACUTE ON CHRONIC DIASTOLIC (CONGESTIVE) HEART FAILURE (HCC): ICD-10-CM

## 2022-09-28 DIAGNOSIS — I10 ESSENTIAL HYPERTENSION: ICD-10-CM

## 2022-09-28 DIAGNOSIS — I13.0 HYPERTENSIVE HEART AND CHRONIC KIDNEY DISEASE WITH HEART FAILURE AND STAGE 1 THROUGH STAGE 4 CHRONIC KIDNEY DISEASE, OR CHRONIC KIDNEY DISEASE (HCC): ICD-10-CM

## 2022-09-28 DIAGNOSIS — N25.81 SECONDARY HYPERPARATHYROIDISM OF RENAL ORIGIN (HCC): ICD-10-CM

## 2022-09-28 DIAGNOSIS — N18.31 CKD STAGE G3A/A1, GFR 45-59 AND ALBUMIN CREATININE RATIO <30 MG/G (HCC): ICD-10-CM

## 2022-09-28 DIAGNOSIS — G89.29 CHRONIC PAIN OF LEFT KNEE: ICD-10-CM

## 2022-09-28 DIAGNOSIS — N18.31 STAGE 3A CHRONIC KIDNEY DISEASE (HCC): ICD-10-CM

## 2022-09-28 DIAGNOSIS — I10 PRIMARY HYPERTENSION: ICD-10-CM

## 2022-09-28 DIAGNOSIS — Z23 NEED FOR VIRAL IMMUNIZATION: ICD-10-CM

## 2022-09-28 DIAGNOSIS — E13.22 OTHER SPECIFIED DIABETES MELLITUS WITH CHRONIC KIDNEY DISEASE, WITH LONG-TERM CURRENT USE OF INSULIN, UNSPECIFIED CKD STAGE (HCC): Primary | ICD-10-CM

## 2022-09-28 DIAGNOSIS — E66.9 OBESITY (BMI 30.0-34.9): ICD-10-CM

## 2022-09-28 LAB
25(OH)D3 SERPL-MCNC: 41 NG/ML (ref 30–100)
ALBUMIN SERPL BCP-MCNC: 3.6 G/DL (ref 3.5–5)
ALP SERPL-CCNC: 61 U/L (ref 46–116)
ALT SERPL W P-5'-P-CCNC: 20 U/L (ref 12–78)
ANION GAP SERPL CALCULATED.3IONS-SCNC: 5 MMOL/L (ref 4–13)
AST SERPL W P-5'-P-CCNC: 19 U/L (ref 5–45)
BILIRUB SERPL-MCNC: 0.39 MG/DL (ref 0.2–1)
BUN SERPL-MCNC: 30 MG/DL (ref 5–25)
CALCIUM SERPL-MCNC: 9.4 MG/DL (ref 8.3–10.1)
CHLORIDE SERPL-SCNC: 104 MMOL/L (ref 96–108)
CHOLEST SERPL-MCNC: 186 MG/DL
CO2 SERPL-SCNC: 30 MMOL/L (ref 21–32)
CREAT SERPL-MCNC: 1.18 MG/DL (ref 0.6–1.3)
GFR SERPL CREATININE-BSD FRML MDRD: 43 ML/MIN/1.73SQ M
GLUCOSE P FAST SERPL-MCNC: 90 MG/DL (ref 65–99)
HDLC SERPL-MCNC: 57 MG/DL
LDLC SERPL CALC-MCNC: 75 MG/DL (ref 0–100)
POTASSIUM SERPL-SCNC: 3.8 MMOL/L (ref 3.5–5.3)
PROT SERPL-MCNC: 8 G/DL (ref 6.4–8.4)
PTH-INTACT SERPL-MCNC: 92.1 PG/ML (ref 18.4–80.1)
SL AMB POCT HEMOGLOBIN AIC: 6.4 (ref ?–6.5)
SODIUM SERPL-SCNC: 139 MMOL/L (ref 135–147)
TRIGL SERPL-MCNC: 268 MG/DL
TSH SERPL DL<=0.05 MIU/L-ACNC: 1.13 UIU/ML (ref 0.45–4.5)

## 2022-09-28 PROCEDURE — 1101F PT FALLS ASSESS-DOCD LE1/YR: CPT | Performed by: FAMILY MEDICINE

## 2022-09-28 PROCEDURE — 82306 VITAMIN D 25 HYDROXY: CPT

## 2022-09-28 PROCEDURE — 99214 OFFICE O/P EST MOD 30 MIN: CPT | Performed by: FAMILY MEDICINE

## 2022-09-28 PROCEDURE — G0008 ADMIN INFLUENZA VIRUS VAC: HCPCS | Performed by: FAMILY MEDICINE

## 2022-09-28 PROCEDURE — 80053 COMPREHEN METABOLIC PANEL: CPT

## 2022-09-28 PROCEDURE — 83970 ASSAY OF PARATHORMONE: CPT

## 2022-09-28 PROCEDURE — 3725F SCREEN DEPRESSION PERFORMED: CPT | Performed by: FAMILY MEDICINE

## 2022-09-28 PROCEDURE — 90662 IIV NO PRSV INCREASED AG IM: CPT | Performed by: FAMILY MEDICINE

## 2022-09-28 PROCEDURE — 36415 COLL VENOUS BLD VENIPUNCTURE: CPT

## 2022-09-28 PROCEDURE — 3288F FALL RISK ASSESSMENT DOCD: CPT | Performed by: FAMILY MEDICINE

## 2022-09-28 PROCEDURE — 80061 LIPID PANEL: CPT

## 2022-09-28 PROCEDURE — 1160F RVW MEDS BY RX/DR IN RCRD: CPT | Performed by: FAMILY MEDICINE

## 2022-09-28 PROCEDURE — 83036 HEMOGLOBIN GLYCOSYLATED A1C: CPT | Performed by: FAMILY MEDICINE

## 2022-09-28 PROCEDURE — 84443 ASSAY THYROID STIM HORMONE: CPT

## 2022-09-28 RX ORDER — ESCITALOPRAM OXALATE 10 MG/1
10 TABLET ORAL DAILY
Qty: 30 TABLET | Refills: 5 | Status: SHIPPED | OUTPATIENT
Start: 2022-09-28

## 2022-09-28 RX ORDER — SENNA PLUS 8.6 MG/1
1 TABLET ORAL DAILY
COMMUNITY

## 2022-09-28 RX ORDER — ZOLPIDEM TARTRATE 6.25 MG/1
6.25 TABLET, FILM COATED, EXTENDED RELEASE ORAL
Qty: 30 TABLET | Refills: 0 | Status: SHIPPED | OUTPATIENT
Start: 2022-09-28 | End: 2022-10-27 | Stop reason: SDUPTHER

## 2022-09-29 NOTE — PROGRESS NOTES
Patient ID: Nestor Hall is a 80 y o  female  HPI: 80 y  o female presents for follow up of niddm, hypertension, hypothyroidism, depression, chronic chf, insomnia, vit d deficiency and hypercholesterolemia  Hgba1c is  6 4 and patient's issues are well controlled  Patient deneis any dyspnea, chest pain or palpitations  She has CKD and can't take nsaids  She states her left knee is becoming fmore painful with weight beraing and at time is unstable and gives out  SUBJECTIVE    Family History   Problem Relation Age of Onset    Hypertension Father         essential     Heart disease Father     Kidney disease Mother     Diabetes Mother      Social History     Socioeconomic History    Marital status:       Spouse name: Not on file    Number of children: 3    Years of education: less than high school    Highest education level: Not on file   Occupational History    Not on file   Tobacco Use    Smoking status: Former Smoker     Quit date: 3/1/2019     Years since quitting: 3 5    Smokeless tobacco: Never Used   Substance and Sexual Activity    Alcohol use: No    Drug use: Never    Sexual activity: Not Currently   Other Topics Concern    Not on file   Social History Narrative    Daily coffee consumption:    Daily cola consumption:     Tea    Water intake, adequate (per day)     Social Determinants of Health     Financial Resource Strain: Not on file   Food Insecurity: Not on file   Transportation Needs: Not on file   Physical Activity: Not on file   Stress: Not on file   Social Connections: Not on file   Intimate Partner Violence: Not on file   Housing Stability: Not on file     Past Medical History:   Diagnosis Date    Coronary artery disease     Hyperlipidemia     Hypertension     Peripheral vascular disease ([facility] [address] )      Past Surgical History:   Procedure Laterality Date    CARDIAC SURGERY      HYSTERECTOMY      age 32   Clifm Juan OOPHORECTOMY Right     age 32   Clifm Juan SMALL INTESTINE SURGERY      6 inches    TONSILLECTOMY      US GUIDED BREAST BIOPSY RIGHT COMPLETE Right 4/24/2019     No Known Allergies    Current Outpatient Medications:     acetaminophen (TYLENOL) 500 mg tablet, Take 500 mg by mouth every 6 (six) hours, Disp: , Rfl:     albuterol (2 5 mg/3 mL) 0 083 % nebulizer solution, inhale contents of 1 vial in nebulizer every 6 hours if needed, Disp: 300 mL, Rfl: 3    albuterol (PROVENTIL HFA,VENTOLIN HFA) 90 mcg/act inhaler, Inhale 2 puffs every 6 (six) hours as needed for wheezing or shortness of breath, Disp: 8 g, Rfl: 3    ALPRAZolam (XANAX) 0 25 mg tablet, take 1 tablet by mouth twice a day if needed for anxiety, Disp: 180 tablet, Rfl: 3    aspirin (ECOTRIN LOW STRENGTH) 81 mg EC tablet, Take by mouth, Disp: , Rfl:     budesonide-formoterol (Symbicort) 160-4 5 mcg/act inhaler, Inhale 2 puffs 2 (two) times a day Rinse mouth after use , Disp: 6 g, Rfl: 3    calcitriol (ROCALTROL) 0 25 mcg capsule, Take 1 capsule (0 25 mcg total) by mouth daily, Disp: 90 capsule, Rfl: 3    candesartan (ATACAND) 16 mg tablet, Take 1 tablet (16 mg total) by mouth daily at bedtime, Disp: 90 tablet, Rfl: 3    carvedilol (COREG) 12 5 mg tablet, Take 1 tablet (12 5 mg total) by mouth 2 (two) times a day with meals, Disp: 180 tablet, Rfl: 3    cholecalciferol (VITAMIN D3) 1,000 units tablet, Take 1,000 Units by mouth daily, Disp: , Rfl:     clopidogrel (PLAVIX) 75 mg tablet, Take 75 mg by mouth daily , Disp: , Rfl: 0    dextromethorphan-guaifenesin (MUCINEX DM)  MG per 12 hr tablet, Take 1 tablet by mouth every 12 (twelve) hours, Disp: , Rfl:     Diclofenac Sodium (VOLTAREN) 1 %, Apply 2 g topically 4 (four) times a day, Disp: 100 g, Rfl: 3    escitalopram (LEXAPRO) 10 mg tablet, Take 1 tablet (10 mg total) by mouth daily, Disp: 30 tablet, Rfl: 5    fluticasone (FLONASE) 50 mcg/act nasal spray, 1 spray into each nostril daily, Disp: 16 g, Rfl: 0    HYDROcodone-acetaminophen (NORCO) 5-325 mg per tablet, Take 1 tablet by mouth every 6 (six) hours as needed for pain Max Daily Amount: 4 tablets, Disp: 60 tablet, Rfl: 0    levothyroxine 100 mcg tablet, Take 1 tablet (100 mcg total) by mouth daily in the early morning, Disp: 90 tablet, Rfl: 1    loratadine (CLARITIN) 10 mg tablet, Take 10 mg by mouth daily, Disp: , Rfl:     niacin (NIASPAN) 1000 MG CR tablet, Take 1,000 mg by mouth, Disp: , Rfl:     pantoprazole (PROTONIX) 40 mg tablet, Take 1 tablet (40 mg total) by mouth 2 (two) times a day, Disp: 180 tablet, Rfl: 3    rosuvastatin (CRESTOR) 40 MG tablet, Take 40 mg by mouth daily, Disp: , Rfl:     senna (SENOKOT) 8 6 MG tablet, Take 1 tablet by mouth daily, Disp: , Rfl:     tiotropium (Spiriva HandiHaler) 18 mcg inhalation capsule, Place 1 capsule (18 mcg total) into inhaler and inhale in the morning , Disp: 30 capsule, Rfl: 3    torsemide (DEMADEX) 20 mg tablet, Take 1 tablet (20 mg total) by mouth daily, Disp: 90 tablet, Rfl: 3    zolpidem (AMBIEN CR) 6 25 MG CR tablet, Take 1 tablet (6 25 mg total) by mouth daily at bedtime as needed for sleep, Disp: 30 tablet, Rfl: 0    levothyroxine (Synthroid) 75 mcg tablet, Take 1 tablet (75 mcg total) by mouth daily in the early morning (Patient not taking: Reported on 9/28/2022), Disp: 30 tablet, Rfl: 5    Review of Systems  Constitutional:     Denies fever, chills ,fatigue ,weakness ,weight loss, weight gain     ENT: Denies earache ,loss of hearing ,nosebleed, nasal discharge,nasal congestion ,sore throat ,hoarseness  Pulmonary: Denies shortness of breath ,cough  ,dyspnea on exertion, orthopnea  ,PND   Cardiovascular:  Denies bradycardia , tachycardia  ,palpations, lower extremity edema leg, claudication  Breast:  Denies new or changing breast lumps ,nipple discharge ,nipple changes  Abdomen:  Denies abdominal pain , anorexia , indigestion, nausea, vomiting, constipation, diarrhea  Musculoskeletal: Denies myalgias,  joint swelling, , limb pain, limb swelling+ left kne pain, stiffness and pain with weight bearing as well as instability  [place]: denies dysuria, polyuria  Skin: Denies skin rash, skin lesion, skin wound, itching, dry skin  Neuro: Denies headache, numbness, tingling, confusion, loss of consciousness, dizziness, vertigo  Psychiatric: Denies feelings of depression, suicidal ideation, anxiety, sleep disturbances    OBJECTIVE  Pulse 59   Temp 97 7 °F (36 5 °C)   Ht 5' 4" (1 626 m)   Wt 80 6 kg (177 lb 12 8 oz)   SpO2 99%   BMI 30 52 kg/m²   Constitutional:   NAD, well appearing and well nourished      ENT:   Conjunctiva and lids: no injection, edema, or discharge     Pupils and iris: MLIADY bilaterally    External inspection of ears and nose: normal without deformities or discharge  Otoscopic exam: Canals patent without erythema  Nasal mucosa, septum and turbinates: Normal or edema or discharge         Oropharynx:  Moist mucosa, normal tongue and tonsils without lesions  No erythema        Pulmonary:Respiratory effort normal rate and rhythm, no increased work of breathing   Auscultation of lungs:  Clear bilaterally with no adventitious breath sounds       Cardiovascular: regular rate and rhythm, S1 and S2, no murmur, no edema and/or varicosities of LE      Abdomen: Soft and non-distended     Positive bowel sounds      No heptomegaly or splenomegaly      Gu: no suprapubic tenderness or CVA tenderness, no urethral discharge  Lymphatic:  No anterior or posterior cervical lymphadenopathy         Musculoskeletal:  Gait and station: Normal gait      Digits and nails normal without clubbing or cyanosis       Inspection/palpation of joints, bones, and muscles:  No joint tenderness, swelling, full active and passive range of motion       Skin: Normal skin turgor and no rashes      Neuro:    Normal reflexes     Psych:   alert and oriented to person, place and time     normal mood and affect       Assessment/Plan:Diagnoses and all orders for this visit:    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, with long-term current use of insulin, unspecified CKD stage ([facility] [address] )  Comments:  Stable on current tx  Orders:  -     POCT hemoglobin A1c    Essential hypertension  Comments:  conintue ARB tx  Orders:  -     Comprehensive metabolic panel; Future    Hypercholesterolemia  Comments:  Continue statin tx  Orders:  -     Lipid Panel with Direct LDL reflex; Future    Hypothyroidism, unspecified type  Comments:  continue levothyroxine tx  Orders:  -     TSH, 3rd generation; Future    Chronic pain of left knee  Comments:  Pt unable to take nsaids fue to renal function   Will obtain xrays to see if she is a candidate for durolane  Orders:  -     XR knee 3 vw left non injury; Future    Vitamin D deficiency  Comments:  Continue vit D therapy   Orders:  -     Vitamin D 25 hydroxy; Future    Insomnia, unspecified type  Comments:  Contineu prn zolpidem tx  Orders:  -     zolpidem (AMBIEN CR) 6 25 MG CR tablet; Take 1 tablet (6 25 mg total) by mouth daily at bedtime as needed for sleep    Reactive depression  Comments:  Contineu lexapro thearpy   Orders:  -     escitalopram (LEXAPRO) 10 mg tablet; Take 1 tablet (10 mg total) by mouth daily    Acute on chronic diastolic (congestive) heart failure (HCC)  Comments:  Continue daily weights, low sodium diet and cardiology followup    Need for viral immunization  -     influenza vaccine, high-dose, PF 0 7 mL (FLUZONE HIGH-DOSE)    Other orders  -     senna (SENOKOT) 8 6 MG tablet; Take 1 tablet by mouth daily        Reviewed with patient plan to treat with above plan      Patient instructed to call in 72 hours if not feeling better or if symptoms worsen

## 2022-10-03 ENCOUNTER — VBI (OUTPATIENT)
Dept: ADMINISTRATIVE | Facility: OTHER | Age: 82
End: 2022-10-03

## 2022-10-13 DIAGNOSIS — M54.16 LUMBAR RADICULOPATHY: ICD-10-CM

## 2022-10-14 RX ORDER — HYDROCODONE BITARTRATE AND ACETAMINOPHEN 5; 325 MG/1; MG/1
1 TABLET ORAL EVERY 6 HOURS PRN
Qty: 60 TABLET | Refills: 0 | Status: SHIPPED | OUTPATIENT
Start: 2022-10-14

## 2022-10-27 DIAGNOSIS — G47.00 INSOMNIA, UNSPECIFIED TYPE: ICD-10-CM

## 2022-10-27 DIAGNOSIS — J30.89 ALLERGIC RHINITIS DUE TO OTHER ALLERGIC TRIGGER, UNSPECIFIED SEASONALITY: ICD-10-CM

## 2022-10-27 RX ORDER — ZOLPIDEM TARTRATE 6.25 MG/1
6.25 TABLET, FILM COATED, EXTENDED RELEASE ORAL
Qty: 30 TABLET | Refills: 0 | Status: SHIPPED | OUTPATIENT
Start: 2022-10-27

## 2022-10-27 RX ORDER — FLUTICASONE PROPIONATE 50 MCG
1 SPRAY, SUSPENSION (ML) NASAL DAILY
Qty: 16 G | Refills: 0 | Status: SHIPPED | OUTPATIENT
Start: 2022-10-27

## 2022-10-27 NOTE — TELEPHONE ENCOUNTER
1 [id number] 09/28/2022 09/28/2022 Zolpidem Tartrate (Tablet, Extended Release) 30 0 30 6 25 MG NA SALONI CHAUDHRY [facility] Medicare 0 / 0 [place]    1 [id number] 08/25/2022 08/23/2022 Zolpidem Tartrate (Tablet, Extended Release) 30 0 30 6 25 MG  [facility] Medicare 0 / 0 [place]

## 2022-11-17 ENCOUNTER — VBI (OUTPATIENT)
Dept: ADMINISTRATIVE | Facility: OTHER | Age: 82
End: 2022-11-17

## 2022-11-30 ENCOUNTER — TELEPHONE (OUTPATIENT)
Dept: FAMILY MEDICINE CLINIC | Facility: CLINIC | Age: 82
End: 2022-11-30

## 2022-11-30 DIAGNOSIS — J44.9 CHRONIC OBSTRUCTIVE PULMONARY DISEASE, UNSPECIFIED COPD TYPE (HCC): ICD-10-CM

## 2022-11-30 DIAGNOSIS — J45.909 UNCOMPLICATED ASTHMA, UNSPECIFIED ASTHMA SEVERITY, UNSPECIFIED WHETHER PERSISTENT: ICD-10-CM

## 2022-11-30 DIAGNOSIS — J30.89 ALLERGIC RHINITIS DUE TO OTHER ALLERGIC TRIGGER, UNSPECIFIED SEASONALITY: ICD-10-CM

## 2022-11-30 DIAGNOSIS — M54.16 LUMBAR RADICULOPATHY: ICD-10-CM

## 2022-11-30 DIAGNOSIS — G47.00 INSOMNIA, UNSPECIFIED TYPE: ICD-10-CM

## 2022-11-30 RX ORDER — FLUTICASONE PROPIONATE 50 MCG
1 SPRAY, SUSPENSION (ML) NASAL DAILY
Qty: 16 G | Refills: 0 | Status: SHIPPED | OUTPATIENT
Start: 2022-11-30

## 2022-11-30 RX ORDER — HYDROCODONE BITARTRATE AND ACETAMINOPHEN 5; 325 MG/1; MG/1
1 TABLET ORAL EVERY 6 HOURS PRN
Qty: 60 TABLET | Refills: 0 | Status: SHIPPED | OUTPATIENT
Start: 2022-11-30

## 2022-11-30 RX ORDER — ALBUTEROL SULFATE 2.5 MG/3ML
2.5 SOLUTION RESPIRATORY (INHALATION) EVERY 6 HOURS PRN
Qty: 300 ML | Refills: 3 | Status: SHIPPED | OUTPATIENT
Start: 2022-11-30

## 2022-11-30 RX ORDER — ZOLPIDEM TARTRATE 6.25 MG/1
6.25 TABLET, FILM COATED, EXTENDED RELEASE ORAL
Qty: 30 TABLET | Refills: 0 | Status: SHIPPED | OUTPATIENT
Start: 2022-11-30

## 2022-11-30 NOTE — TELEPHONE ENCOUNTER
Patient called asking for refills on:  -ALBUTEROL(2 5/3ML) 0 083% NEBULIZER SOLUTION  -HYDROCODONE-ACETAMINOPHEN 5-325 MG   - FLUTICASONE 50 MCG/ACT NASAL SPRAY  - ZOLPIDEM 6 25 MG MG CR TAB    She is also asking if you recommend that she get her COVID booster yet

## 2022-12-06 ENCOUNTER — TELEPHONE (OUTPATIENT)
Dept: NEPHROLOGY | Facility: CLINIC | Age: 82
End: 2022-12-06

## 2022-12-06 DIAGNOSIS — E55.9 VITAMIN D DEFICIENCY: ICD-10-CM

## 2022-12-06 DIAGNOSIS — N18.31 CKD STAGE G3A/A1, GFR 45-59 AND ALBUMIN CREATININE RATIO <30 MG/G (HCC): ICD-10-CM

## 2022-12-06 DIAGNOSIS — N18.31 STAGE 3A CHRONIC KIDNEY DISEASE (HCC): ICD-10-CM

## 2022-12-06 DIAGNOSIS — N25.81 SECONDARY HYPERPARATHYROIDISM OF RENAL ORIGIN (HCC): ICD-10-CM

## 2022-12-06 DIAGNOSIS — I10 ESSENTIAL HYPERTENSION: ICD-10-CM

## 2022-12-06 DIAGNOSIS — N17.9 AKI (ACUTE KIDNEY INJURY) (HCC): ICD-10-CM

## 2022-12-06 RX ORDER — CALCITRIOL 0.25 UG/1
0.25 CAPSULE, LIQUID FILLED ORAL DAILY
Qty: 90 CAPSULE | Refills: 3 | Status: SHIPPED | OUTPATIENT
Start: 2022-12-06 | End: 2022-12-06 | Stop reason: SDUPTHER

## 2022-12-06 RX ORDER — TORSEMIDE 20 MG/1
20 TABLET ORAL DAILY
Qty: 90 TABLET | Refills: 3 | Status: SHIPPED | OUTPATIENT
Start: 2022-12-06 | End: 2023-03-06

## 2022-12-06 RX ORDER — CALCITRIOL 0.25 UG/1
0.25 CAPSULE, LIQUID FILLED ORAL DAILY
Qty: 90 CAPSULE | Refills: 3 | Status: SHIPPED | OUTPATIENT
Start: 2022-12-06

## 2022-12-06 RX ORDER — CANDESARTAN 16 MG/1
16 TABLET ORAL
Qty: 90 TABLET | Refills: 3 | Status: SHIPPED | OUTPATIENT
Start: 2022-12-06

## 2022-12-06 RX ORDER — CANDESARTAN 16 MG/1
16 TABLET ORAL
Qty: 90 TABLET | Refills: 3 | Status: SHIPPED | OUTPATIENT
Start: 2022-12-06 | End: 2022-12-06 | Stop reason: SDUPTHER

## 2022-12-06 RX ORDER — TORSEMIDE 20 MG/1
20 TABLET ORAL DAILY
Qty: 90 TABLET | Refills: 3 | Status: SHIPPED | OUTPATIENT
Start: 2022-12-06 | End: 2022-12-06 | Stop reason: SDUPTHER

## 2022-12-06 NOTE — TELEPHONE ENCOUNTER
rMedication Refills   Person Calling In  Name if not patient Patient    Medication name  CANDESARTAN    Medication Dosage  Medication Frequency 16 MG  [address] [facility]  [address]      Additional Information

## 2022-12-06 NOTE — TELEPHONE ENCOUNTER
rMedication Refills   Person Calling In  Name if not patient Patient    Medication name  TORSEMIDE   Medication Dosage  Medication Frequency [facility]  [address]      Additional Information

## 2022-12-06 NOTE — TELEPHONE ENCOUNTER
rMedication Refills   Person Calling In  Name if not patient Patient    Medication name  CALCITRIOL   Medication Dosage  Medication Frequency [address]  [address]      Additional Information

## 2022-12-20 ENCOUNTER — VBI (OUTPATIENT)
Dept: ADMINISTRATIVE | Facility: OTHER | Age: 82
End: 2022-12-20

## 2022-12-27 DIAGNOSIS — I10 ESSENTIAL HYPERTENSION: ICD-10-CM

## 2022-12-27 DIAGNOSIS — N17.9 AKI (ACUTE KIDNEY INJURY) (HCC): ICD-10-CM

## 2022-12-27 DIAGNOSIS — G47.00 INSOMNIA, UNSPECIFIED TYPE: ICD-10-CM

## 2022-12-27 DIAGNOSIS — M54.16 LUMBAR RADICULOPATHY: ICD-10-CM

## 2022-12-27 DIAGNOSIS — N18.31 CKD STAGE G3A/A1, GFR 45-59 AND ALBUMIN CREATININE RATIO <30 MG/G (HCC): ICD-10-CM

## 2022-12-27 DIAGNOSIS — J44.9 CHRONIC OBSTRUCTIVE PULMONARY DISEASE, UNSPECIFIED COPD TYPE (HCC): ICD-10-CM

## 2022-12-27 RX ORDER — ZOLPIDEM TARTRATE 6.25 MG/1
6.25 TABLET, FILM COATED, EXTENDED RELEASE ORAL
Qty: 30 TABLET | Refills: 0 | Status: SHIPPED | OUTPATIENT
Start: 2022-12-27

## 2022-12-27 RX ORDER — HYDROCODONE BITARTRATE AND ACETAMINOPHEN 5; 325 MG/1; MG/1
1 TABLET ORAL EVERY 6 HOURS PRN
Qty: 60 TABLET | Refills: 0 | Status: SHIPPED | OUTPATIENT
Start: 2022-12-27

## 2022-12-27 RX ORDER — ALBUTEROL SULFATE 90 UG/1
2 AEROSOL, METERED RESPIRATORY (INHALATION) EVERY 6 HOURS PRN
Qty: 8 G | Refills: 3 | Status: SHIPPED | OUTPATIENT
Start: 2022-12-27

## 2022-12-27 RX ORDER — CARVEDILOL 12.5 MG/1
12.5 TABLET ORAL 2 TIMES DAILY WITH MEALS
Qty: 180 TABLET | Refills: 3 | Status: SHIPPED | OUTPATIENT
Start: 2022-12-27 | End: 2023-12-27

## 2022-12-27 NOTE — TELEPHONE ENCOUNTER
1 [id number] 11/30/2022 11/30/2022 HYDROcodone BITARTRATE-ACETAMINOPHE (Tablet) 60 0 15 325 MG-5 MG 20 0 SALONI PRISCA [facility] Medicare 0 / 0 [place]    1 [id number] 11/30/2022 11/30/2022 Zolpidem Tartrate (Tablet, Extended Release) 30 0 30 6 25 MG NA [address] Medicare 0 / 0 [place]

## 2023-01-11 ENCOUNTER — VBI (OUTPATIENT)
Dept: ADMINISTRATIVE | Facility: OTHER | Age: 83
End: 2023-01-11

## 2023-01-30 DIAGNOSIS — G47.00 INSOMNIA, UNSPECIFIED TYPE: ICD-10-CM

## 2023-01-30 DIAGNOSIS — J30.89 ALLERGIC RHINITIS DUE TO OTHER ALLERGIC TRIGGER, UNSPECIFIED SEASONALITY: ICD-10-CM

## 2023-01-30 RX ORDER — ZOLPIDEM TARTRATE 6.25 MG/1
6.25 TABLET, FILM COATED, EXTENDED RELEASE ORAL
Qty: 30 TABLET | Refills: 0 | Status: SHIPPED | OUTPATIENT
Start: 2023-01-30

## 2023-01-30 RX ORDER — FLUTICASONE PROPIONATE 50 MCG
1 SPRAY, SUSPENSION (ML) NASAL DAILY
Qty: 16 G | Refills: 0 | Status: SHIPPED | OUTPATIENT
Start: 2023-01-30

## 2023-01-30 NOTE — TELEPHONE ENCOUNTER
[id number] 12/30/2022 12/27/2022 Zolpidem Tartrate (Tablet, Extended Release)  30 0 30 6 25 MG NA [address] [facility] Medicare 0 / 0 [place]     1  [id number] 12/28/2022 12/27/2022 ALPRAZolam (Tablet)  180 0 90 0 25 MG NA [facility] 0 / 0 [place]    1  [id number] 12/27/2022 12/27/2022 ACETAMINOPHEN 325 MG / HYDROcodone BITARTRATE 5 MG ORAL TABLET (Tablet)  60 0 15 5 0 MG/325 0 [facility]  [facility] Medicare 0 / 0 [place]    1  [id number] 11/30/2022 11/30/2022 ACETAMINOPHEN 325 MG / HYDROcodone BITARTRATE 5 MG ORAL TABLET (Tablet)  60 0 15 5 0 MG/325 0 MG 20 0 SALONI COPE  [facility]  Medicare

## 2023-02-07 ENCOUNTER — TELEPHONE (OUTPATIENT)
Dept: FAMILY MEDICINE CLINIC | Facility: CLINIC | Age: 83
End: 2023-02-07

## 2023-02-07 DIAGNOSIS — E13.22 OTHER SPECIFIED DIABETES MELLITUS WITH CHRONIC KIDNEY DISEASE, WITH LONG-TERM CURRENT USE OF INSULIN, UNSPECIFIED CKD STAGE (HCC): Primary | ICD-10-CM

## 2023-02-07 DIAGNOSIS — E03.9 HYPOTHYROIDISM, UNSPECIFIED TYPE: ICD-10-CM

## 2023-02-07 DIAGNOSIS — Z79.4 OTHER SPECIFIED DIABETES MELLITUS WITH CHRONIC KIDNEY DISEASE, WITH LONG-TERM CURRENT USE OF INSULIN, UNSPECIFIED CKD STAGE (HCC): Primary | ICD-10-CM

## 2023-02-07 NOTE — TELEPHONE ENCOUNTER
Patient called and will be getting blood work from cardiologist in the morning 2/15  Patient would also like to know if you will not need other bloodwork in addition to what was already ordered from cardiologist?    Ordered :  CMP  Lipid panel    If anything else is needed patient would like an order  Patient has an apt  Scheduled with you on March 29th  Pt # [phone] (would like a call back tomorrow morning 2/8/2023?

## 2023-02-28 DIAGNOSIS — M54.16 LUMBAR RADICULOPATHY: ICD-10-CM

## 2023-02-28 DIAGNOSIS — G47.00 INSOMNIA, UNSPECIFIED TYPE: ICD-10-CM

## 2023-02-28 RX ORDER — HYDROCODONE BITARTRATE AND ACETAMINOPHEN 5; 325 MG/1; MG/1
1 TABLET ORAL EVERY 6 HOURS PRN
Qty: 60 TABLET | Refills: 0 | Status: SHIPPED | OUTPATIENT
Start: 2023-02-28

## 2023-02-28 RX ORDER — ZOLPIDEM TARTRATE 6.25 MG/1
6.25 TABLET, FILM COATED, EXTENDED RELEASE ORAL
Qty: 30 TABLET | Refills: 0 | Status: SHIPPED | OUTPATIENT
Start: 2023-02-28

## 2023-02-28 NOTE — TELEPHONE ENCOUNTER
[id number] 01/30/2023 01/30/2023 Zolpidem Tartrate (Tablet, Extended Release)  30 0 30 6 25 MG NA SALONI COPE  [facility] [place] 0 / 0 [place]     1  [id number] 12/30/2022 12/27/2022 Zolpidem Tartrate (Tablet, Extended Release)  30 0 30 6 25 MG NA [address] [facility] Medicare 0 / 0 [place]    1  [id number] 12/28/2022 12/27/2022 ALPRAZolam (Tablet)  180 0 90 0 25 MG NA HILTON LIND  [facility] Medicare 0 / 0 [place]    1  [id number] 12/27/2022 12/27/2022 ACETAMINOPHEN 325 MG / HYDROcodone BITARTRATE 5 MG ORAL TABLET (Tablet)  60 0 15 5 0 MG/325 0 MG 20 0 RUTH [address] Medicare 0 / 0

## 2023-03-13 ENCOUNTER — RA CDI HCC (OUTPATIENT)
Dept: OTHER | Facility: HOSPITAL | Age: 83
End: 2023-03-13

## 2023-03-13 NOTE — PROGRESS NOTES
[facility]  coding opportunities          Chart Reviewed number of suggestions sent to Provider: 2     Patients Insurance     Medicare Insurance: [employer]          [id number],   [id number] Patient called into Lung Screening Program questioning my note that was put in yesterday. I explained that I review lung screening orders to make sure that patient meets criteria and note that the order was reviewed. She states that it should be diagnostic as it is being used to checked the pain that she had in her breast/chest since radiation on her breast. I explained that based on the order that was put in,  she meets criteria for lung screening. Again she states that it should be diagnostic as it is to be checking the chest/breast pain that she has had for the past year. She asked if the code could just be changed since it is for checking for a problem because she was told that she could be billed $166.00. I explained that changing the code to the problem that she would no longer meet criteria for Lung screening and that if she is looking for other testing to be done to contact the office. She states that at her appointment that she was told she could have the Lung Screening or a chest xray as they would show the same thing. She states that she will be contacting the office. Again the order for CT Lung Screening does meet criteria as ordered. CT LUNG SCREENING CRITERIA   Patient is between the ages of 49-80.  Is currently smoking or is a former smoker who has quit smoking within the last 15 years   Has at least a 20 pack-year smoking history (regardless of patient being a current or former smoker). Pack history is packs per day times years smoked equals pack history. IE:  1 pack a day X 20 years = 20 pack year history.  Has not had a CT lung screening or any CT chest exam within the last year   Patient is asymptomatic (no signs/symptoms of lung cancer such as shortness of breath, cough, coughing up blood or unexplained significant weight loss).    This patient does not have a condition that limits life expectancy to <5 years   This patient has participated in a shared decision-making session during which potential risks and benefits of CT Lung Screening were discussed.  This patient was informed of the importance of adherence to annual screening, impact of comorbidities, and ability/willingness to undergo diagnosis and treatment.  This patient was informed of the importance of smoking cessation and/or maintaining smoking abstinence. If applicable, this patient was offered information on smoking cessation counseling services. ORDER MUST INCLUDE: THIS INFORMATION IS AUTOMATICALLY ON ORDER IF ENTERED IN EPIC   PROVIDER'S NPI - AUTOMATICALLY APPEARS ON ORDER WHEN SIGNED BY THE PROVIDER.  PACK HISTORY    QUIT DATE IF THEY HAVE QUIT   DIAGNOSIS: Z87.891 PERSONAL HISTORY OF TABACCO USE OR PERSONAL HISTORY OF NICOTINE DEPENDENCE. (BOTH HAS THE SAME Z CODE) THIS IS REQUIRED FOR MEDICARE/MEDICAID.  FOR PRIVATE INSURANCES DIAGNOSIS CODE CAN BE: Z12.2 ENCOUNTER FOR SCREENING FOR MALIGNANT NEOPLASM OF RESPIRATORY ORGANS OR Z87.891 PERSONAL HISTORY OF TABACCO USE OR PERSONAL HISTORY OF NICOTINE DEPENDENCE. Initial Size Of Lesion: 1

## 2023-03-24 DIAGNOSIS — F32.9 REACTIVE DEPRESSION: ICD-10-CM

## 2023-03-24 RX ORDER — ESCITALOPRAM OXALATE 10 MG/1
TABLET ORAL
Qty: 30 TABLET | Refills: 5 | Status: SHIPPED | OUTPATIENT
Start: 2023-03-24

## 2023-03-29 ENCOUNTER — OFFICE VISIT (OUTPATIENT)
Dept: FAMILY MEDICINE CLINIC | Facility: CLINIC | Age: 83
End: 2023-03-29

## 2023-03-29 VITALS
OXYGEN SATURATION: 95 % | DIASTOLIC BLOOD PRESSURE: 86 MMHG | WEIGHT: 175.4 LBS | HEIGHT: 64 IN | BODY MASS INDEX: 29.94 KG/M2 | TEMPERATURE: 97.5 F | SYSTOLIC BLOOD PRESSURE: 188 MMHG | HEART RATE: 72 BPM

## 2023-03-29 DIAGNOSIS — Z00.00 MEDICARE ANNUAL WELLNESS VISIT, SUBSEQUENT: Primary | ICD-10-CM

## 2023-03-29 DIAGNOSIS — G47.00 INSOMNIA, UNSPECIFIED TYPE: ICD-10-CM

## 2023-03-29 DIAGNOSIS — E11.42 TYPE 2 DIABETES MELLITUS WITH DIABETIC POLYNEUROPATHY, WITHOUT LONG-TERM CURRENT USE OF INSULIN (HCC): ICD-10-CM

## 2023-03-29 DIAGNOSIS — F11.20 CONTINUOUS OPIOID DEPENDENCE (HCC): ICD-10-CM

## 2023-03-29 DIAGNOSIS — N18.31 CKD STAGE G3A/A1, GFR 45-59 AND ALBUMIN CREATININE RATIO <30 MG/G (HCC): ICD-10-CM

## 2023-03-29 DIAGNOSIS — N25.81 SECONDARY HYPERPARATHYROIDISM OF RENAL ORIGIN (HCC): ICD-10-CM

## 2023-03-29 DIAGNOSIS — I50.33 ACUTE ON CHRONIC DIASTOLIC (CONGESTIVE) HEART FAILURE (HCC): ICD-10-CM

## 2023-03-29 DIAGNOSIS — Z72.0 TOBACCO ABUSE: ICD-10-CM

## 2023-03-29 DIAGNOSIS — I73.9 PAD (PERIPHERAL ARTERY DISEASE) (HCC): ICD-10-CM

## 2023-03-29 DIAGNOSIS — E78.00 HYPERCHOLESTEROLEMIA: ICD-10-CM

## 2023-03-29 DIAGNOSIS — E03.9 HYPOTHYROIDISM, UNSPECIFIED TYPE: ICD-10-CM

## 2023-03-29 DIAGNOSIS — E13.22 OTHER SPECIFIED DIABETES MELLITUS WITH DIABETIC CHRONIC KIDNEY DISEASE, UNSPECIFIED CKD STAGE, UNSPECIFIED WHETHER LONG TERM INSULIN USE (HCC): ICD-10-CM

## 2023-03-29 DIAGNOSIS — I48.91 ATRIAL FIBRILLATION, UNSPECIFIED TYPE (HCC): ICD-10-CM

## 2023-03-29 LAB
CREAT UR-MCNC: 53.3 MG/DL
MICROALBUMIN UR-MCNC: 51.7 MG/L (ref 0–20)
MICROALBUMIN/CREAT 24H UR: 97 MG/G CREATININE (ref 0–30)
SL AMB POCT HEMOGLOBIN AIC: 6.4 (ref ?–6.5)

## 2023-03-29 RX ORDER — ZOLPIDEM TARTRATE 6.25 MG/1
6.25 TABLET, FILM COATED, EXTENDED RELEASE ORAL
Qty: 30 TABLET | Refills: 0 | Status: SHIPPED | OUTPATIENT
Start: 2023-03-29

## 2023-03-29 RX ORDER — VARENICLINE TARTRATE 25 MG
KIT ORAL
Qty: 53 EACH | Refills: 0 | Status: SHIPPED | OUTPATIENT
Start: 2023-03-29

## 2023-03-29 NOTE — PROGRESS NOTES
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 ([facility] [address] ) - Primary    Relevant Orders    POCT hemoglobin A1c    Microalbumin / creatinine urine ratio        Preventive health issues were discussed with patient, and age appropriate screening tests were ordered as noted in patient's After Visit Summary  Personalized health advice and appropriate referrals for health education or preventive services given if needed, as noted in patient's After Visit Summary  History of Present Illness:     Patient presents for a Medicare Wellness Visit  Her niddm, copd, CHF, afib, and ckd are all stable  His insomnia is not well controlled   She cannot stay awake  She is also smoking again, and  wouild like chantix  (smoking less than 1/2 ppd )    HPI   Patient Care Team:  Iliana Jamison DO as PCP - General  Iliana Jamison DO as PCP - PCP-[facility]  Seun Pierre DO     Review of Systems:     Review of Systems   Constitutional: Negative for appetite change, chills and fever  HENT: Negative for ear pain, facial swelling, rhinorrhea, sinus pain, sore throat and trouble swallowing  Eyes: Negative for discharge and redness  Respiratory: Negative for chest tightness, shortness of breath and wheezing  Cardiovascular: Negative for chest pain and palpitations  Gastrointestinal: Negative for abdominal pain, diarrhea, nausea and vomiting  Endocrine: Negative for polyuria  Genitourinary: Negative for dysuria and urgency  Musculoskeletal: Negative for arthralgias and back pain  Skin: Negative for rash  Neurological: Negative for dizziness, weakness and headaches  Hematological: Negative for adenopathy  Psychiatric/Behavioral: Negative for behavioral problems, confusion and sleep disturbance  All other systems reviewed and are negative         Problem List: Patient Active Problem List   Diagnosis   • 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 ([place] [address] )   • Anxiety   • Bilateral pneumonia   • Centrilobular emphysema ([place] [address] )   • CHF (congestive heart failure) ([place] [address] )   • Chronic airway obstruction (HCC)   • Coronary atherosclerosis   • GERD without esophagitis   • 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, or chronic kidney disease ([place] [address] )   • Hypercholesterolemia   • Hypothyroidism   • Lumbar radiculopathy   • Obesity (BMI 30 0-34  9)   • Osteopenia   • PAD (peripheral artery disease) ([facility])   • Tobacco abuse   • Unspecified atrial fibrillation ([facility])   • Stage 3a chronic kidney disease ([facility])   • Secondary hyperparathyroidism of renal origin ([place] [address] )   • Vitamin D deficiency   • Other specified diabetes mellitus with diabetic peripheral angiopathy without gangrene ([facility])   • Hypertension   • Continuous opioid dependence ([place] [address] )   • Acute on chronic diastolic (congestive) heart failure ([facility])      Past Medical and Surgical History:     Past Medical History:   Diagnosis Date   • Coronary artery disease    • Hyperlipidemia    • Hypertension    • Peripheral vascular disease ([place] [address] )      Past Surgical History:   Procedure Laterality Date   • CARDIAC SURGERY     • HYSTERECTOMY      age 32   • OOPHORECTOMY Right     age 32   • SMALL INTESTINE SURGERY      6 inches   • TONSILLECTOMY     • US GUIDED BREAST BIOPSY RIGHT COMPLETE Right 4/24/2019      Family History:     Family History   Problem Relation Age of Onset   • Hypertension Father         essential    • Heart disease Father    • Kidney disease Mother    • Diabetes Mother       Social History:     Social History     Socioeconomic History   • Marital status:       Spouse name: Not on file   • Number of children: 4   • Years of education: less than high school   • Highest education level: Not on file   Occupational History   • Not on file   Tobacco Use   • Smoking status: Former     Types: Cigarettes     Quit date: 3/1/2019     Years since quittin 0   • Smokeless tobacco: Never   Substance and Sexual Activity   • Alcohol use: No   • Drug use: Never   • Sexual activity: Not Currently   Other Topics Concern   • Not on file   Social History Narrative    Daily coffee consumption:    Daily cola consumption:     Tea    Water intake, adequate (per day)     Social Determinants of Health     Financial Resource Strain: Not on file   Food Insecurity: Not on file   Transportation Needs: Not on file   Physical Activity: Not on file   Stress: Not on file   Social Connections: Not on file   Intimate Partner Violence: Not on file   Housing Stability: Not on file      Medications and Allergies:     Current Outpatient Medications   Medication Sig Dispense Refill   • acetaminophen (TYLENOL) 500 mg tablet Take 500 mg by mouth every 6 (six) hours     • albuterol (2 5 mg/3 mL) 0 083 % nebulizer solution Take 3 mL (2 5 mg total) by nebulization every 6 (six) hours as needed for wheezing or shortness of breath 300 mL 3   • albuterol (PROVENTIL HFA,VENTOLIN HFA) 90 mcg/act inhaler Inhale 2 puffs every 6 (six) hours as needed for wheezing or shortness of breath 8 g 3   • ALPRAZolam (XANAX) 0 25 mg tablet take 1 tablet by mouth twice a day if needed for anxiety 180 tablet 0   • aspirin (ECOTRIN LOW STRENGTH) 81 mg EC tablet Take by mouth     • budesonide-formoterol (Symbicort) 160-4 5 mcg/act inhaler Inhale 2 puffs 2 (two) times a day Rinse mouth after use   6 g 3   • calcitriol (ROCALTROL) 0 25 mcg capsule Take 1 capsule (0 25 mcg total) by mouth daily 90 capsule 3   • candesartan (ATACAND) 16 mg tablet Take 1 tablet (16 mg total) by mouth daily at bedtime 90 tablet 3   • carvedilol (COREG) 12 5 mg tablet Take 1 tablet (12 5 mg total) by mouth 2 (two) times a day with meals 180 tablet 3   • cholecalciferol (VITAMIN D3) 1,000 units tablet Take 1,000 Units by mouth daily     • clopidogrel (PLAVIX) 75 mg tablet Take 75 mg by mouth daily   0   • dextromethorphan-guaifenesin (MUCINEX DM)  MG per 12 hr tablet Take 1 tablet by mouth every 12 (twelve) hours     • Diclofenac Sodium (VOLTAREN) 1 % Apply 2 g topically 4 (four) times a day 100 g 3   • escitalopram (LEXAPRO) 10 mg tablet take 1 tablet by mouth once daily 30 tablet 5   • fluticasone (FLONASE) 50 mcg/act nasal spray 1 spray into each nostril daily 16 g 0   • HYDROcodone-acetaminophen (NORCO) 5-325 mg per tablet Take 1 tablet by mouth every 6 (six) hours as needed for pain Max Daily Amount: 4 tablets 60 tablet 0   • levothyroxine (Synthroid) 75 mcg tablet Take 1 tablet (75 mcg total) by mouth daily in the early morning (Patient not taking: Reported on 9/28/2022) 30 tablet 5   • levothyroxine 100 mcg tablet Take 1 tablet (100 mcg total) by mouth daily in the early morning 90 tablet 1   • loratadine (CLARITIN) 10 mg tablet Take 10 mg by mouth daily     • niacin (NIASPAN) 1000 MG CR tablet Take 1,000 mg by mouth     • pantoprazole (PROTONIX) 40 mg tablet Take 1 tablet (40 mg total) by mouth 2 (two) times a day 180 tablet 3   • rosuvastatin (CRESTOR) 40 MG tablet Take 40 mg by mouth daily     • senna (SENOKOT) 8 6 MG tablet Take 1 tablet by mouth daily     • tiotropium (Spiriva HandiHaler) 18 mcg inhalation capsule Place 1 capsule (18 mcg total) into inhaler and inhale in the morning  30 capsule 3   • torsemide (DEMADEX) 20 mg tablet Take 1 tablet (20 mg total) by mouth daily 90 tablet 3   • zolpidem (AMBIEN CR) 6 25 MG CR tablet Take 1 tablet (6 25 mg total) by mouth daily at bedtime as needed for sleep 30 tablet 0     No current facility-administered medications for this visit       No Known Allergies   Immunizations:     Immunization History   Administered Date(s) Administered   • COVID-19 J&J (Shiloh) vaccine 0 5 mL 03/12/2021, 12/28/2021   • INFLUENZA 09/29/2018   • Influenza Split High Dose Preservative Free IM 09/20/2011, 10/22/2013, 10/07/2014, 11/10/2015, 11/21/2016, 10/20/2017   • Influenza, high dose seasonal 0 7 mL 09/25/2019, 10/07/2020, 11/15/2021, 09/28/2022   • Influenza, seasonal, injectable 10/24/2008, 10/14/2009, 11/04/2010   • Pneumococcal Conjugate 13-Valent 11/10/2015   • Pneumococcal Polysaccharide PPV23 11/23/2007, 10/23/2014   • Tdap 11/04/2010      Health Maintenance:         Topic Date Due   • DXA SCAN  02/20/2020         Topic Date Due   • COVID-19 Vaccine (3 - Booster for Shiloh series) 02/22/2022      Medicare Screening Tests and Risk Assessments:     Shahriar Fofana is here for her Subsequent Wellness visit  Last Medicare Wellness visit information reviewed, patient interviewed, no change since last AWV  Health Risk Assessment:   Patient rates overall health as good  Patient feels that their physical health rating is slightly better  Patient is satisfied with their life  Eyesight was rated as slightly worse  Hearing was rated as slightly worse  Patient feels that their emotional and mental health rating is same  Patients states they are never, rarely angry  Patient states they are sometimes unusually tired/fatigued  Pain experienced in the last 7 days has been none  Patient states that she has experienced no weight loss or gain in last 6 months  Depression Screening:   PHQ-2 Score: 0      Fall Risk Screening: In the past year, patient has experienced: no history of falling in past year      Urinary Incontinence Screening:   Patient has not leaked urine accidently in the last six months  Home Safety:  Patient has trouble with stairs inside or outside of their home  Patient has working smoke alarms and has no working carbon monoxide detector  Home safety hazards include: none  Nutrition:   Current diet is Diabetic and Limited junk food  Medications:   Patient is currently taking over-the-counter supplements   OTC medications include: see medication list  Patient is able to manage medications  Activities of Daily Living (ADLs)/Instrumental Activities of Daily Living (IADLs):   Walk and transfer into and out of bed and chair?: Yes  Dress and groom yourself?: Yes    Bathe or shower yourself?: Yes    Feed yourself? Yes  Do your laundry/housekeeping?: Yes  Manage your money, pay your bills and track your expenses?: Yes  Make your own meals?: Yes    Do your own shopping?: Yes    Previous Hospitalizations:   Any hospitalizations or ED visits within the last 12 months?: No      Advance Care Planning:   Living will: No    Durable POA for healthcare:  Yes    Advanced directive: Yes    Advanced directive counseling given: Yes    Five wishes given: Yes    Patient declined ACP directive: No    End of Life Decisions reviewed with patient: Yes    Provider agrees with end of life decisions: Yes      Cognitive Screening:   Provider or family/friend/caregiver concerned regarding cognition?: No    PREVENTIVE SCREENINGS      Cardiovascular Screening:    General: Screening Not Indicated and History Lipid Disorder      Diabetes Screening:     General: Screening Not Indicated and History Diabetes      Colorectal Cancer Screening:     General: Screening Current      Cervical Cancer Screening:    General: Screening Not Indicated      Osteoporosis Screening:    General: Screening Current      Abdominal Aortic Aneurysm (AAA) Screening:        General: Screening Current and Screening Not Indicated      Lung Cancer Screening:     General: Screening Not Indicated      Hepatitis C Screening:    General: Screening Current    Screening, Brief Intervention, and Referral to Treatment (SBIRT)    Screening    Typical number of drinks in a week: 0    Single Item Drug Screening:  How often have you used an illegal drug (including marijuana) or a prescription medication for non-medical reasons in the past year? never    Single Item Drug Screen Score: 0  Interpretation: Negative screen for possible drug use disorder    No "results found  Physical Exam:     Ht 5' 4\" (1 626 m)   BMI 30 52 kg/m²     Physical Exam  Vitals and nursing note reviewed  Constitutional:       General: She is not in acute distress  Appearance: Normal appearance  She is not ill-appearing or diaphoretic  HENT:      Head: Normocephalic and atraumatic  Right Ear: Tympanic membrane, ear canal and external ear normal       Left Ear: Tympanic membrane, ear canal and external ear normal       Nose: Nose normal  No congestion or rhinorrhea  Mouth/Throat:      Mouth: Mucous membranes are moist       Pharynx: Oropharynx is clear  No posterior oropharyngeal erythema  Eyes:      General:         Right eye: No discharge  Left eye: No discharge  Extraocular Movements: Extraocular movements intact  Conjunctiva/sclera: Conjunctivae normal       Pupils: Pupils are equal, round, and reactive to light  Neck:      Vascular: No carotid bruit  Cardiovascular:      Rate and Rhythm: Normal rate and regular rhythm  Pulses: Normal pulses  Heart sounds: Normal heart sounds  No murmur heard  Pulmonary:      Effort: Pulmonary effort is normal  No respiratory distress  Breath sounds: Normal breath sounds  No wheezing or rhonchi  Abdominal:      General: Abdomen is flat  Bowel sounds are normal  There is no distension  Palpations: There is no mass  Tenderness: There is no abdominal tenderness  Musculoskeletal:         General: No swelling or deformity  Normal range of motion  Cervical back: Normal range of motion and neck supple  No rigidity  Right lower leg: No edema  Left lower leg: No edema  Lymphadenopathy:      Cervical: No cervical adenopathy  Skin:     General: Skin is warm and dry  Capillary Refill: Capillary refill takes less than 2 seconds  Coloration: Skin is not jaundiced  Findings: No bruising, erythema or rash  Neurological:      General: No focal deficit present        " Mental Status: She is alert and oriented to person, place, and time  Cranial Nerves: No cranial nerve deficit  Sensory: No sensory deficit  Gait: Gait normal       Deep Tendon Reflexes: Reflexes normal    Psychiatric:         Mood and Affect: Mood normal          Behavior: Behavior normal          Thought Content:  Thought content normal          Judgment: Judgment normal           Iliana Jamison DO

## 2023-03-29 NOTE — PATIENT INSTRUCTIONS
Medicare Preventive Visit Patient Instructions  Thank you for completing your Welcome to Medicare Visit or Medicare Annual Wellness Visit today  Your next wellness visit will be due in one year (3/29/2024)  The screening/preventive services that you may require over the next 5-10 years are detailed below  Some tests may not apply to you based off risk factors and/or age  Screening tests ordered at today's visit but not completed yet may show as past due  Also, please note that scanned in results may not display below  Preventive Screenings:  Service Recommendations Previous Testing/Comments   Colorectal Cancer Screening  * Colonoscopy    * Fecal Occult Blood Test (FOBT)/Fecal Immunochemical Test (FIT)  * Fecal DNA/Cologuard Test  * Flexible Sigmoidoscopy Age: 39-70 years old   Colonoscopy: every 10 years (may be performed more frequently if at higher risk)  OR  FOBT/FIT: every 1 year  OR  Cologuard: every 3 years  OR  Sigmoidoscopy: every 5 years  Screening may be recommended earlier than age 39 if at higher risk for colorectal cancer  Also, an individualized decision between you and your healthcare provider will decide whether screening between the ages of 74-80 would be appropriate  Colonoscopy: Not on file  FOBT/FIT: Not on file  Cologuard: Not on file  Sigmoidoscopy: Not on file          Breast Cancer Screening Age: 36 years old  Frequency: every 1-2 years  Not required if history of left and right mastectomy Mammogram: 04/17/2019        Cervical Cancer Screening Between the ages of 21-29, pap smear recommended once every 3 years  Between the ages of 33-67, can perform pap smear with HPV co-testing every 5 years     Recommendations may differ for women with a history of total hysterectomy, cervical cancer, or abnormal pap smears in past  Pap Smear: Not on file    Screening Not Indicated   Hepatitis C Screening Once for adults born between [facility]  More frequently in patients at high risk for Hepatitis C Hep C Antibody: Not on file        Diabetes Screening 1-2 times per year if you're at risk for diabetes or have pre-diabetes Fasting glucose: 90 mg/dL (9/28/2022)  A1C: 6 4 (9/28/2022)  Screening Not Indicated  History Diabetes   Cholesterol Screening Once every 5 years if you don't have a lipid disorder  May order more often based on risk factors  Lipid panel: 02/15/2023    Screening Not Indicated  History Lipid Disorder     Other Preventive Screenings Covered by Medicare:  1  Abdominal Aortic Aneurysm (AAA) Screening: covered once if your at risk  You're considered to be at risk if you have a family history of AAA  2  Lung Cancer Screening: covers low dose CT scan once per year if you meet all of the following conditions: (1) Age 50-69; (2) No signs or symptoms of lung cancer; (3) Current smoker or have quit smoking within the last 15 years; (4) You have a tobacco smoking history of at least 20 pack years (packs per day multiplied by number of years you smoked); (5) You get a written order from a healthcare provider  3  Glaucoma Screening: covered annually if you're considered high risk: (1) You have diabetes OR (2) Family history of glaucoma OR (3)  aged 48 and older OR (3)  American aged 72 and older  3  Osteoporosis Screening: covered every 2 years if you meet one of the following conditions: (1) You're estrogen deficient and at risk for osteoporosis based off medical history and other findings; (2) Have a vertebral abnormality; (3) On glucocorticoid therapy for more than 3 months; (4) Have primary hyperparathyroidism; (5) On osteoporosis medications and need to assess response to drug therapy  · Last bone density test (DXA Scan): 02/20/2017  5  HIV Screening: covered annually if you're between the age of 12-76  Also covered annually if you are younger than 13 and older than 72 with risk factors for HIV infection   For pregnant patients, it is covered up to 3 times per pregnancy  Immunizations:  Immunization Recommendations   Influenza Vaccine Annual influenza vaccination during flu season is recommended for all persons aged >= 6 months who do not have contraindications   Pneumococcal Vaccine   * Pneumococcal conjugate vaccine = PCV13 (Prevnar 13), PCV15 (Vaxneuvance), PCV20 (Prevnar 20)  * Pneumococcal polysaccharide vaccine = PPSV23 (Pneumovax) Adults 25-60 years old: 1-3 doses may be recommended based on certain risk factors  Adults 72 years old: 1-2 doses may be recommended based off what pneumonia vaccine you previously received   Hepatitis B Vaccine 3 dose series if at intermediate or high risk (ex: diabetes, end stage renal disease, liver disease)   Tetanus (Td) Vaccine - COST NOT COVERED BY MEDICARE PART B Following completion of primary series, a booster dose should be given every 10 years to maintain immunity against tetanus  Td may also be given as tetanus wound prophylaxis  Tdap Vaccine - COST NOT COVERED BY MEDICARE PART B Recommended at least once for all adults  For pregnant patients, recommended with each pregnancy  Shingles Vaccine (Shingrix) - COST NOT COVERED BY MEDICARE PART B  2 shot series recommended in those aged 48 and above     Health Maintenance Due:      Topic Date Due   • DXA SCAN  02/20/2020     Immunizations Due:      Topic Date Due   • COVID-19 Vaccine (3 - Booster for [employer] [facility] series) 02/22/2022     Advance Directives   What are advance directives? Advance directives are legal documents that state your wishes and plans for medical care  These plans are made ahead of time in case you lose your ability to make decisions for yourself  Advance directives can apply to any medical decision, such as the treatments you want, and if you want to donate organs  What are the types of advance directives? There are many types of advance directives, and each state has rules about how to use them   You may choose a combination of any of the following:  · Living will: This is a written record of the treatment you want  You can also choose which treatments you do not want, which to limit, and which to stop at a certain time  This includes surgery, medicine, IV fluid, and tube feedings  · Durable power of  for healthcare [facility]): This is a written record that states who you want to make healthcare choices for you when you are unable to make them for yourself  This person, called a proxy, is usually a family member or a friend  You may choose more than 1 proxy  · Do not resuscitate (DNR) order:  A DNR order is used in case your heart stops beating or you stop breathing  It is a request not to have certain forms of treatment, such as CPR  A DNR order may be included in other types of advance directives  · Medical directive: This covers the care that you want if you are in a coma, near death, or unable to make decisions for yourself  You can list the treatments you want for each condition  Treatment may include pain medicine, surgery, blood transfusions, dialysis, IV or tube feedings, and a ventilator (breathing machine)  · Values history: This document has questions about your views, beliefs, and how you feel and think about life  This information can help others choose the care that you would choose  Why are advance directives important? An advance directive helps you control your care  Although spoken wishes may be used, it is better to have your wishes written down  Spoken wishes can be misunderstood, or not followed  Treatments may be given even if you do not want them  An advance directive may make it easier for your family to make difficult choices about your care  Cigarette Smoking and Your Health   Risks to your health if you smoke:  Nicotine and other chemicals found in tobacco damage every cell in your body  Even if you are a light smoker, you have an increased risk for cancer, heart disease, and lung disease   If you are pregnant or have diabetes, smoking increases your risk for complications  Benefits to your health if you stop smoking:   · You decrease respiratory symptoms such as coughing, wheezing, and shortness of breath  · You reduce your risk for cancers of the lung, mouth, throat, kidney, bladder, pancreas, stomach, and cervix  If you already have cancer, you increase the benefits of chemotherapy  You also reduce your risk for cancer returning or a second cancer from developing  · You reduce your risk for heart disease, blood clots, heart attack, and stroke  · You reduce your risk for lung infections, and diseases such as pneumonia, asthma, chronic bronchitis, and emphysema  · Your circulation improves  More oxygen can be delivered to your body  If you have diabetes, you lower your risk for complications, such as kidney, artery, and eye diseases  You also lower your risk for nerve damage  Nerve damage can lead to amputations, poor vision, and blindness  · You improve your body's ability to heal and to fight infections  For more information and support to stop smoking:   · [employer]  Phone: 8- 922 - [phone]  Web Address: [employer]  Weight Management   Why it is important to manage your weight:  Being overweight increases your risk of health conditions such as heart disease, high blood pressure, type 2 diabetes, and certain types of cancer  It can also increase your risk for osteoarthritis, sleep apnea, and other respiratory problems  Aim for a slow, steady weight loss  Even a small amount of weight loss can lower your risk of health problems  How to lose weight safely:  A safe and healthy way to lose weight is to eat fewer calories and get regular exercise  You can lose up about 1 pound a week by decreasing the number of calories you eat by 500 calories each day     Healthy meal plan for weight management:  A healthy meal plan includes a variety of foods, contains fewer calories, and helps you stay healthy  A healthy meal plan includes the following:  · Eat whole-grain foods more often  A healthy meal plan should contain fiber  Fiber is the part of grains, fruits, and vegetables that is not broken down by your body  Whole-grain foods are healthy and provide extra fiber in your diet  Some examples of whole-grain foods are whole-wheat breads and pastas, oatmeal, brown rice, and bulgur  · Eat a variety of vegetables every day  Include dark, leafy greens such as spinach, kale, deny greens, and mustard greens  Eat yellow and orange vegetables such as carrots, sweet potatoes, and winter squash  · Eat a variety of fruits every day  Choose fresh or canned fruit (canned in its own juice or light syrup) instead of juice  Fruit juice has very little or no fiber  · Eat low-fat dairy foods  Drink fat-free (skim) milk or 1% milk  Eat fat-free yogurt and low-fat cottage cheese  Try low-fat cheeses such as mozzarella and other reduced-fat cheeses  · Choose meat and other protein foods that are low in fat  Choose beans or other legumes such as split peas or lentils  Choose fish, skinless poultry (chicken or turkey), or lean cuts of red meat (beef or pork)  Before you cook meat or poultry, cut off any visible fat  · Use less fat and oil  Try baking foods instead of frying them  Add less fat, such as margarine, sour cream, regular salad dressing and mayonnaise to foods  Eat fewer high-fat foods  Some examples of high-fat foods include french fries, doughnuts, ice cream, and cakes  · Eat fewer sweets  Limit foods and drinks that are high in sugar  This includes candy, cookies, regular soda, and sweetened drinks  Exercise:  Exercise at least 30 minutes per day on most days of the week  Some examples of exercise include walking, biking, dancing, and swimming  You can also fit in more physical activity by taking the stairs instead of the elevator or parking farther away from stores   Ask your healthcare provider about the best exercise plan for you  © Copyright [employer] 2018 Information is for End User's use only and may not be sold, redistributed or otherwise used for commercial purposes   All illustrations and images included in CareNotes® are the copyrighted property of A D A M , Inc  or [address] [place], [place] [address]ishmael [email or web address]

## 2023-04-24 ENCOUNTER — TELEPHONE (OUTPATIENT)
Dept: OTHER | Facility: OTHER | Age: 83
End: 2023-04-24

## 2023-04-24 NOTE — TELEPHONE ENCOUNTER
Talya amaya/ Jarrell called  States that evaluated patient In home for clogged feeling on her right ear, denies pain, mild allergies symptoms of scratchy throat, post nasal drip, and productive cough clear  States gave patient guidance on home intervention to manage and advice to callback if not getting better  Also, states this is an FYI message, no reason for callback

## 2023-05-01 DIAGNOSIS — F41.9 ANXIETY: ICD-10-CM

## 2023-05-01 DIAGNOSIS — G47.00 INSOMNIA, UNSPECIFIED TYPE: ICD-10-CM

## 2023-05-01 RX ORDER — ZOLPIDEM TARTRATE 6.25 MG/1
6.25 TABLET, FILM COATED, EXTENDED RELEASE ORAL
Qty: 30 TABLET | Refills: 0 | Status: SHIPPED | OUTPATIENT
Start: 2023-05-01

## 2023-05-01 RX ORDER — ALPRAZOLAM 0.25 MG/1
0.25 TABLET ORAL 2 TIMES DAILY PRN
Qty: 180 TABLET | Refills: 0 | Status: SHIPPED | OUTPATIENT
Start: 2023-05-01

## 2023-05-01 NOTE — TELEPHONE ENCOUNTER
[id number] 04/12/2023 04/12/2023 ACETAMINOPHEN 325 MG / HYDROcodone BITARTRATE 5 MG ORAL TABLET (Tablet)  60 0 15 5 0 MG/325 0 MG NA SALONI BROADT  [facility] [place] 0 / 0 [place]     1  [id number] 03/30/2023 03/29/2023 Zolpidem Tartrate (Tablet, Extended Release)  30 0 30 6 25 MG NA SALONI BROADT  [facility] [place] 0 / 0 [place]    1  [id number] 02/28/2023 02/28/2023 ACETAMINOPHEN 325 MG / HYDROcodone BITARTRATE 5 MG ORAL TABLET (Tablet)  60 0 15 5 0 MG/325 0 MG NA SALOIN BROADT  [facility] [place] 0 / 0 [place]    1  [id number] 02/28/2023 02/28/2023 Zolpidem Tartrate (Tablet, Extended Release)  30 0 30 6 25 MG NA [id number] Medicare

## 2023-05-22 DIAGNOSIS — E03.9 HYPOTHYROIDISM, UNSPECIFIED TYPE: ICD-10-CM

## 2023-05-22 DIAGNOSIS — Z72.0 TOBACCO ABUSE: ICD-10-CM

## 2023-05-22 RX ORDER — LEVOTHYROXINE SODIUM 0.1 MG/1
100 TABLET ORAL
Qty: 90 TABLET | Refills: 1 | Status: SHIPPED | OUTPATIENT
Start: 2023-05-22

## 2023-05-22 RX ORDER — VARENICLINE TARTRATE 25 MG
KIT ORAL
Qty: 53 EACH | Refills: 0 | Status: SHIPPED | OUTPATIENT
Start: 2023-05-22

## 2023-06-02 ENCOUNTER — TELEPHONE (OUTPATIENT)
Dept: LAB | Facility: HOSPITAL | Age: 83
End: 2023-06-02

## 2023-06-05 DIAGNOSIS — M54.16 LUMBAR RADICULOPATHY: ICD-10-CM

## 2023-06-06 ENCOUNTER — TELEPHONE (OUTPATIENT)
Dept: LAB | Facility: HOSPITAL | Age: 83
End: 2023-06-06

## 2023-06-06 RX ORDER — HYDROCODONE BITARTRATE AND ACETAMINOPHEN 5; 325 MG/1; MG/1
1 TABLET ORAL EVERY 6 HOURS PRN
Qty: 60 TABLET | Refills: 0 | Status: SHIPPED | OUTPATIENT
Start: 2023-06-06

## 2023-06-06 NOTE — TELEPHONE ENCOUNTER
1 [id number] 05/01/2023 05/01/2023 ALPRAZolam (Tablet) 180 0 90 0 25 MG NA SALONI BROADT [facility] [place] 0 / 0 [place]    1 [id number] 05/01/2023 05/01/2023 Zolpidem Tartrate (Tablet, Extended Release) 30 0 30 6 25 MG NA SALONI BROADT [facility] / 0 [place]    1 [id number] 04/12/2023 04/12/2023 ACETAMINOPHEN 325 MG / HYDROcodone BITARTRATE 5 MG ORAL TABLET (Tablet) 60 0 15 5 0 MG/325 0 MG NA SALONI BROADT [facility] Medicare 0 / 0 [place]    1 [id number] 03/30/2023 03/29/2023 Zolpidem Tartrate (Tablet, Extended Release) 30 0 30 6 25 MG NA SALONI BROADT [facility] [place] 0 / 0 [place]    1 [id number] 02/28/2023 02/28/2023 ACETAMINOPHEN 325 MG / HYDROcodone BITARTRATE 5 MG ORAL TABLET (Tablet) 60 0 15 5 0 MG/325 0 MG NA SALONI BROADT [facility] [place] 0 / 0 [place]    1 [id number] 02/28/2023 02/28/2023 Zolpidem Tartrate (Tablet, Extended Release) 30 0 30 6 25 MG NA SALONI BROADT [facility] [place] 0 / 0 [place]    1 [id number] 01/30/2023 01/30/2023 Zolpidem Tartrate (Tablet, Extended Release) 30 0 30 6 25 MG NA SALONI BROADT [facility] [place] 0 / 0 [place]    1 [id number] 12/30/2022 12/27/2022 Zolpidem Tartrate (Tablet, Extended Release) 30 0 30 6 25 MG NA [address] [facility] [place] 0 / 0 [place]    1 [id number] 12/28/2022 12/27/2022 ALPRAZolam (Tablet) 180 0 90 0 25 MG NA [place] DIOGO [facility] [place] 0 / 0 [place]    1 [id number] 12/27/2022 12/27/2022 ACETAMINOPHEN 325 MG / HYDROcodone BITARTRATE 5 MG ORAL TABLET (Tablet) 60 0 15 5 0 MG/325 0 MG NA Ryne [address]

## 2023-06-08 DIAGNOSIS — G47.00 INSOMNIA, UNSPECIFIED TYPE: ICD-10-CM

## 2023-06-08 RX ORDER — ZOLPIDEM TARTRATE 6.25 MG/1
6.25 TABLET, FILM COATED, EXTENDED RELEASE ORAL
Qty: 30 TABLET | Refills: 0 | Status: SHIPPED | OUTPATIENT
Start: 2023-06-08

## 2023-06-08 NOTE — TELEPHONE ENCOUNTER
1 [id number] 06/06/2023 06/06/2023 ACETAMINOPHEN 325 MG / HYDROcodone BITARTRATE 5 MG ORAL TABLET (Tablet) 60 0 15 5 0 MG/325 0 MG NA [address] [facility] [place] 0 / 0 [place]    1 [id number] 05/01/2023 05/01/2023 ALPRAZolam (Tablet) 180 0 90 0 25 MG NA SALONI BROADT [facility] [place] 0 / 0 [place]    1 [id number] 05/01/2023 05/01/2023 Zolpidem Tartrate (Tablet, Extended Release) 30 0 30 6 25 MG NA SALONI BROADT [facility] [employer]  0 / 0 [place]    1 [id number] 04/12/2023 04/12/2023 ACETAMINOPHEN 325 MG / HYDROcodone BITARTRATE 5 MG ORAL TABLET (Tablet) 60 0 15 5 0 MG/325 0 MG NA SALONI BROADT [facility] Medicare 0 / 0 [place]    1 [id number] 03/30/2023 03/29/2023 Zolpidem Tartrate (Tablet, Extended Release) 30 0 30 6 25 MG NA SALONI BROADT [facility] [place] 0 / 0 [place]    1 [id number] 02/28/2023 02/28/2023 ACETAMINOPHEN 325 MG / HYDROcodone BITARTRATE 5 MG ORAL TABLET (Tablet) 60 0 15 5 0 MG/325 0 MG NA SALONI BROADT [facility] Medicare 0 / 0 [place]    1 [id number] 02/28/2023 02/28/2023 Zolpidem Tartrate (Tablet, Extended Release) 30 0 30 6 25 MG NA SALONI BROADT [facility] [place] 0 / 0 [place]    1 [id number] 01/30/2023 01/30/2023 Zolpidem Tartrate (Tablet, Extended Release) 30 0 30 6 25 MG NA SALONI BROADT [facility] Medicare 0 / 0 [place]    1 [id number] 12/30/2022 12/27/2022 Zolpidem Tartrate (Tablet, Extended Release) 30 0 30 6 25 MG NA [place] [id number] [facility] Medicare 0 / 0 [place]    1 [id number] 12/28/2022 12/27/2022 ALPRAZolam (Tablet) 180 0 90 0 25 MG NA [place] [facility]

## 2023-06-14 ENCOUNTER — RA CDI HCC (OUTPATIENT)
Dept: OTHER | Facility: HOSPITAL | Age: 83
End: 2023-06-14

## 2023-06-14 NOTE — PROGRESS NOTES
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

## 2023-06-16 ENCOUNTER — APPOINTMENT (OUTPATIENT)
Dept: LAB | Facility: HOSPITAL | Age: 83
End: 2023-06-16
Attending: FAMILY MEDICINE
Payer: COMMERCIAL

## 2023-06-16 DIAGNOSIS — E13.22 OTHER SPECIFIED DIABETES MELLITUS WITH CHRONIC KIDNEY DISEASE, WITH LONG-TERM CURRENT USE OF INSULIN, UNSPECIFIED CKD STAGE (HCC): ICD-10-CM

## 2023-06-16 DIAGNOSIS — Z79.4 OTHER SPECIFIED DIABETES MELLITUS WITH CHRONIC KIDNEY DISEASE, WITH LONG-TERM CURRENT USE OF INSULIN, UNSPECIFIED CKD STAGE (HCC): ICD-10-CM

## 2023-06-16 DIAGNOSIS — E03.9 HYPOTHYROIDISM, UNSPECIFIED TYPE: ICD-10-CM

## 2023-06-16 DIAGNOSIS — E11.42 TYPE 2 DIABETES MELLITUS WITH DIABETIC POLYNEUROPATHY, WITHOUT LONG-TERM CURRENT USE OF INSULIN (HCC): ICD-10-CM

## 2023-06-16 DIAGNOSIS — E78.00 HYPERCHOLESTEROLEMIA: ICD-10-CM

## 2023-06-16 LAB
ALBUMIN SERPL BCP-MCNC: 4.2 G/DL (ref 3.5–5)
ALP SERPL-CCNC: 52 U/L (ref 34–104)
ALT SERPL W P-5'-P-CCNC: 11 U/L (ref 7–52)
ANION GAP SERPL CALCULATED.3IONS-SCNC: 7 MMOL/L (ref 4–13)
AST SERPL W P-5'-P-CCNC: 16 U/L (ref 13–39)
BILIRUB SERPL-MCNC: 0.38 MG/DL (ref 0.2–1)
BUN SERPL-MCNC: 20 MG/DL (ref 5–25)
CALCIUM SERPL-MCNC: 9.5 MG/DL (ref 8.4–10.2)
CHLORIDE SERPL-SCNC: 100 MMOL/L (ref 96–108)
CHOLEST SERPL-MCNC: 159 MG/DL
CO2 SERPL-SCNC: 31 MMOL/L (ref 21–32)
CREAT SERPL-MCNC: 1.11 MG/DL (ref 0.6–1.3)
GFR SERPL CREATININE-BSD FRML MDRD: 46 ML/MIN/1.73SQ M
GLUCOSE P FAST SERPL-MCNC: 95 MG/DL (ref 65–99)
HDLC SERPL-MCNC: 64 MG/DL
LDLC SERPL CALC-MCNC: 69 MG/DL (ref 0–100)
POTASSIUM SERPL-SCNC: 4 MMOL/L (ref 3.5–5.3)
PROT SERPL-MCNC: 7.1 G/DL (ref 6.4–8.4)
SODIUM SERPL-SCNC: 138 MMOL/L (ref 135–147)
TRIGL SERPL-MCNC: 132 MG/DL
TSH SERPL DL<=0.05 MIU/L-ACNC: 4.28 UIU/ML (ref 0.45–4.5)

## 2023-06-16 PROCEDURE — 80061 LIPID PANEL: CPT

## 2023-06-16 PROCEDURE — 83036 HEMOGLOBIN GLYCOSYLATED A1C: CPT

## 2023-06-16 PROCEDURE — 36415 COLL VENOUS BLD VENIPUNCTURE: CPT

## 2023-06-16 PROCEDURE — 80053 COMPREHEN METABOLIC PANEL: CPT

## 2023-06-16 PROCEDURE — 84443 ASSAY THYROID STIM HORMONE: CPT

## 2023-06-17 LAB
EST. AVERAGE GLUCOSE BLD GHB EST-MCNC: 143 MG/DL
HBA1C MFR BLD: 6.6 %

## 2023-06-19 DIAGNOSIS — Z72.0 TOBACCO ABUSE: ICD-10-CM

## 2023-06-19 RX ORDER — VARENICLINE TARTRATE 25 MG
KIT ORAL
Qty: 53 EACH | Refills: 0 | Status: CANCELLED | OUTPATIENT
Start: 2023-06-19

## 2023-06-19 RX ORDER — VARENICLINE TARTRATE 1 MG/1
1 TABLET, FILM COATED ORAL 2 TIMES DAILY
Qty: 60 TABLET | Refills: 3 | Status: SHIPPED | OUTPATIENT
Start: 2023-06-19

## 2023-06-23 ENCOUNTER — OFFICE VISIT (OUTPATIENT)
Dept: FAMILY MEDICINE CLINIC | Facility: CLINIC | Age: 83
End: 2023-06-23
Payer: COMMERCIAL

## 2023-06-23 VITALS
OXYGEN SATURATION: 98 % | BODY MASS INDEX: 31.24 KG/M2 | HEART RATE: 87 BPM | SYSTOLIC BLOOD PRESSURE: 140 MMHG | TEMPERATURE: 97.6 F | HEIGHT: 64 IN | WEIGHT: 183 LBS | DIASTOLIC BLOOD PRESSURE: 90 MMHG

## 2023-06-23 DIAGNOSIS — Z01.818 PREOPERATIVE EXAMINATION: Primary | ICD-10-CM

## 2023-06-23 DIAGNOSIS — J44.9 CHRONIC OBSTRUCTIVE PULMONARY DISEASE, UNSPECIFIED COPD TYPE (HCC): ICD-10-CM

## 2023-06-23 PROCEDURE — 99214 OFFICE O/P EST MOD 30 MIN: CPT | Performed by: NURSE PRACTITIONER

## 2023-06-23 PROCEDURE — 93000 ELECTROCARDIOGRAM COMPLETE: CPT | Performed by: NURSE PRACTITIONER

## 2023-06-23 NOTE — PROGRESS NOTES
PRE-OPERATIVE EVALUATION  [facility] - [facility] ARDHA    NAME: Shadi Pacheco  AGE: 80 y o  SEX: female  : 1940     DATE: 2023    Pre-Operative Evaluation:     Chief Complaint: Pre-operative Evaluation     Surgery: Left cataract extration  Anticipated Date of Surgery: 2023  Referring Provider: Dr Kristin Clarke MD      History of Present Illness:     David Webster is a 80 y o  female who presents to the office today for a preoperative consultation at the request of surgeon, Dr Kristin Clarke MD, who plans on performing left cataract extration on 2023  Planned anesthesia is local and IV sedation  Patient has a bleeding risk of: no recent abnormal bleeding, no remote history of abnormal bleeding and no use of Ca-channel blockers  Patient does not have objections to receiving blood products if needed  Current anti-platelet/anti-coagulation medications that the patient is prescribed includes: Clopidogrel (Plavix)   Patient continuing use without need to stop     Assessment of Chronic Conditions:   - Diabetes Mellitus: Well controlled on prescribed medication   - Congestive Heart Failure: Stable - on current medications  - COPD: Stable - on current medication  - Hypertension: Stable - on current medication     Assessment of Cardiac Risk:  Denies unstable or severe angina or MI in the last 6 weeks or history of stent placement in the last year   Denies decompensated heart failure (e g  New onset heart failure, NYHA functional class IV heart failure, or worsening existing heart failure)  Denies significant arrhythmias such as high grade AV block, symptomatic ventricular arrhythmia, newly recognized ventricular tachycardia, supraventricular tachycardia with resting heart rate >100, or symptomatic bradycardia  Denies severe heart valve disease including aortic stenosis or symptomatic mitral stenosis     Exercise Capacity:  Able to walk 4 blocks without symptoms?: No - due to COPD and severe osteoarthritis  Able to walk 2 flights without symptoms?: No - due to  COPD and severe osteoarthritis    Prior Anesthesia Reactions: No     Personal history of venous thromboembolic disease? No    History of steroid use for >2 weeks within last year? No    STOP-BANG Sleep Apnea Screening Questionnaire:      Do you SNORE loudly (louder than talking or loud enough to be heard through closed doors)? No = 0 point   Do you often feel TIRED, fatigued, or sleepy during daytime? No = 0 point   Has anyone OBSERVED you stop breathing during your sleep? No = 0 point   Do you have or are you being treated for high blood pressure? Yes = 1 point   BMI more than 35 kg/m2? No = 0 point   AGE over 48years old? Yes = 1 point   NECK circumference > 16 inches (40 cm)? No = 0 point   Male GENDER? No = 0 point   TOTAL SCORE 2 = LOW risk of DIANA       Review of Systems:     Review of Systems   Constitutional: Negative for activity change, appetite change and unexpected weight change  HENT: Negative for dental problem, ear pain, hearing loss, nosebleeds, sneezing, sore throat, tinnitus and trouble swallowing  Eyes: Positive for visual disturbance  Respiratory: Negative for cough, chest tightness, shortness of breath and wheezing  Cardiovascular: Negative for chest pain, palpitations and leg swelling  Gastrointestinal: Negative for abdominal distention, abdominal pain, constipation, diarrhea and nausea  Endocrine: Negative for polydipsia, polyphagia and polyuria  Genitourinary: Negative  Musculoskeletal: Positive for arthralgias and gait problem (uses a cane)  Negative for back pain, myalgias and neck pain  Skin: Negative for color change and rash  Allergic/Immunologic: Negative for environmental allergies  Neurological: Negative for dizziness, weakness, light-headedness and headaches  Hematological: Negative  Psychiatric/Behavioral: Negative    Negative for dysphoric mood and sleep disturbance  The patient is not nervous/anxious  Problem List:     Patient Active Problem List   Diagnosis   • 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 ([place] [address] )   • Anxiety   • Bilateral pneumonia   • Centrilobular emphysema ([place] [address] )   • CHF (congestive heart failure) ([place] [address] )   • Chronic airway obstruction (HCC)   • Coronary atherosclerosis   • GERD without esophagitis   • 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, or chronic kidney disease ([place] [address] )   • Hypercholesterolemia   • Hypothyroidism   • Lumbar radiculopathy   • Obesity (BMI 30 0-34  9)   • Osteopenia   • PAD (peripheral artery disease) ([facility])   • Tobacco abuse   • Unspecified atrial fibrillation ([facility])   • Stage 3a chronic kidney disease ([facility])   • Secondary hyperparathyroidism of renal origin ([place] [address] )   • Vitamin D deficiency   • Other specified diabetes mellitus with diabetic peripheral angiopathy without gangrene ([place] [address] )   • Hypertension   • Continuous opioid dependence ([place] [address] )   • Acute on chronic diastolic (congestive) heart failure ([facility])        Allergies:     No Known Allergies     Current Medications:       Current Outpatient Medications:   •  acetaminophen (TYLENOL) 500 mg tablet, Take 500 mg by mouth every 6 (six) hours, Disp: , Rfl:   •  albuterol (2 5 mg/3 mL) 0 083 % nebulizer solution, Take 3 mL (2 5 mg total) by nebulization every 6 (six) hours as needed for wheezing or shortness of breath, Disp: 300 mL, Rfl: 3  •  albuterol (PROVENTIL HFA,VENTOLIN HFA) 90 mcg/act inhaler, Inhale 2 puffs every 6 (six) hours as needed for wheezing or shortness of breath, Disp: 8 g, Rfl: 3  •  ALPRAZolam (XANAX) 0 25 mg tablet, Take 1 tablet (0 25 mg total) by mouth 2 (two) times a day as needed for anxiety, Disp: 180 tablet, Rfl: 0  •  aspirin (ECOTRIN LOW STRENGTH) 81 mg EC tablet, Take by mouth, Disp: , Rfl:   •  calcitriol (ROCALTROL) 0 25 mcg capsule, Take 1 capsule (0 25 mcg total) by mouth daily, Disp: 90 capsule, Rfl: 3  •  candesartan (ATACAND) 16 mg tablet, Take 1 tablet (16 mg total) by mouth daily at bedtime, Disp: 90 tablet, Rfl: 3  •  carvedilol (COREG) 12 5 mg tablet, Take 1 tablet (12 5 mg total) by mouth 2 (two) times a day with meals, Disp: 180 tablet, Rfl: 3  •  cholecalciferol (VITAMIN D3) 1,000 units tablet, Take 1,000 Units by mouth daily, Disp: , Rfl:   •  clopidogrel (PLAVIX) 75 mg tablet, Take 75 mg by mouth daily , Disp: , Rfl: 0  •  Diclofenac Sodium (VOLTAREN) 1 %, Apply 2 g topically 4 (four) times a day, Disp: 100 g, Rfl: 3  •  escitalopram (LEXAPRO) 10 mg tablet, take 1 tablet by mouth once daily, Disp: 30 tablet, Rfl: 5  •  fluticasone (FLONASE) 50 mcg/act nasal spray, 1 spray into each nostril daily, Disp: 16 g, Rfl: 0  •  HYDROcodone-acetaminophen (NORCO) 5-325 mg per tablet, Take 1 tablet by mouth every 6 (six) hours as needed for pain Max Daily Amount: 4 tablets, Disp: 60 tablet, Rfl: 0  •  levothyroxine 100 mcg tablet, Take 1 tablet (100 mcg total) by mouth daily in the early morning, Disp: 90 tablet, Rfl: 1  •  niacin (NIASPAN) 1000 MG CR tablet, Take 1,000 mg by mouth, Disp: , Rfl:   •  pantoprazole (PROTONIX) 40 mg tablet, Take 1 tablet (40 mg total) by mouth 2 (two) times a day, Disp: 180 tablet, Rfl: 3  •  rosuvastatin (CRESTOR) 40 MG tablet, Take 40 mg by mouth daily, Disp: , Rfl:   •  senna (SENOKOT) 8 6 MG tablet, Take 1 tablet by mouth daily, Disp: , Rfl:   •  tiotropium (Spiriva HandiHaler) 18 mcg inhalation capsule, Place 1 capsule (18 mcg total) into inhaler and inhale in the morning , Disp: 30 capsule, Rfl: 3  •  varenicline (CHANTIX) 1 mg tablet, Take 1 tablet (1 mg total) by mouth 2 (two) times a day, Disp: 60 tablet, Rfl: 3  •  zolpidem (AMBIEN CR) 6 25 MG CR tablet, Take 1 tablet (6 25 mg total) by mouth daily at bedtime as needed for sleep, Disp: 30 tablet, Rfl: 0  •  torsemide (DEMADEX) 20 mg tablet, Take 1 tablet (20 mg total) "by mouth daily, Disp: 90 tablet, Rfl: 3     Past History:     Past Medical History:   Diagnosis Date   • Coronary artery disease    • Hyperlipidemia    • Hypertension    • Peripheral vascular disease (HCC)         Past Surgical History:   Procedure Laterality Date   • CARDIAC SURGERY     • HYSTERECTOMY      age 32   • OOPHORECTOMY Right     age 32   • SMALL INTESTINE SURGERY      6 inches   • TONSILLECTOMY     • US GUIDED BREAST BIOPSY RIGHT COMPLETE Right 2019        Family History   Problem Relation Age of Onset   • Hypertension Father         essential    • Heart disease Father    • Kidney disease Mother    • Diabetes Mother         Social History     Tobacco Use   • Smoking status: Every Day     Packs/day: 0 25     Types: Cigarettes     Last attempt to quit: 3/1/2019     Years since quittin 3     Passive exposure: Current   • Smokeless tobacco: Never   Substance Use Topics   • Alcohol use: No   • Drug use: Never        Physical Exam:      /90 (BP Location: Left arm, Patient Position: Sitting, Cuff Size: Adult)   Pulse 87   Temp 97 6 °F (36 4 °C)   Ht 5' 4\" (1 626 m)   Wt 83 kg (183 lb)   SpO2 98%   BMI 31 41 kg/m² (Reviewed)    Physical Exam  Vitals reviewed  Constitutional:       General: She is not in acute distress  Appearance: She is well-developed and well-groomed  She is not ill-appearing  HENT:      Head: Normocephalic and atraumatic  Right Ear: External ear normal       Left Ear: External ear normal       Nose: Nose normal       Mouth/Throat:      Mouth: Mucous membranes are moist    Eyes:      General: Lids are normal       Extraocular Movements: Extraocular movements intact  Conjunctiva/sclera: Conjunctivae normal       Pupils: Pupils are equal, round, and reactive to light  Neck:      Trachea: Trachea and phonation normal    Cardiovascular:      Rate and Rhythm: Normal rate and regular rhythm  Heart sounds: Normal heart sounds     Pulmonary:      Effort: " Pulmonary effort is normal       Breath sounds: Normal breath sounds  Abdominal:      General: Abdomen is flat  Bowel sounds are normal       Palpations: Abdomen is soft  Musculoskeletal:      Cervical back: Neck supple  Skin:     General: Skin is warm and dry  Capillary Refill: Capillary refill takes less than 2 seconds  Neurological:      General: No focal deficit present  Mental Status: She is alert and oriented to person, place, and time  Psychiatric:         Mood and Affect: Mood normal          Behavior: Behavior normal  Behavior is cooperative  Data:     Pre-operative work-up    Laboratory Results: I have personally reviewed the pertinent laboratory results/reports     EKG: I have personally reviewed pertinent reports  Assessment:     1  Preoperative examination  POCT ECG      2  Chronic obstructive pulmonary disease, unspecified COPD type ([facility] [address] )             Plan:     80 y o  female with planned surgery: Left cataract extraction    Cardiac Risk Estimation: per the Revised Cardiac Risk Index (Circ  100:1043, 1999), the patient's risk factors for cardiac complications include history of congestive heart failure, putting her in: RCI RISK CLASS II (1 risk factor, risk of major cardiac compl  appr  1 3%)  RCRI  High Risk surgery? 1 Point  CAD History:         1 Point   MI; Positive Stress Test; CP due to Mi;  Nitrate Usage to control Angina; Pathologic Q wave on EKG  CHF Active:         1 Point   Pulm Edema; Paroxysmal Nocturnal Dyspnea;  Bibasilar Rales (crackles);S3; CHF on CXR  Cerebrovascular Disease (TIA or CVA):     1 Point  DM on Insulin:        1 Point  Serum Creat >2 0 mg/dl:       1 Point          Total Points: 1     Scorin: Class I, Very Low Risk (0 4%)     1: Class II, Low risk (0 9%)     2: Class III Moderate (6 6%)     3: Class IV High (>11%)    1  Further preoperative workup as follows:   - None; no further preoperative work-up is required    2  Medication Management/Recommendations:   - None, continue medication regimen including morning of surgery, with sip of water    3  Prophylaxis for cardiac events with perioperative beta-blockers: not indicated  4  Patient requires further consultation with: None    TIFFANIE Risk:  GFR:   eGFR   Date Value Ref Range Status   06/16/2023 46 ml/min/1 73sq m Final       - If GFR>60, Hold ACE/ARB/Diuretic on the day of surgery, and NSAIDS 10 days before  - If GFR<45, Consider PRE and POST op Nephrology Consult  - If 46 <GFR> 59 : Has Patient had TIFFANIE in last 6 Months? no   If YES: Preop Nephrology consult   If No:  [address] Nephrology consult  Clearance  Patient is CLEARED for surgery without any additional cardiac testing  A copy of this evaluation was transmitted electronically or by fax to the requesting provider       Uzair Reagan, [address] [place] [address] [id number] [place] [id number]  Phone#  [phone]  Fax#  [phone]

## 2023-07-12 DIAGNOSIS — M54.16 LUMBAR RADICULOPATHY: ICD-10-CM

## 2023-07-12 DIAGNOSIS — G47.00 INSOMNIA, UNSPECIFIED TYPE: ICD-10-CM

## 2023-07-12 RX ORDER — HYDROCODONE BITARTRATE AND ACETAMINOPHEN 5; 325 MG/1; MG/1
1 TABLET ORAL EVERY 6 HOURS PRN
Qty: 60 TABLET | Refills: 0 | Status: SHIPPED | OUTPATIENT
Start: 2023-07-12

## 2023-07-12 RX ORDER — ZOLPIDEM TARTRATE 6.25 MG/1
6.25 TABLET, FILM COATED, EXTENDED RELEASE ORAL
Qty: 30 TABLET | Refills: 0 | Status: SHIPPED | OUTPATIENT
Start: 2023-07-12

## 2023-07-12 NOTE — TELEPHONE ENCOUNTER
1 [id number] 06/08/2023 06/08/2023 Zolpidem Tartrate (Tablet, Extended Release) 30.0 30 6.25 MG NA HILTON LIND [facility] Commercial Insurance 0 / 0 [place]    1 [id number] 06/06/2023 06/06/2023 ACETAMINOPHEN 325 MG / HYDROcodone BITARTRATE 5 MG ORAL TABLET (Tablet) 60.0 15 5.0 MG/325.0 MG 20.0 [address] [facility] [place] [place] 0 / 0 [place]    1 [id number] 05/01/2023 05/01/2023 ALPRAZolam (Tablet) 180.0 90 0.25 MG NA SALONI BROADT [facility] [place] [place]    1 [id number] 05/01/2023 05/01/2023 Zolpidem Tartrate (Tablet, Extended Release) 30.0 30 6.25 MG NA SALONI BROADT [facility] 0 / 0 [place]    1 [id number] 04/12/2023 04/12/2023 ACETAMINOPHEN 325 MG / HYDROcodone BITARTRATE 5 MG ORAL TABLET (Tablet) 60.0 15 5.0 MG/325.0 MG 20.0 SALONI BROADT [facility] [place] [place] / 0 [place]    1 [id number] 03/30/2023 03/29/2023 Zolpidem Tartrate (Tablet, Extended Release) 30.0 30 6.25 MG NA SALONI BROADT [facility] [place] [place]    1 [id number] 02/28/2023 02/28/2023 ACETAMINOPHEN 325 MG / HYDROcodone BITARTRATE 5 MG ORAL TABLET (Tablet) 60.0 15 5.0 MG/325.0 MG 20.0 SALONI BROADT [facility] [place] [place]    1 [id number] 02/28/2023 02/28/2023 Zolpidem Tartrate (Tablet, Extended Release) 30.0 30 6.25 MG NA SALONI BROADT [facility] [place] [place]    1 [id number] 01/30/2023 01/30/2023 Zolpidem Tartrate (Tablet, Extended Release) 30.0 30 6.25 MG NA SALONI BROADT [facility] [place] 0 / 0 [place]    1 [id number] 12/30/2022 12/27/2022 Zolpidem Tartrate (Tablet, Extended Release) 30.0 30 6.25 MG NA [address] Medicare

## 2023-07-21 ENCOUNTER — RA CDI HCC (OUTPATIENT)
Dept: OTHER | Facility: HOSPITAL | Age: 83
End: 2023-07-21

## 2023-07-21 NOTE — PROGRESS NOTES
[address] coding opportunities       Chart Reviewed number of suggestions sent to Provider: 2     Patients Insurance     Medicare Insurance: [employer] Advantage          R71.97:  Other specified diabetes mellitus with diabetic peripheral angiopathy without gangrene [facility]) [[id number]]     Refer to 8/25/2021 when last assessed - please review and assess for 0655 if applicable         N52.6: Hypertensive heart and kidney disease with HF and with CKD stage I-IV ([address]) [043037]     The classification presumes a causal relationship between hypertension and heart involvement and between hypertension and kidney involvement unless documented as unrelated

## 2023-08-11 DIAGNOSIS — G47.00 INSOMNIA, UNSPECIFIED TYPE: ICD-10-CM

## 2023-08-11 RX ORDER — ZOLPIDEM TARTRATE 6.25 MG/1
6.25 TABLET, FILM COATED, EXTENDED RELEASE ORAL
Qty: 30 TABLET | Refills: 0 | Status: SHIPPED | OUTPATIENT
Start: 2023-08-11 | End: 2023-09-11 | Stop reason: SDUPTHER

## 2023-08-11 NOTE — TELEPHONE ENCOUNTER
1 [id number] 07/13/2023 07/12/2023 ACETAMINOPHEN 325 MG / HYDROcodone BITARTRATE 5 MG ORAL TABLET (Tablet) 60.0 15 5.0 MG/325.0 MG 20.0 SALONI BROADT [facility] [place] [place]    1 [id number] 07/12/2023 07/12/2023 Zolpidem Tartrate (Tablet, Extended Release) 30.0 30 6.25 MG NA SALONI BROADT [facility] 0 / 0 [place]    1 [id number] 06/08/2023 06/08/2023 Zolpidem Tartrate (Tablet, Extended Release) 30.0 30 6.25 MG NA HILTON LIND [facility] Commercial Insurance 0 / 0 [place]    1 [id number] 06/06/2023 06/06/2023 ACETAMINOPHEN 325 MG / HYDROcodone BITARTRATE 5 MG ORAL TABLET (Tablet) 60.0 15 5.0 MG/325.0 MG 20.0 [address] [place] [address] [facility] [place] [place]    1 [id number] 05/01/2023 05/01/2023 ALPRAZolam (Tablet) 180.0 90 0.25 MG NA SALONI BROADT [facility] [place] [place]    1 [id number] 05/01/2023 05/01/2023 Zolpidem Tartrate (Tablet, Extended Release) 30.0 30 6.25 MG NA SALONI BROADT [facility] 0 / 0 [place]    1 [id number] 04/12/2023 04/12/2023 ACETAMINOPHEN 325 MG / HYDROcodone BITARTRATE 5 MG ORAL TABLET (Tablet) 60.0 15 5.0 MG/325.0 MG 20.0 SALONI BROADT [facility] [place] [place]    1 [id number] 03/30/2023 03/29/2023 Zolpidem Tartrate (Tablet, Extended Release) 30.0 30 6.25 MG NA SALONI BROADT [facility] [place] [place]    1 [id number] 02/28/2023 02/28/2023 ACETAMINOPHEN 325 MG / HYDROcodone BITARTRATE 5 MG ORAL TABLET (Tablet) 60.0 15 5.0 MG/325.0 MG 20.0 [facility] [place] [place]    1 [id number] 02/28/2023 02/28/2023 Zolpidem Tartrate (Tablet, Extended Release) 30.0 30 6.25 MG NA [address] Medicare

## 2023-09-11 DIAGNOSIS — M54.16 LUMBAR RADICULOPATHY: ICD-10-CM

## 2023-09-11 DIAGNOSIS — G47.00 INSOMNIA, UNSPECIFIED TYPE: ICD-10-CM

## 2023-09-11 DIAGNOSIS — J44.9 CHRONIC OBSTRUCTIVE PULMONARY DISEASE, UNSPECIFIED COPD TYPE (HCC): ICD-10-CM

## 2023-09-11 RX ORDER — ALBUTEROL SULFATE 90 UG/1
2 AEROSOL, METERED RESPIRATORY (INHALATION) EVERY 6 HOURS PRN
Qty: 8 G | Refills: 3 | Status: SHIPPED | OUTPATIENT
Start: 2023-09-11

## 2023-09-11 RX ORDER — ZOLPIDEM TARTRATE 6.25 MG/1
6.25 TABLET, FILM COATED, EXTENDED RELEASE ORAL
Qty: 30 TABLET | Refills: 0 | Status: SHIPPED | OUTPATIENT
Start: 2023-09-11

## 2023-09-11 RX ORDER — HYDROCODONE BITARTRATE AND ACETAMINOPHEN 5; 325 MG/1; MG/1
1 TABLET ORAL EVERY 6 HOURS PRN
Qty: 60 TABLET | Refills: 0 | Status: SHIPPED | OUTPATIENT
Start: 2023-09-11

## 2023-09-11 NOTE — TELEPHONE ENCOUNTER
[id number] 08/13/2023 08/11/2023 Zolpidem Tartrate (Tablet, Extended Release) 30.0 30 6.25 MG NA HILTON DIOGO [facility] Commercial Insurance 0 / 0 [place]     1 [id number] 07/13/2023 07/12/2023 ACETAMINOPHEN 325 MG / HYDROcodone BITARTRATE 5 MG ORAL TABLET (Tablet) 60.0 15 5.0 MG/325.0 MG 20.0 SALONI COPE [facility] [place] [place]    1 [id number] 07/12/2023 07/12/2023 Zolpidem Tartrate (Tablet, Extended Release) 30.0 30 6.25 MG NA SALONI COPE [facility] ''  0 / 0 [place]    1 [id number] 06/08/2023 06/08/2023 Zolpidem Tartrate (Tablet, Extended Release) 30.0 30 6.25 MG NA [facility] [employer] [facility] Commercial Insurance 0 / 0 [place]    1 [id number] 06/06/2023 06/06/2023 ACETAMINOPHEN 325 MG / HYDROcodone BITARTRATE 5 MG ORAL TABLET (Tablet) 60.0 15 5.0 MG/325.0 MG 20.0 [facility]

## 2023-09-25 DIAGNOSIS — K21.9 GASTROESOPHAGEAL REFLUX DISEASE WITHOUT ESOPHAGITIS: ICD-10-CM

## 2023-09-25 DIAGNOSIS — E03.9 HYPOTHYROIDISM, UNSPECIFIED TYPE: ICD-10-CM

## 2023-09-25 DIAGNOSIS — F32.9 REACTIVE DEPRESSION: ICD-10-CM

## 2023-09-25 NOTE — TELEPHONE ENCOUNTER
Reason for call:   [x] Refill   [] Prior Auth  [] Other:     Office:   [x] PCP/Provider - Ej  [] Speciality/Provider -     Medication: Escitalopram     Dose/Frequency: 10mg    Quantity: 30    Medication: Levothyroxine     Dose/Frequency: 100mcg    Quantity: 90    Medication: Pantoprazole    Dose/Frequency: 40mg    Quantity: 180      Pharmacy: [facility]    Does the patient have enough for 3 days?    [x] Yes   [] No - Send as HP to POD

## 2023-09-26 RX ORDER — PANTOPRAZOLE SODIUM 40 MG/1
40 TABLET, DELAYED RELEASE ORAL 2 TIMES DAILY
Qty: 180 TABLET | Refills: 1 | Status: SHIPPED | OUTPATIENT
Start: 2023-09-26

## 2023-09-26 RX ORDER — LEVOTHYROXINE SODIUM 0.1 MG/1
100 TABLET ORAL
Qty: 90 TABLET | Refills: 1 | Status: SHIPPED | OUTPATIENT
Start: 2023-09-26

## 2023-09-26 RX ORDER — ESCITALOPRAM OXALATE 10 MG/1
10 TABLET ORAL DAILY
Qty: 30 TABLET | Refills: 5 | Status: SHIPPED | OUTPATIENT
Start: 2023-09-26

## 2023-10-10 DIAGNOSIS — G47.00 INSOMNIA, UNSPECIFIED TYPE: ICD-10-CM

## 2023-10-10 RX ORDER — ZOLPIDEM TARTRATE 6.25 MG/1
6.25 TABLET, FILM COATED, EXTENDED RELEASE ORAL
Qty: 30 TABLET | Refills: 0 | Status: SHIPPED | OUTPATIENT
Start: 2023-10-10

## 2023-10-10 NOTE — TELEPHONE ENCOUNTER
Reason for call:   [x] Refill   [] Prior Auth  [] Other:     Office:   [x] PCP/Provider - Connor Solano  [] Speciality/Provider -     Medication: Ambien    Dose/Frequency: 6.25 mg    Quantity: 30    Pharmacy:   [address] [place] [address]    Does the patient have enough for 3 days?    [x] Yes   [] No - Send as HP to POD

## 2023-11-10 DIAGNOSIS — G47.00 INSOMNIA, UNSPECIFIED TYPE: ICD-10-CM

## 2023-11-10 RX ORDER — ZOLPIDEM TARTRATE 6.25 MG/1
6.25 TABLET, FILM COATED, EXTENDED RELEASE ORAL
Qty: 30 TABLET | Refills: 0 | Status: SHIPPED | OUTPATIENT
Start: 2023-11-10

## 2023-11-10 NOTE — TELEPHONE ENCOUNTER
Reason for call:   [x] Refill   [] Prior Auth  [] Other:     Office:   [x] PCP/Provider - [address] [place] [address] / Darryl Canseco  [] Specialty/Provider -     Medication: zolpidem CR    Dose/Frequency: 6.25mg qd hs prn    Quantity: 30    Pharmacy: [address] [place] [address] #[id number] Dequan Zhu, [address]     Does the patient have enough for 3 days?    [] Yes   [x] No - Send as HP to POD

## 2023-11-10 NOTE — TELEPHONE ENCOUNTER
1 [id number] 10/11/2023 10/10/2023 Zolpidem Tartrate (Tablet, Extended Release) 30.0 30 6.25 MG NA SALONI BROADT [facility] 0 / 0 [place]    1 [id number] 09/11/2023 09/11/2023 ACETAMINOPHEN 325 MG / HYDROcodone BITARTRATE 5 MG ORAL TABLET (Tablet) 60.0 15 5.0 MG/325.0 MG 20.0 SALONI BROADT [facility] [place] [place]    1 [id number] 09/11/2023 09/11/2023 Zolpidem Tartrate (Tablet, Extended Release) 30.0 30 6.25 MG NA SALONI BROADT [facility] 0 / 0 [place]    1 [id number] 08/13/2023 08/11/2023 Zolpidem Tartrate (Tablet, Extended Release) 30.0 30 6.25 MG NA HILTON DIOGO [facility] 0 / 0 [place]    1 [id number] 07/13/2023 07/12/2023 ACETAMINOPHEN 325 MG / HYDROcodone BITARTRATE 5 MG ORAL TABLET (Tablet) 60.0 15 5.0 MG/325.0 MG 20.0 SALONI BROADT [facility] [place] [place]    1 [id number] 07/12/2023 07/12/2023 Zolpidem Tartrate (Tablet, Extended Release) 30.0 30 6.25 MG NA SALONI BROADT [facility] 0 / 0 [place]    1 [id number] 06/08/2023 06/08/2023 Zolpidem Tartrate (Tablet, Extended Release) 30.0 30 6.25 MG NA [employer] [facility] Commercial Insurance 0 / 0 [place]    1 [id number] 06/06/2023 06/06/2023 ACETAMINOPHEN 325 MG / HYDROcodone BITARTRATE 5 MG ORAL TABLET (Tablet) 60.0 15 5.0 MG/325.0 MG 20.0 [address] [facility] [place] [place]    1 [id number] 05/01/2023 05/01/2023 ALPRAZolam (Tablet) 180.0 90 0.25 MG NA SALONI BROADT [facility] [place] [place] 0 / 0 [place]    1 [id number] 05/01/2023 05/01/2023 Zolpidem Tartrate (Tablet, Extended Release) 30.0 30 6.25 MG NA SALONI BROADT [facility] [place] Commercial [employer]

## 2023-12-04 ENCOUNTER — TELEPHONE (OUTPATIENT)
Age: 83
End: 2023-12-04

## 2023-12-04 DIAGNOSIS — I10 ESSENTIAL HYPERTENSION: ICD-10-CM

## 2023-12-04 DIAGNOSIS — J44.9 CHRONIC OBSTRUCTIVE PULMONARY DISEASE, UNSPECIFIED COPD TYPE (HCC): ICD-10-CM

## 2023-12-04 DIAGNOSIS — E11.42 TYPE 2 DIABETES MELLITUS WITH DIABETIC POLYNEUROPATHY, WITHOUT LONG-TERM CURRENT USE OF INSULIN (HCC): ICD-10-CM

## 2023-12-04 DIAGNOSIS — F41.9 ANXIETY: ICD-10-CM

## 2023-12-04 DIAGNOSIS — E78.00 HYPERCHOLESTEROLEMIA: ICD-10-CM

## 2023-12-04 DIAGNOSIS — E03.9 HYPOTHYROIDISM, UNSPECIFIED TYPE: Primary | ICD-10-CM

## 2023-12-04 RX ORDER — TIOTROPIUM BROMIDE 18 UG/1
18 CAPSULE ORAL; RESPIRATORY (INHALATION) DAILY
Qty: 30 CAPSULE | Refills: 0 | Status: SHIPPED | OUTPATIENT
Start: 2023-12-04

## 2023-12-04 NOTE — TELEPHONE ENCOUNTER
Patient requesting lab order she stated that she is homebound so the lab that goes to her house is  [facility] lab home draw .  Number listed if you need to reach out to lab [phone] or [address]

## 2023-12-04 NOTE — TELEPHONE ENCOUNTER
Patient called in for another refill on different medication. Reason for call:   [x] Refill   [] Prior Auth  [] Other:     Office:   [x] PCP/Provider -  Broadt  [] Specialty/Provider -         Medication  ALPRAZolam (XANAX) 0.25 mg tablet    Pharmacy:  [facility] Dose: 0.25 mg Route: Oral Frequency: 2 times daily PRN for anxiety  Dispense Quantity: 180 tablet     Does the patient have enough for 3 days?    [] Yes   [x] No - Send as HP to POD

## 2023-12-04 NOTE — TELEPHONE ENCOUNTER
[id number] 11/10/2023 11/10/2023 Zolpidem Tartrate (Tablet, Extended Release) 30.0 30 6.25 MG NA HILTON LIND [facility] Commercial Insurance 0 / 0 [place]     1 [id number] 10/11/2023 10/10/2023 Zolpidem Tartrate (Tablet, Extended Release) 30.0 30 6.25 MG NA SALONI BROADT [facility]  [facility] / 0 [place]    1 [id number] 09/11/2023 09/11/2023 ACETAMINOPHEN 325 MG / HYDROcodone BITARTRATE 5 MG ORAL TABLET (Tablet) 60.0 15 5.0 MG/325.0 MG 20.0 SALONI BROADT [facility] [place] [place] / 0 [place]    1 [id number] 09/11/2023 09/11/2023 Zolpidem Tartrate (Tablet, Extended Release) 30.0 30 6.25 MG NA SALONI BROADT [facility] 0 / 0 [place]    1 [id number] 08/13/2023 08/11/2023 Zolpidem Tartrate (Tablet, Extended Release) 30.0 30 6.25 MG  [facility] I

## 2023-12-04 NOTE — TELEPHONE ENCOUNTER
Reason for call:   [x] Refill   [] Prior Auth  [] Other:     Office:   [x] PCP/Provider - Broadt   [] Specialty/Provider -     Medication: Arnold Wells     Dose/Frequency: 18mcg QD    Quantity: 30    Pharmacy: [address] [place] [address] #[id number]    Does the patient have enough for 3 days?    [] Yes   [x] No - Send as HP to POD

## 2023-12-05 RX ORDER — ALPRAZOLAM 0.25 MG/1
0.25 TABLET ORAL 2 TIMES DAILY PRN
Qty: 180 TABLET | Refills: 0 | Status: SHIPPED | OUTPATIENT
Start: 2023-12-05

## 2023-12-05 NOTE — TELEPHONE ENCOUNTER
Patient returned call to check status of blood test orders. Advised patient labs ordered and sent to [facility]. Will call to schedule them to come for home draw.  (Patient will call)

## 2023-12-07 LAB — HBA1C MFR BLD HPLC: 6.6 %

## 2023-12-11 ENCOUNTER — TELEPHONE (OUTPATIENT)
Age: 83
End: 2023-12-11

## 2023-12-11 NOTE — TELEPHONE ENCOUNTER
Patient called back in with daughter Andre Jason present to review lab results. RN reviewed  provider's comments with Andre Jason and explained the kidney function (BUN/Creatinin), triglycerides, and HbA1c. Patient reports she is still taking rosuvastatin and niacin. Last OV with Fr. Rocio Interiano for kidneys was 11/17/2021 with telephone and refill communications through 2022. Please follow up with patient for care advice, dietary changes, fu with nephrology, and other orders. If patient needs assistance with communication, please call daughter Sandeep Obrien who has her cell phone available while working.

## 2023-12-12 ENCOUNTER — TELEPHONE (OUTPATIENT)
Age: 83
End: 2023-12-12

## 2023-12-12 NOTE — TELEPHONE ENCOUNTER
Per daughter hold for Dr. Oakley Glenny will contact nephrology today about kidney levels, but wants to know   Is patient now diabetic with elevated A1C and triglycerides? Does her thyroid need adjusting?  Levels elevated also    Mom is also having testing for a blockage, ordered by Kandi Celso because she is unable to walk small distances without extreme fatigue

## 2023-12-12 NOTE — TELEPHONE ENCOUNTER
Daughter Michelle Addison called wanting to speak to office staff ONLY regarding her mother's most recent labs.

## 2023-12-13 DIAGNOSIS — I10 ESSENTIAL HYPERTENSION: ICD-10-CM

## 2023-12-13 DIAGNOSIS — N18.31 CKD STAGE G3A/A1, GFR 45-59 AND ALBUMIN CREATININE RATIO <30 MG/G (HCC): ICD-10-CM

## 2023-12-13 DIAGNOSIS — N25.81 SECONDARY HYPERPARATHYROIDISM OF RENAL ORIGIN (HCC): ICD-10-CM

## 2023-12-13 DIAGNOSIS — G47.00 INSOMNIA, UNSPECIFIED TYPE: ICD-10-CM

## 2023-12-13 DIAGNOSIS — E55.9 VITAMIN D DEFICIENCY: ICD-10-CM

## 2023-12-13 RX ORDER — CANDESARTAN 16 MG/1
16 TABLET ORAL
Qty: 90 TABLET | Refills: 3 | OUTPATIENT
Start: 2023-12-13

## 2023-12-13 RX ORDER — ZOLPIDEM TARTRATE 6.25 MG/1
6.25 TABLET, FILM COATED, EXTENDED RELEASE ORAL
Qty: 30 TABLET | Refills: 0 | Status: SHIPPED | OUTPATIENT
Start: 2023-12-13

## 2023-12-13 NOTE — TELEPHONE ENCOUNTER
1 [id number] 12/05/2023 12/05/2023 ALPRAZolam (Tablet) 180.0 90 0.25 MG NA SALONI BROADT [facility] [place] 0 / 0 [place]    1 [id number] 11/10/2023 11/10/2023 Zolpidem Tartrate (Tablet, Extended Release) 30.0 30 6.25 MG NA [facility]  0 / 0 [place]    1 [id number] 10/11/2023 10/10/2023 Zolpidem Tartrate (Tablet, Extended Release) 30.0 30 6.25 MG NA [facility]  [facility] [place]    1 [id number] 09/11/2023 09/11/2023 ACETAMINOPHEN 325 MG / HYDROcodone BITARTRATE 5 MG ORAL TABLET (Tablet) 60.0 15 5.0 MG/325.0 MG 20.0 SALONI BROADT [facility] [place] 0 / 0 [place]    1 [id number] 09/11/2023 09/11/2023 Zolpidem Tartrate (Tablet, Extended Release) 30.0 30 6.25 MG NA SALONI BROADT [facility]  [facility] / 0 [place]    1 [id number] 08/13/2023 08/11/2023 Zolpidem Tartrate (Tablet, Extended Release) 30.0 30 6.25 MG NA [employer] [facility] / 0 [place]    1 [id number] 07/13/2023 07/12/2023 ACETAMINOPHEN 325 MG / HYDROcodone BITARTRATE 5 MG ORAL TABLET (Tablet) 60.0 15 5.0 MG/325.0 MG 20.0 SALONI BROADT [facility] [place] 0 / 0 [place]    1 [id number] 07/12/2023 07/12/2023 Zolpidem Tartrate (Tablet, Extended Release) 30.0 30 6.25 MG NA SALONI [facility]OADT [facility] 0 [place]    1 [id number] 06/08/2023 06/08/2023 Zolpidem Tartrate (Tablet, Extended Release) 30.0 30 6.25 MG NA HILTON DIOGO [facility] Commercial Insurance 0 / 0 [place]    1 [id number] 06/06/2023 06/06/2023 ACETAMINOPHEN 325 MG / HYDROcodone BITARTRATE 5 MG ORAL TABLET (Tablet) 60.0 15 5.0 MG/325.0 MG 20.0 [address] Medicare

## 2023-12-13 NOTE — TELEPHONE ENCOUNTER
Reason for call:   [x] Refill   [] Prior Auth  [] Other:     Office:   [x] PCP/Provider -Broadt   [] Specialty/Provider -     Medication: zolpidem (AMBIEN CR) 6.25 MG CR tablet     Dose/Frequency: Take 1 tablet (6.25 mg total) by mouth daily at bedtime as needed     Quantity: 30    Pharmacy: [address] [place] [address] #[id number] Denise Frederick, [address]     Does the patient have enough for 3 days?    [] Yes   [x] No - Send as HP to POD

## 2023-12-15 ENCOUNTER — TELEPHONE (OUTPATIENT)
Dept: NEPHROLOGY | Facility: CLINIC | Age: 83
End: 2023-12-15

## 2023-12-15 NOTE — TELEPHONE ENCOUNTER
Received call from pt daughter Dorina regarding scheduling an appointment, I advised there are no available appointments in the [facility], pt will be placed on wait list and recall list for March. Daughter stated pt only has 4 days left of calcitriol . 25 mcg tablets.  Pt just seen cardiologist and pt most recent lab work shows concern for kidney functions if daughter can be advised

## 2023-12-15 NOTE — TELEPHONE ENCOUNTER
Patient is unable to get in until march for a follow up as schedule is not yet released so unable to schedule. Patient asking for short term supply or should she call her PCP.

## 2023-12-18 DIAGNOSIS — E03.9 HYPOTHYROIDISM, UNSPECIFIED TYPE: ICD-10-CM

## 2023-12-18 RX ORDER — LEVOTHYROXINE SODIUM 0.12 MG/1
125 TABLET ORAL DAILY
Qty: 30 TABLET | Refills: 1 | Status: SHIPPED | OUTPATIENT
Start: 2023-12-18

## 2024-01-04 ENCOUNTER — TRANSITIONAL CARE MANAGEMENT (OUTPATIENT)
Dept: FAMILY MEDICINE CLINIC | Facility: CLINIC | Age: 84
End: 2024-01-04

## 2024-01-09 ENCOUNTER — TELEPHONE (OUTPATIENT)
Dept: NEPHROLOGY | Facility: CLINIC | Age: 84
End: 2024-01-09

## 2024-01-09 NOTE — TELEPHONE ENCOUNTER
Called pt's daughter Dorina to schedule follow up with Dr Bueno (recall list). Appt scheduled on 3/27/24 in the [facility].

## 2024-01-19 ENCOUNTER — RA CDI HCC (OUTPATIENT)
Dept: OTHER | Facility: HOSPITAL | Age: 84
End: 2024-01-19

## 2024-01-19 NOTE — PROGRESS NOTES
HCC coding opportunities          Chart Reviewed number of suggestions sent to Provider: 2     Patients Insurance     Medicare Insurance: [employer]vantage        This is a reminder to address all HCC (risk adjustment) codes for the year 2024 as patient GINO scores reset to zero.    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

## 2024-01-25 DIAGNOSIS — G47.00 INSOMNIA, UNSPECIFIED TYPE: ICD-10-CM

## 2024-01-25 RX ORDER — ZOLPIDEM TARTRATE 6.25 MG/1
6.25 TABLET, FILM COATED, EXTENDED RELEASE ORAL
Qty: 30 TABLET | Refills: 0 | Status: SHIPPED | OUTPATIENT
Start: 2024-01-25

## 2024-01-25 NOTE — TELEPHONE ENCOUNTER
1 [id number] 01/07/2024 01/07/2024 Zolpidem Tartrate (Tablet) 2.0 2 5 MG NA ELIZABETH RUBY [facility] Private Pay 0 / 0 [place]    1 [id number] 01/06/2024 01/06/2024 ALPRAZolam (Tablet) 20.0 2 0.25 MG NA LARISA GREENWOOD [facility] Private Pay 0 / 0 [place]    1 [id number] 01/06/2024 01/06/2024 Zolpidem Tartrate (Tablet, Extended Release) 10.0 10 6.25 MG NA LARISA GREENWOOD [facility] Private Pay 0 / 0 [place]    2 [id number] 12/13/2023 12/13/2023 Zolpidem Tartrate (Tablet, Extended Release) 30.0 30 6.25 MG NA HILTON LIND [facility] Medicare 0 / 0 [place]    2 [id number] 12/05/2023 12/05/2023 ALPRAZolam (Tablet) 180.0 90 0.25 MG NA SALONI BROADT [facility] [place] 0 / 0 [place]    2 [id number] 11/10/2023 11/10/2023 Zolpidem Tartrate (Tablet, Extended Release) 30.0 30 6.25 MG NA HILTON LIND [facility] Commercial Insurance 0 / 0 [place]    2 [id number] 10/11/2023 10/10/2023 Zolpidem Tartrate (Tablet, Extended Release) 30.0 30 6.25 MG NA SALONI BROADT [facility] Commercial Insurance 0 / 0 [place]    2 [id number] 09/11/2023 09/11/2023 ACETAMINOPHEN 325 MG / HYDROcodone BITARTRATE 5 MG ORAL TABLET (Tablet) 60.0 15 5.0 MG/325.0 MG 20.0 SALONI BROADT [facility] [place] 0 / 0 [place]    2 [id number] 09/11/2023 09/11/2023 Zolpidem Tartrate (Tablet, Extended Release) 30.0 30 6.25 MG NA SALONI BROADT [facility] Commercial Insurance 0 / 0 [place]    2 [id number] 08/13/2023 08/11/2023 Zolpidem Tartrate (Tablet, Extended Release) 30.0 30 6.25 MG NA HILTON DIOGO [facility] Commercial In

## 2024-01-25 NOTE — TELEPHONE ENCOUNTER
Reason for call:   [x] Refill   [] Prior Auth  [] Other:     Office:   [x] PCP/Provider - Orly Simpson  [] Specialty/Provider -     Medication: Zolpidem CR    Dose/Frequency: 6.25 mg Q HS PRN    Quantity: 30    Ph[facility]: [facility] [place]    Does the patient have enough for 3 days?   [x] Yes   [] No - Send as HP to POD

## 2024-02-02 ENCOUNTER — OFFICE VISIT (OUTPATIENT)
Dept: FAMILY MEDICINE CLINIC | Facility: CLINIC | Age: 84
End: 2024-02-02
Payer: COMMERCIAL

## 2024-02-02 VITALS
TEMPERATURE: 97.5 F | SYSTOLIC BLOOD PRESSURE: 126 MMHG | BODY MASS INDEX: 31.82 KG/M2 | WEIGHT: 186.4 LBS | HEART RATE: 71 BPM | DIASTOLIC BLOOD PRESSURE: 64 MMHG | OXYGEN SATURATION: 95 % | HEIGHT: 64 IN

## 2024-02-02 DIAGNOSIS — R05.9 COUGH, UNSPECIFIED TYPE: ICD-10-CM

## 2024-02-02 DIAGNOSIS — K92.1 GASTROINTESTINAL HEMORRHAGE WITH MELENA: ICD-10-CM

## 2024-02-02 DIAGNOSIS — N18.31 STAGE 3A CHRONIC KIDNEY DISEASE (HCC): ICD-10-CM

## 2024-02-02 DIAGNOSIS — D64.9 ANEMIA, UNSPECIFIED TYPE: Primary | ICD-10-CM

## 2024-02-02 PROCEDURE — 99214 OFFICE O/P EST MOD 30 MIN: CPT | Performed by: FAMILY MEDICINE

## 2024-02-02 RX ORDER — LANOLIN ALCOHOL/MO/W.PET/CERES
1 CREAM (GRAM) TOPICAL
COMMUNITY
Start: 2024-01-24

## 2024-02-02 RX ORDER — GUAIFENESIN 600 MG/1
600 TABLET, EXTENDED RELEASE ORAL 2 TIMES DAILY
COMMUNITY
Start: 2024-01-25

## 2024-02-02 RX ORDER — GABAPENTIN 100 MG/1
100 CAPSULE ORAL 2 TIMES DAILY
COMMUNITY
Start: 2024-01-24

## 2024-02-02 RX ORDER — DEXTROMETHORPHAN HYDROBROMIDE AND PROMETHAZINE HYDROCHLORIDE 15; 6.25 MG/5ML; MG/5ML
5 SYRUP ORAL 4 TIMES DAILY PRN
Qty: 180 ML | Refills: 0 | Status: SHIPPED | OUTPATIENT
Start: 2024-02-02

## 2024-02-07 PROBLEM — K92.1 GASTROINTESTINAL HEMORRHAGE WITH MELENA: Status: ACTIVE | Noted: 2024-02-07

## 2024-02-07 NOTE — PROGRESS NOTES
Patient ID: Kassandra Pacheco is a 83 y.o. female.    HPI: 83 y.o.female presents for evaluation of anemia post hospital stay and rehab stay.  She also has a persistant dry cough and stage 3aCKD.    SUBJECTIVE    Family History   Problem Relation Age of Onset    Hypertension Father         essential     Heart disease Father     Kidney disease Mother     Diabetes Mother      Social History     Socioeconomic History    Marital status:      Spouse name: Not on file    Number of children: 4    Years of education: less than high school    Highest education level: Not on file   Occupational History    Not on file   Tobacco Use    Smoking status: Former     Current packs/day: 0.00     Types: Cigarettes     Quit date: 3/1/2019     Years since quittin.9     Passive exposure: Current    Smokeless tobacco: Never   Substance and Sexual Activity    Alcohol use: No    Drug use: Never    Sexual activity: Not Currently   Other Topics Concern    Not on file   Social History Narrative    Daily coffee consumption:    Daily cola consumption:     Tea    Water intake, adequate (per day)     Social Determinants of Health     Financial Resource Strain: Medium Risk (2023)    Received from [facility]    Overall Financial Resource Strain (CARDIA)     Difficulty of Paying Living Expenses: Somewhat hard   Food Insecurity: No Food Insecurity (2023)    Received from [facility]    Hunger Vital Sign     Worried About Running Out of Food in the Last Year: Never true     Ran Out of Food in the Last Year: Never true   Transportation Needs: No Transportation Needs (2023)    Received from [facility]    PRAPARE - Transportation     Lack of Transportation (Medical): No     Lack of Transportation (Non-Medical): No   Physical Activity: Not on file   Stress: Not on file   Social Connections: Not on file   Intimate Partner Violence: Not At Risk (2023)    Received from  [facility]    Humiliation, Afraid, Rape, and Kick questionnaire     Fear of Current or Ex-Partner: No     Emotionally Abused: No     Physically Abused: No     Sexually Abused: No   Housing Stability: Low Risk  (12/28/2023)    Received from [facility]    Housing Stability Vital Sign     Unable to Pay for Housing in the Last Year: No     Number of Places Lived in the Last Year: 1     Unstable Housing in the Last Year: No     Past Medical History:   Diagnosis Date    Coronary artery disease     Hyperlipidemia     Hypertension     Peripheral vascular disease (HCC)      Past Surgical History:   Procedure Laterality Date    CARDIAC SURGERY      HYSTERECTOMY      age 31    OOPHORECTOMY Right     age 31    SMALL INTESTINE SURGERY      6 inches    TONSILLECTOMY      US GUIDED BREAST BIOPSY RIGHT COMPLETE Right 4/24/2019     No Known Allergies    Current Outpatient Medications:     albuterol (2.5 mg/3 mL) 0.083 % nebulizer solution, Take 3 mL (2.5 mg total) by nebulization every 6 (six) hours as needed for wheezing or shortness of breath, Disp: 300 mL, Rfl: 3    albuterol (PROVENTIL HFA,VENTOLIN HFA) 90 mcg/act inhaler, Inhale 2 puffs every 6 (six) hours as needed for wheezing or shortness of breath, Disp: 8 g, Rfl: 3    ALPRAZolam (XANAX) 0.25 mg tablet, Take 1 tablet (0.25 mg total) by mouth 2 (two) times a day as needed for anxiety, Disp: 180 tablet, Rfl: 0    aspirin (ECOTRIN LOW STRENGTH) 81 mg EC tablet, Take by mouth, Disp: , Rfl:     carvedilol (COREG) 12.5 mg tablet, Take 1 tablet (12.5 mg total) by mouth 2 (two) times a day with meals, Disp: 180 tablet, Rfl: 3    cholecalciferol (VITAMIN D3) 1,000 units tablet, Take 1,000 Units by mouth daily, Disp: , Rfl:     clopidogrel (PLAVIX) 75 mg tablet, Take 75 mg by mouth daily , Disp: , Rfl: 0    escitalopram (LEXAPRO) 10 mg tablet, Take 1 tablet (10 mg total) by mouth daily, Disp: 30 tablet, Rfl: 5    ferrous sulfate 325 (65 FE) MG EC  tablet, Take 1 tablet by mouth daily with breakfast, Disp: , Rfl:     fluticasone (FLONASE) 50 mcg/act nasal spray, 1 spray into each nostril daily, Disp: 16 g, Rfl: 0    gabapentin (NEURONTIN) 100 mg capsule, Take 100 mg by mouth 2 (two) times a day, Disp: , Rfl:     guaiFENesin (MUCINEX) 600 mg 12 hr tablet, Take 600 mg by mouth 2 (two) times a day, Disp: , Rfl:     HYDROcodone-acetaminophen (NORCO) 5-325 mg per tablet, Take 1 tablet by mouth every 6 (six) hours as needed for pain Max Daily Amount: 4 tablets (Patient not taking: Reported on 2/2/2024), Disp: 60 tablet, Rfl: 0    levothyroxine 125 mcg tablet, Take 1 tablet (125 mcg total) by mouth daily, Disp: 30 tablet, Rfl: 1    pantoprazole (PROTONIX) 40 mg tablet, Take 1 tablet (40 mg total) by mouth 2 (two) times a day, Disp: 180 tablet, Rfl: 1    promethazine-dextromethorphan (PHENERGAN-DM) 6.25-15 mg/5 mL oral syrup, Take 5 mL by mouth 4 (four) times a day as needed for cough, Disp: 180 mL, Rfl: 0    rosuvastatin (CRESTOR) 40 MG tablet, Take 40 mg by mouth daily, Disp: , Rfl:     senna (SENOKOT) 8.6 MG tablet, Take 1 tablet by mouth daily, Disp: , Rfl:     tiotropium (Spiriva HandiHaler) 18 mcg inhalation capsule, Place 1 capsule (18 mcg total) into inhaler and inhale daily, Disp: 30 capsule, Rfl: 0    torsemide (DEMADEX) 20 mg tablet, Take 1 tablet (20 mg total) by mouth daily, Disp: 90 tablet, Rfl: 3    zolpidem (AMBIEN CR) 6.25 MG CR tablet, Take 1 tablet (6.25 mg total) by mouth daily at bedtime as needed for sleep, Disp: 30 tablet, Rfl: 0    acetaminophen (TYLENOL) 500 mg tablet, Take 500 mg by mouth every 6 (six) hours, Disp: , Rfl:     calcitriol (ROCALTROL) 0.25 mcg capsule, Take 1 capsule (0.25 mcg total) by mouth daily, Disp: 90 capsule, Rfl: 3    Diclofenac Sodium (VOLTAREN) 1 %, Apply 2 g topically 4 (four) times a day (Patient not taking: Reported on 2/2/2024), Disp: 100 g, Rfl: 3    varenicline (CHANTIX) 1 mg tablet, Take 1 tablet (1 mg total)  "by mouth 2 (two) times a day (Patient not taking: Reported on 2/2/2024), Disp: 60 tablet, Rfl: 3    Review of Systems  Constitutional:     Denies fever, chills ,weakness ,weight loss, weight gain  + fatigue   ENT: Denies earache ,loss of hearing ,nosebleed, nasal discharge,nasal congestion ,sore throat ,hoarseness  Pulmonary: Denies shortness of breath ,dyspnea on exertion, orthopnea  ,+PND + dry cough   Cardiovascular:  Denies bradycardia , tachycardia  ,palpations, lower extremity edema leg, claudication  Breast:  Denies new or changing breast lumps ,nipple discharge ,nipple changes  Abdomen:  Denies abdominal pain , anorexia , indigestion, nausea, vomiting, constipation, diarrhea  Musculoskeletal: Denies myalgias, arthralgias, joint swelling, joint stiffness , limb pain, limb swelling  Gu: denies dysuria, polyuria  Skin: Denies skin rash, skin lesion, skin wound, itching, dry skin  Neuro: Denies headache, numbness, tingling, confusion, loss of consciousness, dizziness, vertigo  Psychiatric: Denies feelings of depression, suicidal ideation, anxiety, sleep disturbances    OBJECTIVE  /64   Pulse 71   Temp 97.5 °F (36.4 °C)   Ht 5' 4\" (1.626 m)   Wt 84.6 kg (186 lb 6.4 oz)   SpO2 95%   BMI 32.00 kg/m²   Constitutional:   NAD, well appearing and well nourished      ENT:   Conjunctiva and lids: no injection, edema, or discharge     Pupils and iris: MILADY bilaterally    External inspection of ears and nose: normal without deformities or discharge.      Otoscopic exam: Canals patent without erythema.       Nasal mucosa, septum and turbinates: Normal or edema or discharge         Oropharynx:  Moist mucosa, normal tongue and tonsils without lesions. No erythema  + pnd      Pulmonary:Respiratory effort normal rate and rhythm, no increased work of breathing. Auscultation of lungs:  Clear bilaterally with no adventitious breath sounds       Cardiovascular: regular rate and rhythm, S1 and S2, no murmur, no edema " and/or varicosities of LE      Abdomen: Soft and non-distended     Positive bowel sounds      No heptomegaly or splenomegaly      Gu: no suprapubic tenderness or CVA tenderness, no urethral discharge  Lymphatic:  No anterior or posterior cervical lymphadenopathy         Musculoskeletal:  Gait and station: Normal gait      Digits and nails normal without clubbing or cyanosis       Inspection/palpation of joints, bones, and muscles:  No joint tenderness, swelling, full active and passive range of motion       Skin: Normal skin turgor and no rashes      Neuro:      Normal reflexes     Psych:   alert and oriented to person, place and time     normal mood and affect       Assessment/Plan:Diagnoses and all orders for this visit:    Anemia, unspecified type  Comments:  Will check hgb to make sure that it is stable.  Orders:  -     CBC and differential; Future    Cough, unspecified type  Comments:  Trial of promethazine dm.  Orders:  -     promethazine-dextromethorphan (PHENERGAN-DM) 6.25-15 mg/5 mL oral syrup; Take 5 mL by mouth 4 (four) times a day as needed for cough    Stage 3a chronic kidney disease (HCC)  Comments:  Continue renal surveillence    Gastrointestinal hemorrhage with melena  Comments:  Pt to follow up with GI for EGD and colonoscopy.    Other orders  -     ferrous sulfate 325 (65 FE) MG EC tablet; Take 1 tablet by mouth daily with breakfast  -     gabapentin (NEURONTIN) 100 mg capsule; Take 100 mg by mouth 2 (two) times a day  -     guaiFENesin (MUCINEX) 600 mg 12 hr tablet; Take 600 mg by mouth 2 (two) times a day        Reviewed with patient plan to treat with above plan.    Patient instructed to call in 72 hours if not feeling better or if symptoms worsen

## 2024-02-08 ENCOUNTER — TELEPHONE (OUTPATIENT)
Dept: LAB | Facility: HOSPITAL | Age: 84
End: 2024-02-08

## 2024-02-08 ENCOUNTER — TELEPHONE (OUTPATIENT)
Age: 84
End: 2024-02-08

## 2024-02-08 DIAGNOSIS — E11.42 TYPE 2 DIABETES MELLITUS WITH DIABETIC POLYNEUROPATHY, WITHOUT LONG-TERM CURRENT USE OF INSULIN (HCC): ICD-10-CM

## 2024-02-08 DIAGNOSIS — E78.00 HYPERCHOLESTEROLEMIA: Primary | ICD-10-CM

## 2024-02-08 DIAGNOSIS — D64.9 ANEMIA, UNSPECIFIED TYPE: ICD-10-CM

## 2024-02-08 DIAGNOSIS — J45.909 UNCOMPLICATED ASTHMA, UNSPECIFIED ASTHMA SEVERITY, UNSPECIFIED WHETHER PERSISTENT: ICD-10-CM

## 2024-02-08 RX ORDER — ALBUTEROL SULFATE 2.5 MG/3ML
2.5 SOLUTION RESPIRATORY (INHALATION) EVERY 6 HOURS PRN
Qty: 300 ML | Refills: 3 | Status: SHIPPED | OUTPATIENT
Start: 2024-02-08

## 2024-02-08 NOTE — TELEPHONE ENCOUNTER
Patient calling to find out when will the blood work be put in the system for [facility] to draw blood. Also wants to know when she can draw her hemoglobin as well.  Tonya Ruiz

## 2024-02-08 NOTE — TELEPHONE ENCOUNTER
Spoke with patient to expect call from [facility]. I called [facility] who will reach out to schedule..

## 2024-02-12 ENCOUNTER — TELEPHONE (OUTPATIENT)
Age: 84
End: 2024-02-12

## 2024-02-21 ENCOUNTER — TELEPHONE (OUTPATIENT)
Age: 84
End: 2024-02-21

## 2024-02-21 ENCOUNTER — APPOINTMENT (OUTPATIENT)
Dept: LAB | Facility: HOSPITAL | Age: 84
End: 2024-02-21
Attending: FAMILY MEDICINE
Payer: COMMERCIAL

## 2024-02-21 DIAGNOSIS — I10 ESSENTIAL HYPERTENSION: ICD-10-CM

## 2024-02-21 DIAGNOSIS — D64.9 ANEMIA, UNSPECIFIED TYPE: ICD-10-CM

## 2024-02-21 DIAGNOSIS — N17.9 AKI (ACUTE KIDNEY INJURY) (HCC): ICD-10-CM

## 2024-02-21 DIAGNOSIS — D50.9 IRON DEFICIENCY ANEMIA, UNSPECIFIED IRON DEFICIENCY ANEMIA TYPE: Primary | ICD-10-CM

## 2024-02-21 DIAGNOSIS — E03.9 HYPOTHYROIDISM, UNSPECIFIED TYPE: ICD-10-CM

## 2024-02-21 DIAGNOSIS — F32.9 REACTIVE DEPRESSION: ICD-10-CM

## 2024-02-21 DIAGNOSIS — E78.00 HYPERCHOLESTEROLEMIA: ICD-10-CM

## 2024-02-21 DIAGNOSIS — E11.42 TYPE 2 DIABETES MELLITUS WITH DIABETIC POLYNEUROPATHY, WITHOUT LONG-TERM CURRENT USE OF INSULIN (HCC): ICD-10-CM

## 2024-02-21 DIAGNOSIS — N18.31 CKD STAGE G3A/A1, GFR 45-59 AND ALBUMIN CREATININE RATIO <30 MG/G (HCC): ICD-10-CM

## 2024-02-21 PROBLEM — J18.9 BILATERAL PNEUMONIA: Status: RESOLVED | Noted: 2020-07-03 | Resolved: 2024-02-21

## 2024-02-21 LAB
ALBUMIN SERPL BCP-MCNC: 3.9 G/DL (ref 3.5–5)
ALP SERPL-CCNC: 72 U/L (ref 34–104)
ALT SERPL W P-5'-P-CCNC: 12 U/L (ref 7–52)
ANION GAP SERPL CALCULATED.3IONS-SCNC: 5 MMOL/L
AST SERPL W P-5'-P-CCNC: 18 U/L (ref 13–39)
BASOPHILS # BLD AUTO: 0.08 THOUSANDS/ÂΜL (ref 0–0.1)
BASOPHILS NFR BLD AUTO: 1 % (ref 0–1)
BILIRUB SERPL-MCNC: 0.33 MG/DL (ref 0.2–1)
BUN SERPL-MCNC: 20 MG/DL (ref 5–25)
CALCIUM SERPL-MCNC: 9.3 MG/DL (ref 8.4–10.2)
CHLORIDE SERPL-SCNC: 105 MMOL/L (ref 96–108)
CHOLEST SERPL-MCNC: 142 MG/DL
CO2 SERPL-SCNC: 31 MMOL/L (ref 21–32)
CREAT SERPL-MCNC: 0.98 MG/DL (ref 0.6–1.3)
EOSINOPHIL # BLD AUTO: 0.61 THOUSAND/ÂΜL (ref 0–0.61)
EOSINOPHIL NFR BLD AUTO: 11 % (ref 0–6)
ERYTHROCYTE [DISTWIDTH] IN BLOOD BY AUTOMATED COUNT: 24.1 % (ref 11.6–15.1)
EST. AVERAGE GLUCOSE BLD GHB EST-MCNC: 94 MG/DL
GFR SERPL CREATININE-BSD FRML MDRD: 53 ML/MIN/1.73SQ M
GLUCOSE P FAST SERPL-MCNC: 105 MG/DL (ref 65–99)
HBA1C MFR BLD: 4.9 %
HCT VFR BLD AUTO: 31.9 % (ref 34.8–46.1)
HDLC SERPL-MCNC: 72 MG/DL
HGB BLD-MCNC: 10.5 G/DL (ref 11.5–15.4)
IMM GRANULOCYTES # BLD AUTO: 0.01 THOUSAND/UL (ref 0–0.2)
IMM GRANULOCYTES NFR BLD AUTO: 0 % (ref 0–2)
LDLC SERPL CALC-MCNC: 45 MG/DL (ref 0–100)
LYMPHOCYTES # BLD AUTO: 1.65 THOUSANDS/ÂΜL (ref 0.6–4.47)
LYMPHOCYTES NFR BLD AUTO: 28 % (ref 14–44)
MCH RBC QN AUTO: 31.1 PG (ref 26.8–34.3)
MCHC RBC AUTO-ENTMCNC: 32.9 G/DL (ref 31.4–37.4)
MCV RBC AUTO: 94 FL (ref 82–98)
MONOCYTES # BLD AUTO: 0.7 THOUSAND/ÂΜL (ref 0.17–1.22)
MONOCYTES NFR BLD AUTO: 12 % (ref 4–12)
NEUTROPHILS # BLD AUTO: 2.76 THOUSANDS/ÂΜL (ref 1.85–7.62)
NEUTS SEG NFR BLD AUTO: 48 % (ref 43–75)
NONHDLC SERPL-MCNC: 70 MG/DL
NRBC BLD AUTO-RTO: 0 /100 WBCS
PLATELET # BLD AUTO: 296 THOUSANDS/UL (ref 149–390)
PMV BLD AUTO: 10.5 FL (ref 8.9–12.7)
POTASSIUM SERPL-SCNC: 4.4 MMOL/L (ref 3.5–5.3)
PROT SERPL-MCNC: 6.9 G/DL (ref 6.4–8.4)
RBC # BLD AUTO: 3.38 MILLION/UL (ref 3.81–5.12)
SODIUM SERPL-SCNC: 141 MMOL/L (ref 135–147)
TRIGL SERPL-MCNC: 125 MG/DL
TSH SERPL DL<=0.05 MIU/L-ACNC: 0.26 UIU/ML (ref 0.45–4.5)
WBC # BLD AUTO: 5.81 THOUSAND/UL (ref 4.31–10.16)

## 2024-02-21 PROCEDURE — 84443 ASSAY THYROID STIM HORMONE: CPT

## 2024-02-21 PROCEDURE — 80061 LIPID PANEL: CPT

## 2024-02-21 PROCEDURE — 83036 HEMOGLOBIN GLYCOSYLATED A1C: CPT

## 2024-02-21 PROCEDURE — 80053 COMPREHEN METABOLIC PANEL: CPT

## 2024-02-21 PROCEDURE — 85025 COMPLETE CBC W/AUTO DIFF WBC: CPT

## 2024-02-21 PROCEDURE — 36415 COLL VENOUS BLD VENIPUNCTURE: CPT

## 2024-02-21 RX ORDER — LANOLIN ALCOHOL/MO/W.PET/CERES
1 CREAM (GRAM) TOPICAL
Qty: 30 TABLET | Refills: 3 | Status: SHIPPED | OUTPATIENT
Start: 2024-02-21

## 2024-02-21 RX ORDER — ESCITALOPRAM OXALATE 10 MG/1
10 TABLET ORAL DAILY
Qty: 30 TABLET | Refills: 5 | Status: SHIPPED | OUTPATIENT
Start: 2024-02-21

## 2024-02-21 RX ORDER — CANDESARTAN 16 MG/1
16 TABLET ORAL
Qty: 30 TABLET | Refills: 3 | Status: SHIPPED | OUTPATIENT
Start: 2024-02-21

## 2024-02-21 RX ORDER — CARVEDILOL 12.5 MG/1
12.5 TABLET ORAL 2 TIMES DAILY WITH MEALS
Qty: 180 TABLET | Refills: 3 | Status: SHIPPED | OUTPATIENT
Start: 2024-02-21 | End: 2025-02-20

## 2024-02-21 NOTE — TELEPHONE ENCOUNTER
Reason for call:     Patient called rx line requesting refills for medications not previously prescribed by Dr Simpson. States she has not seen the kidney doctor in awhile and was told buy that office to call PCP. Please review. Please call patient to advise.     [x] Refill   [] Prior Auth  [] Other:     Office:   [x] PCP/Provider - Dr Simpson  [] Specialty/Provider -     Medication:   Carvedilol 12.5 mg, 1 bid, 180 (previously prescribed by Dr Bueno)    Candasartin cilexetil 16 mg, 1 hs (previously prescribed by Dr Bueno - patinet states last prescribed 9/11/23 she has not taken it in a while.    Ferrous sulfate 325, 1 tablet daily, states prescribed when she was at a nursing facility.    Pharmacy:   [facility], [place]

## 2024-02-21 NOTE — TELEPHONE ENCOUNTER
Reason for call:   [x] Refill   [] Prior Auth  [] Other:     Office:   [x] PCP/Provider - Dr Simpson  [] Specialty/Provider -     Medication:   Escitalopram 10 mg, 1 qd, 30    [facility] [place]    Does the patient have enough for 3 days?   [x] Yes   [] No - Send as HP to POD

## 2024-02-22 ENCOUNTER — TELEPHONE (OUTPATIENT)
Dept: FAMILY MEDICINE CLINIC | Facility: CLINIC | Age: 84
End: 2024-02-22

## 2024-02-22 DIAGNOSIS — E03.9 HYPOTHYROIDISM, UNSPECIFIED TYPE: Primary | ICD-10-CM

## 2024-02-22 NOTE — TELEPHONE ENCOUNTER
----- Message from Orly Simpson DO sent at 2/22/2024  7:52 AM EST -----  Have her take her levothyroxine 5 days a week instead of 7 days and recheck her tsh in 6 weeks.

## 2024-02-23 NOTE — TELEPHONE ENCOUNTER
Left entire message on daughter Dorina's VM. Asked Dorina to call back and verify she received message.

## 2024-02-27 DIAGNOSIS — R05.9 COUGH, UNSPECIFIED TYPE: ICD-10-CM

## 2024-02-27 DIAGNOSIS — G47.00 INSOMNIA, UNSPECIFIED TYPE: ICD-10-CM

## 2024-02-27 RX ORDER — DEXTROMETHORPHAN HYDROBROMIDE AND PROMETHAZINE HYDROCHLORIDE 15; 6.25 MG/5ML; MG/5ML
5 SYRUP ORAL 4 TIMES DAILY PRN
Qty: 180 ML | Refills: 0 | Status: SHIPPED | OUTPATIENT
Start: 2024-02-27

## 2024-02-27 RX ORDER — ZOLPIDEM TARTRATE 6.25 MG/1
6.25 TABLET, FILM COATED, EXTENDED RELEASE ORAL
Qty: 30 TABLET | Refills: 0 | Status: SHIPPED | OUTPATIENT
Start: 2024-02-27

## 2024-02-27 NOTE — TELEPHONE ENCOUNTER
1 [id number] 01/25/2024 01/25/2024 Zolpidem Tartrate (Tablet, Extended Release) 30.0 30 6.25 MG NA SALONI BROADT [facility] Medicare 0 / 0 [place]    2 [id number] 01/07/2024 01/07/2024 Zolpidem Tartrate (Tablet) 2.0 2 5 MG NA ELIZABETH RUBY [facility]MNICARE [facility] Private Pay 0 / 0 [place]    2 [id number] 01/06/2024 01/06/2024 ALPRAZolam (Tablet) 20.0 2 0.25 MG NA LARISA GREENWOOD [facility] Private Pay 0 / 0 [place]    2 [id number] 01/06/2024 01/06/2024 Zolpidem Tartrate (Tablet, Extended Release) 10.0 10 6.25 MG NA [facility] [place]    1 [id number] 12/13/2023 12/13/2023 Zolpidem Tartrate (Tablet, Extended Release) 30.0 30 6.25 MG NA HILTON LIND [facility] Medicare 0 / 0 [place]    1 [id number] 12/05/2023 12/05/2023 ALPRAZolam (Tablet) 180.0 90 0.25 MG NA SALONI BROADT [facility] Medicare 0 / 0 [place]    1 [id number] 11/10/2023 11/10/2023 Zolpidem Tartrate (Tablet, Extended Release) 30.0 30 6.25 MG NA HILTON DIOGO [facility] Commercial Insurance 0 / 0 [place]    1 [id number] 10/11/2023 10/10/2023 Zolpidem Tartrate (Tablet, Extended Release) 30.0 30 6.25 MG NA SALONI BROADT [facility] Commercial Insurance 0 / 0 [place]    1 [id number] 09/11/2023 09/11/2023 ACETAMINOPHEN 325 MG / HYDROcodone BITARTRATE 5 MG ORAL TABLET (Tablet) 60.0 15 5.0 MG/325.0 MG 20.0 SALONI BROADT [facility] Medicare 0 / 0 [place]    1 [id number] 09/11/2023 09/11/2023 Zolpidem Tartrate (Tablet, Extended Release) 30.0 30 6.25 MG NA SALONI COPE [facility] [place] Commercial Insurance 0 / 0 [place]

## 2024-02-27 NOTE — TELEPHONE ENCOUNTER
Reason for call:   [x] Refill   [] Prior Auth  [] Other:     Office:   [x] PCP/Provider -   Orly Simpson,   PCP - General  [] Specialty/Provider -     Medication: promethazine-dextromethorphan (PHENERGAN-DM) 6.25-15 mg/5 mL oral syrup       zolpidem (AMBIEN CR) 6.25 MG CR tablet     [id number] [place] - [address] [facility]    Does the patient have enough for 3 days?   [] Yes   [x] No - Send as HP to POD

## 2024-02-27 NOTE — TELEPHONE ENCOUNTER
Refill must be reviewed and completed by the office or provider. The refill is unable to be approved by the medication management team.    Zolpidem cannot be delegated      Please review to see if the refill is appropriate.    Promethazine-dextromethorphan (phenergan)

## 2024-03-20 ENCOUNTER — TELEPHONE (OUTPATIENT)
Age: 84
End: 2024-03-20

## 2024-03-20 NOTE — TELEPHONE ENCOUNTER
Noa with [facility] called to inform patient has been discharged 3/19/2024 and is doing well. If there is any questions Noa can be reached at [phone]

## 2024-03-21 ENCOUNTER — TELEPHONE (OUTPATIENT)
Dept: OTHER | Facility: HOSPITAL | Age: 84
End: 2024-03-21

## 2024-03-21 DIAGNOSIS — I10 ESSENTIAL HYPERTENSION: ICD-10-CM

## 2024-03-21 DIAGNOSIS — N17.9 AKI (ACUTE KIDNEY INJURY) (HCC): ICD-10-CM

## 2024-03-21 DIAGNOSIS — N18.31 CKD STAGE G3A/A1, GFR 45-59 AND ALBUMIN CREATININE RATIO <30 MG/G (HCC): ICD-10-CM

## 2024-03-21 DIAGNOSIS — E03.9 HYPOTHYROIDISM, UNSPECIFIED TYPE: ICD-10-CM

## 2024-03-21 RX ORDER — LEVOTHYROXINE SODIUM 0.12 MG/1
125 TABLET ORAL DAILY
Qty: 30 TABLET | Refills: 5 | Status: SHIPPED | OUTPATIENT
Start: 2024-03-21

## 2024-03-21 RX ORDER — TORSEMIDE 20 MG/1
20 TABLET ORAL DAILY
Qty: 90 TABLET | Refills: 1 | Status: SHIPPED | OUTPATIENT
Start: 2024-03-21 | End: 2024-09-17

## 2024-03-21 NOTE — TELEPHONE ENCOUNTER
Patient call to have Gabapetin 300 mg refilled by pcp.   Had to call Patient back because that medication was filled by [facility] pain management.( Patient understood)

## 2024-03-21 NOTE — TELEPHONE ENCOUNTER
Reason for call:   [x] Refill   [] Prior Auth  [] Other:     Office:   [] PCP/Provider -   [x] Specialty/Provider -     Medication: torsemide (DEMADEX) 20 mg tablet   Dose/Frequency: : Take 1 tablet (20 mg total) by mouth daily     Quantity: 90 tablet     Pharmacy: [id number] - [place] - [address] [place] [address] [phone]     Does the patient have enough for 3 days?   [x] Yes   [] No - Send as HP to POD

## 2024-03-21 NOTE — TELEPHONE ENCOUNTER
Reason for call:   [x] Refill   [] Prior Auth  [] Other:     Office:   [x] PCP/Provider -   [] Specialty/Provider -     Medication: levothyroxine 125 mcg tablet     Dose/Frequency: Take 1 tablet (125 mcg total) by mouth daily,     Quantity: e: 30 tablet     Pharmacy:   [id number] [place]       Does the patient have enough for 3 days?   [x] Yes   [] No - Send as HP to POD

## 2024-03-28 ENCOUNTER — TELEPHONE (OUTPATIENT)
Dept: NEPHROLOGY | Facility: CLINIC | Age: 84
End: 2024-03-28

## 2024-04-01 DIAGNOSIS — J44.9 CHRONIC OBSTRUCTIVE PULMONARY DISEASE, UNSPECIFIED COPD TYPE (HCC): ICD-10-CM

## 2024-04-01 DIAGNOSIS — G47.00 INSOMNIA, UNSPECIFIED TYPE: ICD-10-CM

## 2024-04-01 RX ORDER — TIOTROPIUM BROMIDE 18 UG/1
18 CAPSULE ORAL; RESPIRATORY (INHALATION) DAILY
Qty: 30 CAPSULE | Refills: 0 | Status: SHIPPED | OUTPATIENT
Start: 2024-04-01

## 2024-04-01 NOTE — TELEPHONE ENCOUNTER
1 [id number] 02/27/2024 02/27/2024 Zolpidem Tartrate (Tablet, Extended Release) 30.0 30 6.25 MG NA SALONI BROADT [facility] Medicare 0 / 0 [place]    1 [id number] 01/25/2024 01/25/2024 Zolpidem Tartrate (Tablet, Extended Release) 30.0 30 6.25 MG NA SALONI BROADT [facility] Medicare 0 / 0 [place]    2 [id number] 01/07/2024 01/07/2024 Zolpidem Tartrate (Tablet) 2.0 2 5 MG NA ELIZABETH MENZONI [employer] [facility] Private Pay 0 / 0 PA    2 [id number] 01/06/2024 01/06/2024 ALPRAZolam (Tablet) 20.0 2 0.25 MG NA LARISA GREENWOOD [facility] Private Pay 0 / 0 [place]    2 [id number] 01/06/2024 01/06/2024 Zolpidem Tartrate (Tablet, Extended Release) 10.0 10 6.25 MG NA LARISA GREENWOOD [facility] Private Pay 0 / 0 PA    1 [id number] 12/13/2023 12/13/2023 Zolpidem Tartrate (Tablet, Extended Release) 30.0 30 6.25 MG NA HILTON DIOGO [facility] Medicare 0 / 0 [place]    1 [id number] 12/05/2023 12/05/2023 ALPRAZolam (Tablet) 180.0 90 0.25 MG NA SALONI BROADT [facility] Medicare 0 / 0 [place]    1 [id number] 11/10/2023 11/10/2023 Zolpidem Tartrate (Tablet, Extended Release) 30.0 30 6.25 MG NA HILTON DIOGO [facility] Commercial Insurance 0 / 0 [place]    1 [id number] 10/11/2023 10/10/2023 Zolpidem Tartrate (Tablet, Extended Release) 30.0 30 6.25 MG NA SALONI BROADT [facility] Commercial Insurance 0 / 0 [place]    1 [id number] 09/11/2023 09/11/2023 ACETAMINOPHEN 325 MG / HYDROcodone BITARTRATE 5 MG ORAL TABLET (Tablet) 60.0 15 5.0 MG/325.0 MG 20.0 SALONI BROADT [facility]

## 2024-04-02 DIAGNOSIS — G47.00 INSOMNIA, UNSPECIFIED TYPE: ICD-10-CM

## 2024-04-02 NOTE — TELEPHONE ENCOUNTER
Pt was calling in stating she called yesterday to get a refill on the prescription Zolpidem (Ambien CR) 6.25 mg and Tiotropium (Spiriva HandiHaler) 18 mcg. Pt stated she only received the Tiotropium but did not receive the Zolpidem. Pt is asking if we can sent this prescription to [facility] on [address] [place] [address]. Please advise with the pt if needed thank you.

## 2024-04-03 RX ORDER — ZOLPIDEM TARTRATE 6.25 MG/1
6.25 TABLET, FILM COATED, EXTENDED RELEASE ORAL
Qty: 30 TABLET | Refills: 0 | Status: SHIPPED | OUTPATIENT
Start: 2024-04-03 | End: 2024-05-03

## 2024-04-03 RX ORDER — ZOLPIDEM TARTRATE 6.25 MG/1
6.25 TABLET, FILM COATED, EXTENDED RELEASE ORAL
Qty: 30 TABLET | Refills: 0 | Status: SHIPPED | OUTPATIENT
Start: 2024-04-03

## 2024-04-03 NOTE — TELEPHONE ENCOUNTER
Patient called the RX Refill Line. Message is being forwarded to the office.     Patient called to check on her Zolpidem. She stated this is her third time calling and she is completely out of medication. I informed her It is awaiting the doctors approval and I would send a High Priority message to try to get this taken care of for her.    Patient was thankful and just wanted to mention again that she is out of the medication.

## 2024-04-04 DIAGNOSIS — G47.00 INSOMNIA, UNSPECIFIED TYPE: Primary | ICD-10-CM

## 2024-04-04 RX ORDER — ZOLPIDEM TARTRATE 10 MG/1
10 TABLET ORAL
Qty: 30 TABLET | Refills: 0 | Status: SHIPPED | OUTPATIENT
Start: 2024-04-04

## 2024-04-22 DIAGNOSIS — K21.9 GASTROESOPHAGEAL REFLUX DISEASE WITHOUT ESOPHAGITIS: ICD-10-CM

## 2024-04-22 NOTE — TELEPHONE ENCOUNTER
Reason for call:   [x] Refill   [] Prior Auth  [] Other:     Office:   [x] PCP/Provider -   [] Specialty/Provider -     Medication: Pantoprazole     Dose/Frequency: 40 mg tablet taken by mouth once daily     Quantity: 90    Pharmacy: [id number] [place] - [address] [place] [address] [phone]     Does the patient have enough for 3 days?   [x] Yes   [] No - Send as HP to POD

## 2024-04-23 RX ORDER — PANTOPRAZOLE SODIUM 40 MG/1
40 TABLET, DELAYED RELEASE ORAL 2 TIMES DAILY
Qty: 180 TABLET | Refills: 0 | Status: SHIPPED | OUTPATIENT
Start: 2024-04-23

## 2024-04-26 ENCOUNTER — RA CDI HCC (OUTPATIENT)
Dept: OTHER | Facility: HOSPITAL | Age: 84
End: 2024-04-26

## 2024-04-26 NOTE — PROGRESS NOTES
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

## 2024-05-02 ENCOUNTER — OFFICE VISIT (OUTPATIENT)
Dept: FAMILY MEDICINE CLINIC | Facility: CLINIC | Age: 84
End: 2024-05-02
Payer: COMMERCIAL

## 2024-05-02 VITALS
SYSTOLIC BLOOD PRESSURE: 140 MMHG | TEMPERATURE: 97.7 F | WEIGHT: 190.2 LBS | BODY MASS INDEX: 32.47 KG/M2 | HEIGHT: 64 IN | OXYGEN SATURATION: 94 % | DIASTOLIC BLOOD PRESSURE: 70 MMHG | HEART RATE: 60 BPM

## 2024-05-02 DIAGNOSIS — N25.81 SECONDARY HYPERPARATHYROIDISM OF RENAL ORIGIN (HCC): ICD-10-CM

## 2024-05-02 DIAGNOSIS — Z78.0 ASYMPTOMATIC POSTMENOPAUSAL ESTROGEN DEFICIENCY: ICD-10-CM

## 2024-05-02 DIAGNOSIS — Z00.00 MEDICARE ANNUAL WELLNESS VISIT, SUBSEQUENT: Primary | ICD-10-CM

## 2024-05-02 DIAGNOSIS — E03.9 HYPOTHYROIDISM, UNSPECIFIED TYPE: ICD-10-CM

## 2024-05-02 DIAGNOSIS — E78.00 HYPERCHOLESTEROLEMIA: ICD-10-CM

## 2024-05-02 DIAGNOSIS — I10 ESSENTIAL HYPERTENSION: ICD-10-CM

## 2024-05-02 PROCEDURE — G0439 PPPS, SUBSEQ VISIT: HCPCS | Performed by: FAMILY MEDICINE

## 2024-05-02 PROCEDURE — 99214 OFFICE O/P EST MOD 30 MIN: CPT | Performed by: FAMILY MEDICINE

## 2024-05-02 RX ORDER — GABAPENTIN 300 MG/1
300 CAPSULE ORAL 2 TIMES DAILY
COMMUNITY
Start: 2024-04-26

## 2024-05-02 RX ORDER — SODIUM, POTASSIUM,MAG SULFATES 17.5-3.13G
SOLUTION, RECONSTITUTED, ORAL ORAL
COMMUNITY
Start: 2024-02-22

## 2024-05-02 RX ORDER — HYDROCORTISONE 25 MG/G
CREAM TOPICAL
COMMUNITY
Start: 2024-04-10

## 2024-05-02 NOTE — PATIENT INSTRUCTIONS
Medicare Preventive Visit Patient Instructions  Thank you for completing your Welcome to Medicare Visit or Medicare Annual Wellness Visit today. Your next wellness visit will be due in one year (5/3/2025).  The screening/preventive services that you may require over the next 5-10 years are detailed below. Some tests may not apply to you based off risk factors and/or age. Screening tests ordered at today's visit but not completed yet may show as past due. Also, please note that scanned in results may not display below.  Preventive Screenings:  Service Recommendations Previous Testing/Comments   Colorectal Cancer Screening  * Colonoscopy    * Fecal Occult Blood Test (FOBT)/Fecal Immunochemical Test (FIT)  * Fecal DNA/Cologuard Test  * Flexible Sigmoidoscopy Age: 45-75 years old   Colonoscopy: every 10 years (may be performed more frequently if at higher risk)  OR  FOBT/FIT: every 1 year  OR  Cologuard: every 3 years  OR  Sigmoidoscopy: every 5 years  Screening may be recommended earlier than age 45 if at higher risk for colorectal cancer. Also, an individualized decision between you and your healthcare provider will decide whether screening between the ages of 76-85 would be appropriate. Colonoscopy: Not on file  FOBT/FIT: Not on file  Cologuard: Not on file  Sigmoidoscopy: Not on file          Breast Cancer Screening Age: 40+ years old  Frequency: every 1-2 years  Not required if history of left and right mastectomy Mammogram: 04/17/2019        Cervical Cancer Screening Between the ages of 21-29, pap smear recommended once every 3 years.   Between the ages of 30-65, can perform pap smear with HPV co-testing every 5 years.   Recommendations may differ for women with a history of total hysterectomy, cervical cancer, or abnormal pap smears in past. Pap Smear: Not on file    Screening Not Indicated   Hepatitis C Screening Once for adults born between 1945 and 1965  More frequently in patients at high risk for Hepatitis C  Hep C Antibody: Not on file        Diabetes Screening 1-2 times per year if you're at risk for diabetes or have pre-diabetes Fasting glucose: 105 mg/dL (2/21/2024)  A1C: 4.9 % (2/21/2024)  Screening Not Indicated  History Diabetes   Cholesterol Screening Once every 5 years if you don't have a lipid disorder. May order more often based on risk factors. Lipid panel: 02/21/2024    Screening Not Indicated  History Lipid Disorder     Other Preventive Screenings Covered by Medicare:  Abdominal Aortic Aneurysm (AAA) Screening: covered once if your at risk. You're considered to be at risk if you have a family history of AAA.  Lung Cancer Screening: covers low dose CT scan once per year if you meet all of the following conditions: (1) Age 55-77; (2) No signs or symptoms of lung cancer; (3) Current smoker or have quit smoking within the last 15 years; (4) You have a tobacco smoking history of at least 20 pack years (packs per day multiplied by number of years you smoked); (5) You get a written order from a healthcare provider.  Glaucoma Screening: covered annually if you're considered high risk: (1) You have diabetes OR (2) Family history of glaucoma OR (3)  aged 50 and older OR (4)  American aged 65 and older  Osteoporosis Screening: covered every 2 years if you meet one of the following conditions: (1) You're estrogen deficient and at risk for osteoporosis based off medical history and other findings; (2) Have a vertebral abnormality; (3) On glucocorticoid therapy for more than 3 months; (4) Have primary hyperparathyroidism; (5) On osteoporosis medications and need to assess response to drug therapy.   Last bone density test (DXA Scan): 02/20/2017.  HIV Screening: covered annually if you're between the age of 15-65. Also covered annually if you are younger than 15 and older than 65 with risk factors for HIV infection. For pregnant patients, it is covered up to 3 times per  pregnancy.    Immunizations:  Immunization Recommendations   Influenza Vaccine Annual influenza vaccination during flu season is recommended for all persons aged >= 6 months who do not have contraindications   Pneumococcal Vaccine   * Pneumococcal conjugate vaccine = PCV13 (Prevnar 13), PCV15 (Vaxneuvance), PCV20 (Prevnar 20)  * Pneumococcal polysaccharide vaccine = PPSV23 (Pneumovax) Adults 19-63 yo with certain risk factors or if 65+ yo  If never received any pneumonia vaccine: recommend Prevnar 20 (PCV20)  Give PCV20 if previously received 1 dose of PCV13 or PPSV23   Hepatitis B Vaccine 3 dose series if at intermediate or high risk (ex: diabetes, end stage renal disease, liver disease)   Respiratory syncytial virus (RSV) Vaccine - COVERED BY MEDICARE PART D  * RSVPreF3 (Arexvy) CDC recommends that adults 60 years of age and older may receive a single dose of RSV vaccine using shared clinical decision-making (SCDM)   Tetanus (Td) Vaccine - COST NOT COVERED BY MEDICARE PART B Following completion of primary series, a booster dose should be given every 10 years to maintain immunity against tetanus. Td may also be given as tetanus wound prophylaxis.   Tdap Vaccine - COST NOT COVERED BY MEDICARE PART B Recommended at least once for all adults. For pregnant patients, recommended with each pregnancy.   Shingles Vaccine (Shingrix) - COST NOT COVERED BY MEDICARE PART B  2 shot series recommended in those 19 years and older who have or will have weakened immune systems or those 50 years and older     Health Maintenance Due:      Topic Date Due   • DXA SCAN  02/20/2020     Immunizations Due:      Topic Date Due   • COVID-19 Vaccine (3 - 2023-24 season) 09/01/2023     Advance Directives   What are advance directives?  Advance directives are legal documents that state your wishes and plans for medical care. These plans are made ahead of time in case you lose your ability to make decisions for yourself. Advance directives can  apply to any medical decision, such as the treatments you want, and if you want to donate organs.   What are the types of advance directives?  There are many types of advance directives, and each state has rules about how to use them. You may choose a combination of any of the following:  Living will:  This is a written record of the treatment you want. You can also choose which treatments you do not want, which to limit, and which to stop at a certain time. This includes surgery, medicine, IV fluid, and tube feedings.   Durable power of  for healthcare (DPAHC):  This is a written record that states who you want to make healthcare choices for you when you are unable to make them for yourself. This person, called a proxy, is usually a family member or a friend. You may choose more than 1 proxy.  Do not resuscitate (DNR) order:  A DNR order is used in case your heart stops beating or you stop breathing. It is a request not to have certain forms of treatment, such as CPR. A DNR order may be included in other types of advance directives.  Medical directive:  This covers the care that you want if you are in a coma, near death, or unable to make decisions for yourself. You can list the treatments you want for each condition. Treatment may include pain medicine, surgery, blood transfusions, dialysis, IV or tube feedings, and a ventilator (breathing machine).  Values history:  This document has questions about your views, beliefs, and how you feel and think about life. This information can help others choose the care that you would choose.  Why are advance directives important?  An advance directive helps you control your care. Although spoken wishes may be used, it is better to have your wishes written down. Spoken wishes can be misunderstood, or not followed. Treatments may be given even if you do not want them. An advance directive may make it easier for your family to make difficult choices about your care.    Fall Prevention    Fall prevention  includes ways to make your home and other areas safer. It also includes ways you can move more carefully to prevent a fall. Health conditions that cause changes in your blood pressure, vision, or muscle strength and coordination may increase your risk for falls. Medicines may also increase your risk for falls if they make you dizzy, weak, or sleepy.   Fall prevention tips:   Stand or sit up slowly.    Use assistive devices as directed.    Wear shoes that fit well and have soles that .    Wear a personal alarm.    Stay active.    Manage your medical conditions.    Home Safety Tips:  Add items to prevent falls in the bathroom.    Keep paths clear.    Install bright lights in your home.    Keep items you use often on shelves within reach.    Paint or place reflective tape on the edges of your stairs.    Weight Management   Why it is important to manage your weight:  Being overweight increases your risk of health conditions such as heart disease, high blood pressure, type 2 diabetes, and certain types of cancer. It can also increase your risk for osteoarthritis, sleep apnea, and other respiratory problems. Aim for a slow, steady weight loss. Even a small amount of weight loss can lower your risk of health problems.  How to lose weight safely:  A safe and healthy way to lose weight is to eat fewer calories and get regular exercise. You can lose up about 1 pound a week by decreasing the number of calories you eat by 500 calories each day.   Healthy meal plan for weight management:  A healthy meal plan includes a variety of foods, contains fewer calories, and helps you stay healthy. A healthy meal plan includes the following:  Eat whole-grain foods more often.  A healthy meal plan should contain fiber. Fiber is the part of grains, fruits, and vegetables that is not broken down by your body. Whole-grain foods are healthy and provide extra fiber in your diet. Some examples of  whole-grain foods are whole-wheat breads and pastas, oatmeal, brown rice, and bulgur.  Eat a variety of vegetables every day.  Include dark, leafy greens such as spinach, kale, deny greens, and mustard greens. Eat yellow and orange vegetables such as carrots, sweet potatoes, and winter squash.   Eat a variety of fruits every day.  Choose fresh or canned fruit (canned in its own juice or light syrup) instead of juice. Fruit juice has very little or no fiber.  Eat low-fat dairy foods.  Drink fat-free (skim) milk or 1% milk. Eat fat-free yogurt and low-fat cottage cheese. Try low-fat cheeses such as mozzarella and other reduced-fat cheeses.  Choose meat and other protein foods that are low in fat.  Choose beans or other legumes such as split peas or lentils. Choose fish, skinless poultry (chicken or turkey), or lean cuts of red meat (beef or pork). Before you cook meat or poultry, cut off any visible fat.   Use less fat and oil.  Try baking foods instead of frying them. Add less fat, such as margarine, sour cream, regular salad dressing and mayonnaise to foods. Eat fewer high-fat foods. Some examples of high-fat foods include french fries, doughnuts, ice cream, and cakes.  Eat fewer sweets.  Limit foods and drinks that are high in sugar. This includes candy, cookies, regular soda, and sweetened drinks.  Exercise:  Exercise at least 30 minutes per day on most days of the week. Some examples of exercise include walking, biking, dancing, and swimming. You can also fit in more physical activity by taking the stairs instead of the elevator or parking farther away from stores. Ask your healthcare provider about the best exercise plan for you.      © Copyright [employer] 2018 Information is for End User's use only and may not be sold, redistributed or otherwise used for commercial purposes. All illustrations and images included in CareNotes® are the copyrighted property of [employer] or [employer]

## 2024-05-02 NOTE — PROGRESS NOTES
Assessment and Plan:     Problem List Items Addressed This Visit    None     Labs after 6/21  Preventive health issues were discussed with patient, and age appropriate screening tests were ordered as noted in patient's After Visit Summary.  Personalized health advice and appropriate referrals for health education or preventive services given if needed, as noted in patient's After Visit Summary.     History of Present Illness:     Patient presents for a Medicare Wellness Visit.  Her blood pressure, cholesterol, hypothyroidism and hyperparathyroidism are all stable at this time.    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, or chronic kidney disease (HCC)    Hypercholesterolemia    Hypothyroidism    Lumbar radiculopathy    Obesity (BMI 30.0-34.9)    Osteopenia    PAD (peripheral artery disease) (HCC)    Tobacco abuse    Unspecified atrial fibrillation (HCC)    Stage 3a chronic kidney disease (HCC)    Secondary hyperparathyroidism of renal origin (HCC)    Vitamin D deficiency    Other specified diabetes mellitus with diabetic peripheral angiopathy without gangrene (HCC)    Hypertension    Continuous opioid dependence (HCC)    Acute on chronic diastolic (congestive) heart failure (HCC)    Gastrointestinal hemorrhage with melena      Past Medical and Surgical History:     Past Medical  History:   Diagnosis Date    Coronary artery disease     Hyperlipidemia     Hypertension     Peripheral vascular disease (HCC)      Past Surgical History:   Procedure Laterality Date    CARDIAC SURGERY      HYSTERECTOMY      age 31    OOPHORECTOMY Right     age 31    SMALL INTESTINE SURGERY      6 inches    TONSILLECTOMY      US GUIDED BREAST BIOPSY RIGHT COMPLETE Right 2019      Family History:     Family History   Problem Relation Age of Onset    Hypertension Father         essential     Heart disease Father     Kidney disease Mother     Diabetes Mother       Social History:     Social History     Socioeconomic History    Marital status:      Spouse name: Not on file    Number of children: 4    Years of education: less than high school    Highest education level: Not on file   Occupational History    Not on file   Tobacco Use    Smoking status: Former     Current packs/day: 0.00     Types: Cigarettes     Quit date: 3/1/2019     Years since quittin.1     Passive exposure: Current    Smokeless tobacco: Never   Substance and Sexual Activity    Alcohol use: No    Drug use: Never    Sexual activity: Not Currently   Other Topics Concern    Not on file   Social History Narrative    Daily coffee consumption:    Daily cola consumption:     Tea    Water intake, adequate (per day)     Social Determinants of Health     Financial Resource Strain: Medium Risk (2023)    Received from [facility]    Overall Financial Resource Strain (CARDIA)     Difficulty of Paying Living Expenses: Somewhat hard   Food Insecurity: No Food Insecurity (2023)    Received from [facility]    Hunger Vital Sign     Worried About Running Out of Food in the Last Year: Never true     Ran Out of Food in the Last Year: Never true   Transportation Needs: No Transportation Needs (2023)    Received from [facility]    PRAPARE - Transportation     Lack of Transportation  (Medical): No     Lack of Transportation (Non-Medical): No   Physical Activity: Not on file   Stress: Not on file   Social Connections: Not on file   Intimate Partner Violence: Not At Risk (12/28/2023)    Received from [facility]    Humiliation, Afraid, Rape, and Kick questionnaire     Fear of Current or Ex-Partner: No     Emotionally Abused: No     Physically Abused: No     Sexually Abused: No   Housing Stability: Low Risk  (12/28/2023)    Received from [facility]    Housing Stability Vital Sign     Unable to Pay for Housing in the Last Year: No     Number of Places Lived in the Last Year: 1     Unstable Housing in the Last Year: No      Medications and Allergies:     Current Outpatient Medications   Medication Sig Dispense Refill    HYDROcodone-acetaminophen (NORCO) 5-325 mg per tablet Take 1 tablet by mouth every 6 (six) hours as needed for pain Max Daily Amount: 4 tablets (Patient not taking: Reported on 2/2/2024) 60 tablet 0    acetaminophen (TYLENOL) 500 mg tablet Take 500 mg by mouth every 6 (six) hours      albuterol (2.5 mg/3 mL) 0.083 % nebulizer solution Take 3 mL (2.5 mg total) by nebulization every 6 (six) hours as needed for wheezing or shortness of breath 300 mL 3    albuterol (PROVENTIL HFA,VENTOLIN HFA) 90 mcg/act inhaler Inhale 2 puffs every 6 (six) hours as needed for wheezing or shortness of breath 8 g 3    ALPRAZolam (XANAX) 0.25 mg tablet Take 1 tablet (0.25 mg total) by mouth 2 (two) times a day as needed for anxiety 180 tablet 0    aspirin (ECOTRIN LOW STRENGTH) 81 mg EC tablet Take by mouth      calcitriol (ROCALTROL) 0.25 mcg capsule Take 1 capsule (0.25 mcg total) by mouth daily 90 capsule 3    candesartan (ATACAND) 16 mg tablet Take 1 tablet (16 mg total) by mouth daily at bedtime 30 tablet 3    carvedilol (COREG) 12.5 mg tablet Take 1 tablet (12.5 mg total) by mouth 2 (two) times a day with meals 180 tablet 3    cholecalciferol (VITAMIN D3) 1,000 units  tablet Take 1,000 Units by mouth daily      clopidogrel (PLAVIX) 75 mg tablet Take 75 mg by mouth daily   0    Diclofenac Sodium (VOLTAREN) 1 % Apply 2 g topically 4 (four) times a day (Patient not taking: Reported on 2/2/2024) 100 g 3    escitalopram (LEXAPRO) 10 mg tablet Take 1 tablet (10 mg total) by mouth daily 30 tablet 5    ferrous sulfate 325 (65 FE) MG EC tablet Take 1 tablet (325 mg total) by mouth daily with breakfast 30 tablet 3    fluticasone (FLONASE) 50 mcg/act nasal spray 1 spray into each nostril daily 16 g 0    gabapentin (NEURONTIN) 100 mg capsule Take 100 mg by mouth 2 (two) times a day      guaiFENesin (MUCINEX) 600 mg 12 hr tablet Take 600 mg by mouth 2 (two) times a day      levothyroxine 125 mcg tablet Take 1 tablet (125 mcg total) by mouth daily 30 tablet 5    pantoprazole (PROTONIX) 40 mg tablet Take 1 tablet (40 mg total) by mouth 2 (two) times a day 180 tablet 0    promethazine-dextromethorphan (PHENERGAN-DM) 6.25-15 mg/5 mL oral syrup Take 5 mL by mouth 4 (four) times a day as needed for cough 180 mL 0    rosuvastatin (CRESTOR) 40 MG tablet Take 40 mg by mouth daily      senna (SENOKOT) 8.6 MG tablet Take 1 tablet by mouth daily      tiotropium (Spiriva HandiHaler) 18 mcg inhalation capsule Place 1 capsule (18 mcg total) into inhaler and inhale daily 30 capsule 0    torsemide (DEMADEX) 20 mg tablet Take 1 tablet (20 mg total) by mouth daily 90 tablet 1    varenicline (CHANTIX) 1 mg tablet Take 1 tablet (1 mg total) by mouth 2 (two) times a day (Patient not taking: Reported on 2/2/2024) 60 tablet 3    zolpidem (AMBIEN CR) 6.25 MG CR tablet Take 1 tablet (6.25 mg total) by mouth daily at bedtime as needed for sleep 30 tablet 0    zolpidem (AMBIEN CR) 6.25 MG CR tablet Take 1 tablet (6.25 mg total) by mouth daily at bedtime as needed for sleep 30 tablet 0    zolpidem (AMBIEN) 10 mg tablet Take 1 tablet (10 mg total) by mouth daily at bedtime as needed for sleep 30 tablet 0     No current  facility-administered medications for this visit.     No Known Allergies   Immunizations:     Immunization History   Administered Date(s) Administered    COVID-19 J&J (VisConPro) vaccine 0.5 mL 03/12/2021, 12/28/2021    INFLUENZA 09/29/2018    Influenza Split High Dose Preservative Free IM 09/20/2011, 10/22/2013, 10/07/2014, 11/10/2015, 11/21/2016, 10/20/2017    Influenza, high dose seasonal 0.7 mL 09/25/2019, 10/07/2020, 11/15/2021, 09/28/2022    Influenza, seasonal, injectable 10/24/2008, 10/14/2009, 11/04/2010    Pneumococcal Conjugate 13-Valent 11/10/2015    Pneumococcal Polysaccharide PPV23 11/23/2007, 10/23/2014    Tdap 11/04/2010      Health Maintenance:         Topic Date Due    DXA SCAN  02/20/2020         Topic Date Due    COVID-19 Vaccine (3 - 2023-24 season) 09/01/2023      Medicare Screening Tests and Risk Assessments:     Kassandra is here for her Subsequent Wellness visit. Last Medicare Wellness visit information reviewed, patient interviewed, no change since last AWV.     Health Risk Assessment:   Patient rates overall health as fair. Patient feels that their physical health rating is same. Patient is satisfied with their life. Eyesight was rated as same. Hearing was rated as same. Patient feels that their emotional and mental health rating is same. Patients states they are sometimes angry. Patient states they are often unusually tired/fatigued. Pain experienced in the last 7 days has been a lot. Patient's pain rating has been 8/10. Patient states that she has experienced weight loss or gain in last 6 months.     Depression Screening:   PHQ-2 Score: 0      Fall Risk Screening:   In the past year, patient has experienced: history of falling in past year    Number of falls: 1  Injured during fall?: No    Feels unsteady when standing or walking?: No    Worried about falling?: Yes      Urinary Incontinence Screening:   Patient has not leaked urine accidently in the last six months.     Home Safety:  Patient  does not have trouble with stairs inside or outside of their home. Patient has working smoke alarms and has no working carbon monoxide detector. Home safety hazards include: none.     Nutrition:   Current diet is No Added Salt and Regular.     Medications:   Patient is currently taking over-the-counter supplements. OTC medications include: see medication list. Patient is able to manage medications.     Activities of Daily Living (ADLs)/Instrumental Activities of Daily Living (IADLs):   Walk and transfer into and out of bed and chair?: Yes  Dress and groom yourself?: Yes    Bathe or shower yourself?: Yes    Feed yourself? Yes  Do your laundry/housekeeping?: Yes  Manage your money, pay your bills and track your expenses?: Yes  Make your own meals?: Yes    Do your own shopping?: Yes    Previous Hospitalizations:   Any hospitalizations or ED visits within the last 12 months?: Yes    How many hospitalizations have you had in the last year?: 1-2    Advance Care Planning:   Living will: Yes    Durable POA for healthcare: Yes    Advanced directive: Yes    Advanced directive counseling given: Yes    ACP document given: Yes    Patient declined ACP directive: No    End of Life Decisions reviewed with patient: Yes    Provider agrees with end of life decisions: Yes      Cognitive Screening:   Provider or family/friend/caregiver concerned regarding cognition?: No    PREVENTIVE SCREENINGS      Cardiovascular Screening:    General: Screening Not Indicated and History Lipid Disorder      Diabetes Screening:     General: Screening Not Indicated and History Diabetes      Colorectal Cancer Screening:     General: Screening Current      Cervical Cancer Screening:    General: Screening Not Indicated      Osteoporosis Screening:    General: Risks and Benefits Discussed    Due for: DXA Axial      Abdominal Aortic Aneurysm (AAA) Screening:        General: Screening Current and Screening Not Indicated      Lung Cancer Screening:     General:  Screening Not Indicated      Hepatitis C Screening:    General: Screening Current    Screening, Brief Intervention, and Referral to Treatment (SBIRT)    Screening  Typical number of drinks in a day: 0  Typical number of drinks in a week: 0  Interpretation: Low risk drinking behavior.    Single Item Drug Screening:  How often have you used an illegal drug (including marijuana) or a prescription medication for non-medical reasons in the past year? never    Single Item Drug Screen Score: 0  Interpretation: Negative screen for possible drug use disorder    No results found.     Physical Exam:     There were no vitals taken for this visit.    Physical Exam     Orly Simpson, DO

## 2024-05-06 DIAGNOSIS — G47.00 INSOMNIA, UNSPECIFIED TYPE: ICD-10-CM

## 2024-05-06 DIAGNOSIS — J44.9 CHRONIC OBSTRUCTIVE PULMONARY DISEASE, UNSPECIFIED COPD TYPE (HCC): ICD-10-CM

## 2024-05-06 RX ORDER — ZOLPIDEM TARTRATE 10 MG/1
10 TABLET ORAL
Qty: 30 TABLET | Refills: 1 | Status: SHIPPED | OUTPATIENT
Start: 2024-05-06

## 2024-05-06 NOTE — TELEPHONE ENCOUNTER
1 [id number] 04/04/2024 04/04/2024 Zolpidem Tartrate (Tablet) 30.0 30 10 MG NA SALONI BROADT [facility] [place] 0 / 0 [place]    1 [id number] 02/27/2024 02/27/2024 Zolpidem Tartrate (Tablet, Extended Release) 30.0 30 6.25 MG NA SALONI BROADT [facility] Medicare 0 / 0 [place]    1 [id number] 01/25/2024 01/25/2024 Zolpidem Tartrate (Tablet, Extended Release) 30.0 30 6.25 MG NA SALONI BROADT [facility] [place] 0 / 0 [place]    2 [id number] 01/07/2024 01/07/2024 Zolpidem Tartrate (Tablet) 2.0 2 5 MG NA ELIZABETH RUBY [employer]MNICARE [facility] Private Pay 0 / 0 [place]    2 [id number] 01/06/2024 01/06/2024 ALPRAZolam (Tablet) 20.0 2 0.25 MG NA LARISA GREENWOOD [employer]MN[employer]ARE [facility] Private Pay 0 / 0 [place]    2 [id number] 01/06/2024 01/06/2024 Zolpidem Tartrate (Tablet, Extended Release) 10.0 10 6.25 MG NA LARISAARIN GREENWOOD [employer]MNICARE [facility] Private Pay 0 / 0 [place]    1 [id number] 12/13/2023 12/13/2023 Zolpidem Tartrate (Tablet, Extended Release) 30.0 30 6.25 MG NA HILTON DIOGO [facility] Medicare 0 / 0 [place]    1 [id number] 12/05/2023 12/05/2023 ALPRAZolam (Tablet) 180.0 90 0.25 MG NA SALONI BROADT [facility] Medicare 0 / 0 [place]    1 [id number] 11/10/2023 11/10/2023 Zolpidem Tartrate (Tablet, Extended Release) 30.0 30 6.25 MG NA HILTON DIOGO [facility] Commercial Insurance 0 / 0 [place]    1 [id number] 10/11/2023 10/10/2023 Zolpidem Tartrate (Tablet, Extended Release) 30.0 30 6.25 MG NA [facility] / 0 P

## 2024-05-07 RX ORDER — TIOTROPIUM BROMIDE 18 UG/1
18 CAPSULE ORAL; RESPIRATORY (INHALATION) DAILY
Qty: 30 CAPSULE | Refills: 5 | Status: SHIPPED | OUTPATIENT
Start: 2024-05-07

## 2024-06-03 DIAGNOSIS — J44.9 CHRONIC OBSTRUCTIVE PULMONARY DISEASE, UNSPECIFIED COPD TYPE (HCC): ICD-10-CM

## 2024-06-03 DIAGNOSIS — I10 ESSENTIAL HYPERTENSION: ICD-10-CM

## 2024-06-03 NOTE — TELEPHONE ENCOUNTER
Reason for call:   [x] Refill   [] Prior Auth  [] Other:     Office:   [x] PCP/Provider -Orly Simpson/Bebe JOSHUA   [] Specialty/Provider -     Medication: Atacand    Dose/Frequency: 16 mg tablet    Quantity: #30    Pharmacy: [facility] [address]    Does the patient have enough for 3 days?   [x] Yes   [] No - Send as HP to POD                    Reason for call:   [x] Refill   [] Prior Auth  [] Other:     Office:   [x] PCP/Provider - Orly Simpson/Bebe    [] Specialty/Provider -     Medication: Spiriva    Dose/Frequency: 18 mcg inhalation capsule    Quantity: #30    Pharmacy: [facility] [address]    Does the patient have enough for 3 days?   [x] Yes   [] No - Send as HP to POD

## 2024-06-04 RX ORDER — TIOTROPIUM BROMIDE 18 UG/1
18 CAPSULE ORAL; RESPIRATORY (INHALATION) DAILY
Qty: 30 CAPSULE | Refills: 5 | Status: SHIPPED | OUTPATIENT
Start: 2024-06-04

## 2024-06-04 RX ORDER — CANDESARTAN 16 MG/1
16 TABLET ORAL
Qty: 30 TABLET | Refills: 5 | Status: SHIPPED | OUTPATIENT
Start: 2024-06-04

## 2024-06-07 DIAGNOSIS — G47.00 INSOMNIA, UNSPECIFIED TYPE: ICD-10-CM

## 2024-06-07 RX ORDER — ZOLPIDEM TARTRATE 6.25 MG/1
6.25 TABLET, FILM COATED, EXTENDED RELEASE ORAL
Qty: 30 TABLET | Refills: 0 | Status: SHIPPED | OUTPATIENT
Start: 2024-06-07 | End: 2024-07-07

## 2024-06-07 NOTE — TELEPHONE ENCOUNTER
1 [id number] 05/07/2024 04/03/2024 Zolpidem Tartrate (Tablet, Extended Release) 30.0 30 6.25 MG NA SALONI BROADT [facility] Medicare 0 / 0 [place]    1 [id number] 04/04/2024 04/04/2024 Zolpidem Tartrate (Tablet) 30.0 30 10 MG NA SALONI BROADT [facility] Medicare 0 / 0 [place]    1 [id number] 02/27/2024 02/27/2024 Zolpidem Tartrate (Tablet, Extended Release) 30.0 30 6.25 MG NA SALONI BROADT [facility] Medicare 0 / 0 [place]

## 2024-06-18 ENCOUNTER — TELEPHONE (OUTPATIENT)
Dept: LAB | Facility: HOSPITAL | Age: 84
End: 2024-06-18

## 2024-06-18 ENCOUNTER — TELEPHONE (OUTPATIENT)
Age: 84
End: 2024-06-18

## 2024-06-18 NOTE — TELEPHONE ENCOUNTER
The patient called she needs to have her bloodwork done in June   she gets it done at home so she needs to have the nurses who do home draws for labs set up   please call the patient when it is set  up   thank you

## 2024-06-21 LAB
ALBUMIN SERPL-MCNC: 4.1 G/DL (ref 3.5–5.7)
ALP SERPL-CCNC: 77 U/L (ref 35–120)
ALT SERPL-CCNC: 15 U/L
ANION GAP SERPL CALCULATED.3IONS-SCNC: 14 MMOL/L (ref 3–11)
AST SERPL-CCNC: 20 U/L
BILIRUB SERPL-MCNC: 0.4 MG/DL (ref 0.2–1)
BUN SERPL-MCNC: 23 MG/DL (ref 7–25)
CALCIUM SERPL-MCNC: 9.5 MG/DL (ref 8.5–10.1)
CHLORIDE SERPL-SCNC: 100 MMOL/L (ref 100–109)
CHOLEST SERPL-MCNC: 192 MG/DL
CHOLEST/HDLC SERPL: 3.1 {RATIO}
CO2 SERPL-SCNC: 28 MMOL/L (ref 21–31)
CREAT SERPL-MCNC: 1.01 MG/DL (ref 0.4–1.1)
CYTOLOGY CMNT CVX/VAG CYTO-IMP: ABNORMAL
GFR/BSA.PRED SERPLBLD CYS-BASED-ARV: 55 ML/MIN/{1.73_M2}
GLUCOSE SERPL-MCNC: 90 MG/DL (ref 65–99)
HDLC SERPL-MCNC: 62 MG/DL (ref 23–92)
LDLC SERPL CALC-MCNC: 70 MG/DL
NONHDLC SERPL-MCNC: 130 MG/DL
POTASSIUM SERPL-SCNC: 4.2 MMOL/L (ref 3.5–5.2)
PROT SERPL-MCNC: 7 G/DL (ref 6.3–8.3)
SODIUM SERPL-SCNC: 142 MMOL/L (ref 135–145)
TRIGL SERPL-MCNC: 299 MG/DL
TSH SERPL-ACNC: 1.07 UIU/ML (ref 0.45–5.33)

## 2024-07-08 DIAGNOSIS — G47.00 INSOMNIA, UNSPECIFIED TYPE: ICD-10-CM

## 2024-07-08 RX ORDER — ZOLPIDEM TARTRATE 6.25 MG/1
6.25 TABLET, FILM COATED, EXTENDED RELEASE ORAL
Qty: 30 TABLET | Refills: 0 | Status: SHIPPED | OUTPATIENT
Start: 2024-07-08 | End: 2024-08-07

## 2024-07-08 NOTE — TELEPHONE ENCOUNTER
Reason for call:   [x] Refill   [] Prior Auth  [] Other:     Office:   [x] PCP/Provider -   [] Specialty/Provider -     Medication: zolpidem (AMBIEN CR) 6.25 MG CR tablet     Dose/Frequency: Take 1 tablet (6.25 mg total) by mouth daily at bedtime as needed for sleep     Quantity: 30    Pharm[facility]:  [id number] [place] [address]     Does the patient have enough for 3 days?   [] Yes   [x] No - Send as HP to POD

## 2024-07-08 NOTE — TELEPHONE ENCOUNTER
1 [id number] 06/07/2024 06/07/2024 Zolpidem Tartrate (Tablet, Extended Release) 30.0 30 6.25 MG NA HILTON DIOGO [facility] [place] 0 / 0 [place]    1 [id number] 05/07/2024 04/03/2024 Zolpidem Tartrate (Tablet, Extended Release) 30.0 30 6.25 MG NA SALONI BROADT [facility] Medicare 0 / 0 [place]    1 [id number] 04/04/2024 04/04/2024 Zolpidem Tartrate (Tablet) 30.0 30 10 MG NA SALONI BROADT [facility] Medicare 0 / 0 [place]    1 [id number] 02/27/2024 02/27/2024 Zolpidem Tartrate (Tablet, Extended Release) 30.0 30 6.25 MG NA SALONI BROADT [facility] Medicare 0 / 0 [place]    1 [id number] 01/25/2024 01/25/2024 Zolpidem Tartrate (Tablet, Extended Release) 30.0 30 6.25 MG NA SALONI BROADT [facility] Medicare 0 / 0 [place]    2 [id number] 01/07/2024 01/07/2024 Zolpidem Tartrate (Tablet) 2.0 2 5 MG NA ELIZABETH RUBY [employer]MNICARE OF [place] Private Pay 0 / 0 [place]    2 [id number] 01/06/2024 01/06/2024 ALPRAZolam (Tablet) 20.0 2 0.25 MG NA LARISA GREENWOOD [employer]MN[facility] OF [place] Private Pay 0 / 0 [place]    2 [id number] 01/06/2024 01/06/2024 Zolpidem Tartrate (Tablet, Extended Release) 10.0 10 6.25 MG NA LARISA GREENWOOD [employer] [facility] [place] Private Pay 0 / 0 [place]    1 [id number] 12/13/2023 12/13/2023 Zolpidem Tartrate (Tablet, Extended Release) 30.0 30 6.25 MG NA HILTON DIOGO [facility] Medicare 0 / 0 [place]    1 [id number] 12/05/2023 12/05/2023 ALPRAZolam (Tablet) 180.0 90 0.25 MG NA SALONI BROADT [facility]  [place] [place] 0 / 0 [place]

## 2024-07-30 DIAGNOSIS — I50.33 ACUTE ON CHRONIC DIASTOLIC (CONGESTIVE) HEART FAILURE (HCC): Primary | ICD-10-CM

## 2024-07-30 DIAGNOSIS — K21.9 GASTROESOPHAGEAL REFLUX DISEASE WITHOUT ESOPHAGITIS: ICD-10-CM

## 2024-07-30 RX ORDER — PANTOPRAZOLE SODIUM 40 MG/1
40 TABLET, DELAYED RELEASE ORAL 2 TIMES DAILY
Qty: 200 TABLET | Refills: 1 | Status: SHIPPED | OUTPATIENT
Start: 2024-07-30

## 2024-07-30 RX ORDER — ASPIRIN 81 MG/1
81 TABLET ORAL DAILY
Qty: 100 TABLET | Refills: 1 | Status: SHIPPED | OUTPATIENT
Start: 2024-07-30

## 2024-07-30 NOTE — TELEPHONE ENCOUNTER
Reason for call:   [x] Refill   [] Prior Auth  [] Other:     Office:   [x] PCP/Provider - Orly Simpson DO   [] Specialty/Provider -         Pharmacy: [id number] - [place] - [address]     Does the patient have enough for 3 days?   [x] Yes   [] No - Send as HP to POD

## 2024-08-08 DIAGNOSIS — G47.00 INSOMNIA, UNSPECIFIED TYPE: ICD-10-CM

## 2024-08-08 DIAGNOSIS — F41.9 ANXIETY: ICD-10-CM

## 2024-08-08 RX ORDER — ZOLPIDEM TARTRATE 6.25 MG/1
6.25 TABLET, FILM COATED, EXTENDED RELEASE ORAL
Qty: 30 TABLET | Refills: 0 | Status: SHIPPED | OUTPATIENT
Start: 2024-08-08 | End: 2024-09-07

## 2024-08-08 RX ORDER — ALPRAZOLAM 0.25 MG/1
0.25 TABLET ORAL 2 TIMES DAILY PRN
Qty: 180 TABLET | Refills: 0 | Status: SHIPPED | OUTPATIENT
Start: 2024-08-08

## 2024-08-08 NOTE — TELEPHONE ENCOUNTER
1 [id number] 07/08/2024 07/08/2024 Zolpidem Tartrate (Tablet, Extended Release) 30.0 30 6.25 MG NA HILTON DIOGO [facility] Medicare 0 / 0 [place]    1 [id number] 06/07/2024 06/07/2024 Zolpidem Tartrate (Tablet, Extended Release) 30.0 30 6.25 MG NA HILTON DIOGO [facility] Medicare 0 / 0 [place]    1 [id number] 05/07/2024 04/03/2024 Zolpidem Tartrate (Tablet, Extended Release) 30.0 30 6.25 MG NA SALONI BROADT [facility] Medicare 0 / 0 [place]    1 [id number] 04/04/2024 04/04/2024 Zolpidem Tartrate (Tablet) 30.0 30 10 MG NA SALONI BROADT [facility] Medicare 0 / 0 [place]    1 [id number] 02/27/2024 02/27/2024 Zolpidem Tartrate (Tablet, Extended Release) 30.0 30 6.25 MG NA SALONI BROADT [facility] Medicare 0 / 0 [place]    1 [id number] 01/25/2024 01/25/2024 Zolpidem Tartrate (Tablet, Extended Release) 30.0 30 6.25 MG NA SALONI BROADT [facility] Medicare 0 / 0 [place]    2 [id number] 01/07/2024 01/07/2024 Zolpidem Tartrate (Tablet) 2.0 2 5 MG NA ELIZABETH RUBY [facility] [place] Private Pay 0 / 0 [place]    2 [id number] 01/06/2024 01/06/2024 ALPRAZolam (Tablet) 20.0 2 0.25 MG NA LARISA GREENWOOD [employer] [facility] [place] Private Pay 0 / 0 [place]    2 [id number] 01/06/2024 01/06/2024 Zolpidem Tartrate (Tablet, Extended Release) 10.0 10 6.25 MG NA LARISA GREENWOOD [employer] [facility] [place] Private Pay 0 / 0 [place]    1 [id number] 12/13/2023 12/13/2023 Zolpidem Tartrate (Tablet, Extended Release) 30.0 30 6.25 MG NA HILTON DIOGO [facility] Medicare 0 / 0 [place]

## 2024-08-08 NOTE — TELEPHONE ENCOUNTER
Reason for call:   [x] Refill   [] Prior Auth  [] Other:     Office:   [x] PCP/Provider - Orly Simpson/Bebe JOSHUA   [] Specialty/Provider -     Pharmacy: [facility] [address]    Does the patient have enough for 3 days?   [] Yes   [x] No - Send as HP to POD

## 2024-08-19 ENCOUNTER — RA CDI HCC (OUTPATIENT)
Dept: OTHER | Facility: HOSPITAL | Age: 84
End: 2024-08-19

## 2024-08-19 NOTE — PROGRESS NOTES
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

## 2024-09-03 DIAGNOSIS — I10 ESSENTIAL HYPERTENSION: ICD-10-CM

## 2024-09-03 DIAGNOSIS — F32.9 REACTIVE DEPRESSION: ICD-10-CM

## 2024-09-03 DIAGNOSIS — N18.31 CKD STAGE G3A/A1, GFR 45-59 AND ALBUMIN CREATININE RATIO <30 MG/G (HCC): ICD-10-CM

## 2024-09-03 DIAGNOSIS — N17.9 AKI (ACUTE KIDNEY INJURY) (HCC): ICD-10-CM

## 2024-09-03 NOTE — TELEPHONE ENCOUNTER
Patient is asking for Dr Simpson to Refill the Torsemide 20 mg now.      Reason for call:   [x] Refill   [] Prior Auth  [] Other:     Office:   [x] PCP/Provider - Orly Simpson, DO   [] Specialty/Provider -         Ph[facility]: [id number] - [place] - [address] [facility]     Does the patient have enough for 3 days?   [x] Yes   [] No - Send as HP to POD

## 2024-09-04 RX ORDER — TORSEMIDE 20 MG/1
20 TABLET ORAL DAILY
Qty: 90 TABLET | Refills: 1 | Status: SHIPPED | OUTPATIENT
Start: 2024-09-04 | End: 2025-03-03

## 2024-09-04 RX ORDER — ESCITALOPRAM OXALATE 10 MG/1
10 TABLET ORAL DAILY
Qty: 30 TABLET | Refills: 5 | Status: SHIPPED | OUTPATIENT
Start: 2024-09-04

## 2024-09-10 RX ORDER — LORATADINE 10 MG/1
TABLET ORAL
COMMUNITY
Start: 2024-08-01

## 2024-09-11 ENCOUNTER — OFFICE VISIT (OUTPATIENT)
Dept: FAMILY MEDICINE CLINIC | Facility: CLINIC | Age: 84
End: 2024-09-11

## 2024-09-11 VITALS
OXYGEN SATURATION: 96 % | HEART RATE: 62 BPM | SYSTOLIC BLOOD PRESSURE: 126 MMHG | BODY MASS INDEX: 33.32 KG/M2 | WEIGHT: 195.2 LBS | HEIGHT: 64 IN | TEMPERATURE: 97.5 F | DIASTOLIC BLOOD PRESSURE: 62 MMHG

## 2024-09-11 DIAGNOSIS — M54.16 LUMBAR RADICULOPATHY: ICD-10-CM

## 2024-09-11 DIAGNOSIS — E78.00 HYPERCHOLESTEROLEMIA: ICD-10-CM

## 2024-09-11 DIAGNOSIS — E03.9 HYPOTHYROIDISM, UNSPECIFIED TYPE: ICD-10-CM

## 2024-09-11 DIAGNOSIS — I10 ESSENTIAL HYPERTENSION: ICD-10-CM

## 2024-09-11 DIAGNOSIS — G47.00 INSOMNIA, UNSPECIFIED TYPE: ICD-10-CM

## 2024-09-11 DIAGNOSIS — I73.9 PAD (PERIPHERAL ARTERY DISEASE) (HCC): ICD-10-CM

## 2024-09-11 DIAGNOSIS — F11.20 CONTINUOUS OPIOID DEPENDENCE (HCC): ICD-10-CM

## 2024-09-11 DIAGNOSIS — E11.42 TYPE 2 DIABETES MELLITUS WITH DIABETIC POLYNEUROPATHY, WITHOUT LONG-TERM CURRENT USE OF INSULIN (HCC): Primary | ICD-10-CM

## 2024-09-11 DIAGNOSIS — Z23 ENCOUNTER FOR IMMUNIZATION: ICD-10-CM

## 2024-09-11 DIAGNOSIS — I48.91 ATRIAL FIBRILLATION, UNSPECIFIED TYPE (HCC): ICD-10-CM

## 2024-09-11 DIAGNOSIS — J44.9 CHRONIC OBSTRUCTIVE PULMONARY DISEASE, UNSPECIFIED COPD TYPE (HCC): ICD-10-CM

## 2024-09-11 DIAGNOSIS — I50.33 ACUTE ON CHRONIC DIASTOLIC (CONGESTIVE) HEART FAILURE (HCC): ICD-10-CM

## 2024-09-11 LAB — SL AMB POCT HEMOGLOBIN AIC: 6 (ref ?–6.5)

## 2024-09-11 RX ORDER — DEXTROMETHORPHAN HYDROBROMIDE AND PROMETHAZINE HYDROCHLORIDE 15; 6.25 MG/5ML; MG/5ML
5 SYRUP ORAL 4 TIMES DAILY PRN
Qty: 180 ML | Refills: 1 | Status: SHIPPED | OUTPATIENT
Start: 2024-09-11

## 2024-09-11 RX ORDER — HYDROCODONE BITARTRATE AND ACETAMINOPHEN 5; 325 MG/1; MG/1
1 TABLET ORAL EVERY 6 HOURS PRN
Qty: 60 TABLET | Refills: 0 | Status: SHIPPED | OUTPATIENT
Start: 2024-09-11

## 2024-09-11 RX ORDER — ZOLPIDEM TARTRATE 6.25 MG/1
6.25 TABLET, FILM COATED, EXTENDED RELEASE ORAL
Qty: 30 TABLET | Refills: 0 | Status: SHIPPED | OUTPATIENT
Start: 2024-09-11 | End: 2024-10-11

## 2024-09-13 ENCOUNTER — TELEPHONE (OUTPATIENT)
Dept: FAMILY MEDICINE CLINIC | Facility: CLINIC | Age: 84
End: 2024-09-13

## 2024-09-13 NOTE — PROGRESS NOTES
Patient ID: Kassandra Pacheco is a 84 y.o. female.    HPI: 84 y.o.female presents for follow up of niddm, hypertension,hypercholesterolemia, COPD, CHF, atrial fibrillation, PAD, lumbar radiculopathy and insomnia.  Hgba1c is  6.0 and patient's issues are well controlled. Patient deneis any dyspnea, chest pain or palpitations.      SUBJECTIVE    Family History   Problem Relation Age of Onset    Hypertension Father         essential     Heart disease Father     Kidney disease Mother     Diabetes Mother      Social History     Socioeconomic History    Marital status:      Spouse name: Not on file    Number of children: 4    Years of education: less than high school    Highest education level: Not on file   Occupational History    Not on file   Tobacco Use    Smoking status: Former     Current packs/day: 0.00     Types: Cigarettes     Quit date: 3/1/2019     Years since quittin.5     Passive exposure: Current    Smokeless tobacco: Never   Vaping Use    Vaping status: Former   Substance and Sexual Activity    Alcohol use: No    Drug use: Never    Sexual activity: Not Currently   Other Topics Concern    Not on file   Social History Narrative    Daily coffee consumption:    Daily cola consumption:     Tea    Water intake, adequate (per day)     Social Determinants of Health     Financial Resource Strain: Medium Risk (2023)    [facility]    Overall Financial Resource Strain (CARDIA)     Difficulty of Paying Living Expenses: Somewhat hard   Food Insecurity: No Food Insecurity (2024)    Hunger Vital Sign     Worried About Running Out of Food in the Last Year: Never true     Ran Out of Food in the Last Year: Never true   Transportation Needs: No Transportation Needs (2024)    PRAPARE - Transportation     Lack of Transportation (Medical): No     Lack of Transportation (Non-Medical): No   Physical Activity: Not on file   Stress: Not on file    Social Connections: Not on file   Intimate Partner Violence: Not At Risk (12/28/2023)    [facility]    Humiliation, Afraid, Rape, and Kick questionnaire     Fear of Current or Ex-Partner: No     Emotionally Abused: No     Physically Abused: No     Sexually Abused: No   Housing Stability: Low Risk  (5/2/2024)    Housing Stability Vital Sign     Unable to Pay for Housing in the Last Year: No     Number of Times Moved in the Last Year: 1     Homeless in the Last Year: No     Past Medical History:   Diagnosis Date    Coronary artery disease     Hyperlipidemia     Hypertension     Peripheral vascular disease (HCC)      Past Surgical History:   Procedure Laterality Date    CARDIAC SURGERY      HYSTERECTOMY      age 31    OOPHORECTOMY Right     age 31    SMALL INTESTINE SURGERY      6 inches    TONSILLECTOMY      US GUIDED BREAST BIOPSY RIGHT COMPLETE Right 4/24/2019     No Known Allergies    Current Outpatient Medications:     acetaminophen (TYLENOL) 500 mg tablet, Take 500 mg by mouth every 6 (six) hours, Disp: , Rfl:     albuterol (2.5 mg/3 mL) 0.083 % nebulizer solution, Take 3 mL (2.5 mg total) by nebulization every 6 (six) hours as needed for wheezing or shortness of breath, Disp: 300 mL, Rfl: 3    albuterol (PROVENTIL HFA,VENTOLIN HFA) 90 mcg/act inhaler, Inhale 2 puffs every 6 (six) hours as needed for wheezing or shortness of breath, Disp: 8 g, Rfl: 3    ALPRAZolam (XANAX) 0.25 mg tablet, Take 1 tablet (0.25 mg total) by mouth 2 (two) times a day as needed for anxiety, Disp: 180 tablet, Rfl: 0    aspirin (ECOTRIN LOW STRENGTH) 81 mg EC tablet, Take 1 tablet (81 mg total) by mouth daily, Disp: 100 tablet, Rfl: 1    calcitriol (ROCALTROL) 0.25 mcg capsule, Take 1 capsule (0.25 mcg total) by mouth daily, Disp: 90 capsule, Rfl: 3    candesartan (ATACAND) 16 mg tablet, Take 1 tablet (16 mg total) by mouth daily at bedtime, Disp: 30 tablet, Rfl: 5     carvedilol (COREG) 12.5 mg tablet, Take 1 tablet (12.5 mg total) by mouth 2 (two) times a day with meals, Disp: 180 tablet, Rfl: 3    cholecalciferol (VITAMIN D3) 1,000 units tablet, Take 1,000 Units by mouth daily, Disp: , Rfl:     clopidogrel (PLAVIX) 75 mg tablet, Take 75 mg by mouth daily , Disp: , Rfl: 0    escitalopram (LEXAPRO) 10 mg tablet, Take 1 tablet (10 mg total) by mouth daily, Disp: 30 tablet, Rfl: 5    ferrous sulfate 325 (65 FE) MG EC tablet, Take 1 tablet (325 mg total) by mouth daily with breakfast, Disp: 30 tablet, Rfl: 3    fluticasone (FLONASE) 50 mcg/act nasal spray, 1 spray into each nostril daily, Disp: 16 g, Rfl: 0    gabapentin (NEURONTIN) 300 mg capsule, Take 300 mg by mouth 2 (two) times a day, Disp: , Rfl:     guaiFENesin (MUCINEX) 600 mg 12 hr tablet, Take 600 mg by mouth 2 (two) times a day, Disp: , Rfl:     HYDROcodone-acetaminophen (NORCO) 5-325 mg per tablet, Take 1 tablet by mouth every 6 (six) hours as needed for pain Max Daily Amount: 4 tablets, Disp: 60 tablet, Rfl: 0    levothyroxine 125 mcg tablet, Take 1 tablet (125 mcg total) by mouth daily, Disp: 30 tablet, Rfl: 5    pantoprazole (PROTONIX) 40 mg tablet, Take 1 tablet (40 mg total) by mouth 2 (two) times a day, Disp: 200 tablet, Rfl: 1    Procto-Med HC 2.5 % rectal cream, insert 1 applicatorful rectally twice a day if needed, Disp: , Rfl:     promethazine-dextromethorphan (PHENERGAN-DM) 6.25-15 mg/5 mL oral syrup, Take 5 mL by mouth 4 (four) times a day as needed for cough, Disp: 180 mL, Rfl: 1    RA Arthritis Pain Relief 650 MG CR tablet, take 1 tablet by mouth every 8 hours if needed for MILD PAIN ( PAIN SCALE 1-3 ), Disp: , Rfl:     rosuvastatin (CRESTOR) 40 MG tablet, Take 40 mg by mouth daily, Disp: , Rfl:     senna (SENOKOT) 8.6 MG tablet, Take 1 tablet by mouth daily, Disp: , Rfl:     tiotropium (SPIRIVA) 18 mcg inhalation capsule, Place 1 capsule (18 mcg total) into inhaler and inhale daily In the Handhaler, Disp:  "30 capsule, Rfl: 5    torsemide (DEMADEX) 20 mg tablet, Take 1 tablet (20 mg total) by mouth daily, Disp: 90 tablet, Rfl: 1    zolpidem (AMBIEN CR) 6.25 MG CR tablet, Take 1 tablet (6.25 mg total) by mouth daily at bedtime as needed for sleep, Disp: 30 tablet, Rfl: 0    promethazine-dextromethorphan (PHENERGAN-DM) 6.25-15 mg/5 mL oral syrup, Take 5 mL by mouth 4 (four) times a day as needed for cough (Patient not taking: Reported on 9/11/2024), Disp: 180 mL, Rfl: 0    Review of Systems  Constitutional:     Denies fever, chills ,fatigue ,weakness ,weight loss, weight gain     ENT: Denies earache ,loss of hearing ,nosebleed, nasal discharge,nasal congestion ,sore throat ,hoarseness  Pulmonary: Denies shortness of breath ,cough  ,dyspnea on exertion, orthopnea  ,PND   Cardiovascular:  Denies bradycardia , tachycardia  ,palpations, lower extremity edema leg, claudication  Breast:  Denies new or changing breast lumps ,nipple discharge ,nipple changes  Abdomen:  Denies abdominal pain , anorexia , indigestion, nausea, vomiting, constipation, diarrhea  Musculoskeletal: Denies myalgias, arthralgias, joint swelling, joint stiffness , limb pain, limb swelling  Gu: denies dysuria, polyuria  Skin: Denies skin rash, skin lesion, skin wound, itching, dry skin  Neuro: Denies headache, numbness, tingling, confusion, loss of consciousness, dizziness, vertigo  Psychiatric: Denies feelings of depression, suicidal ideation, anxiety, sleep disturbances    OBJECTIVE  /62   Pulse 62   Temp 97.5 °F (36.4 °C)   Ht 5' 4\" (1.626 m)   Wt 88.5 kg (195 lb 3.2 oz)   SpO2 96%   BMI 33.51 kg/m²   Constitutional:   NAD, well appearing and well nourished      ENT:   Conjunctiva and lids: no injection, edema, or discharge     Pupils and iris: MILADY bilaterally    External inspection of ears and nose: normal without deformities or discharge.      Otoscopic exam: Canals patent without erythema.       Nasal mucosa, septum and turbinates: " Normal or edema or discharge         Oropharynx:  Moist mucosa, normal tongue and tonsils without lesions. No erythema        Pulmonary:Respiratory effort normal rate and rhythm, no increased work of breathing. Auscultation of lungs:  Clear bilaterally with no adventitious breath sounds       Cardiovascular: regular rate and rhythm, S1 and S2, no murmur, no edema and/or varicosities of LE      Abdomen: Soft and non-distended     Positive bowel sounds      No heptomegaly or splenomegaly      Gu: no suprapubic tenderness or CVA tenderness, no urethral discharge  Lymphatic:  No anterior or posterior cervical lymphadenopathy         Musculoskeletal:  Gait and station: Normal gait      Digits and nails normal without clubbing or cyanosis       Inspection/palpation of joints, bones, and muscles:  No joint tenderness, swelling, full active and passive range of motion       Skin: Normal skin turgor and no rashes      Neuro:    Normal reflexes     Psych:   alert and oriented to person, place and time     normal mood and affect   Patient's shoes and socks removed.    Right Foot/Ankle   Right Foot Inspection  Skin Exam: skin normal and skin intact. No dry skin, no warmth, no callus, no erythema, no maceration, no abnormal color, no pre-ulcer, no ulcer and no callus.     Toe Exam: ROM and strength within normal limits. No swelling, no tenderness, erythema and  no right toe deformity    Sensory   Vibration: diminished  Proprioception: diminished  Monofilament testing: diminished    Vascular  Capillary refills: elevated  The right DP pulse is 1+. The right PT pulse is 1+.     Left Foot/Ankle  Left Foot Inspection  Skin Exam: skin normal and skin intact. No dry skin, no warmth, no erythema, no maceration, normal color, no pre-ulcer, no ulcer and no callus.     Toe Exam: ROM and strength within normal limits. No swelling, no tenderness, no erythema and no left toe deformity.     Sensory   Vibration: diminished  Proprioception:  diminished  Monofilament testing: diminished    Vascular  Capillary refills: elevated  The left DP pulse is 1+. The left PT pulse is 1+.     Assign Risk Category  No deformity present  Loss of protective sensation  No weak pulses  Risk: 2       Assessment/Plan:Diagnoses and all orders for this visit:    Type 2 diabetes mellitus with diabetic polyneuropathy, without long-term current use of insulin ([facility])  Comments:  Continue with diet control.  Orders:  -     POCT hemoglobin A1c    Acute on chronic diastolic (congestive) heart failure ([facility])    Chronic obstructive pulmonary disease, unspecified COPD type ([facility])  Comments:  Continue current inhaler therapy.  Orders:  -     promethazine-dextromethorphan (PHENERGAN-DM) 6.25-15 mg/5 mL oral syrup; Take 5 mL by mouth 4 (four) times a day as needed for cough    Atrial fibrillation, unspecified type ([facility])  Comments:  Continue Plavix and beta-blocker therapy for rate control    Essential hypertension  Comments:  Stable on ARB therapy  Orders:  -     Comprehensive metabolic panel; Future    Hypercholesterolemia  Comments:  Stable on Crestor therapy.  Orders:  -     Lipid Panel with Direct LDL reflex; Future    PAD (peripheral artery disease) ([facility])  Comments:  Continue Plavix therapy and follow-up by vascular surgery    Hypothyroidism, unspecified type  Comments:  Continue levothyroxine therapy.  Orders:  -     TSH, 3rd generation with Free T4 reflex; Future    Lumbar radiculopathy  Comments:  Continue gabapentin and as needed hydrocodone therapy.  Orders:  -     HYDROcodone-acetaminophen (NORCO) 5-325 mg per tablet; Take 1 tablet by mouth every 6 (six) hours as needed for pain Max Daily Amount: 4 tablets    Insomnia, unspecified type  Comments:  Continue zolpidem CR tx.    Orders:  -     zolpidem (AMBIEN CR) 6.25 MG CR tablet; Take 1 tablet (6.25 mg total) by mouth daily at bedtime as needed for sleep    Encounter for immunization  -     influenza vaccine, high-dose, PF 0.5 mL  (Fluzone High Dose)    Continuous opioid dependence (HCC)  Comments:  Pain contract was completed.    Other orders  -     RA Arthritis Pain Relief 650 MG CR tablet; take 1 tablet by mouth every 8 hours if needed for MILD PAIN ( PAIN SCALE 1-3 )        Reviewed with patient plan to treat with above plan.    Patient instructed to call in 72 hours if not feeling better or if symptoms worsen

## 2024-09-17 LAB
ALBUMIN SERPL-MCNC: 4.2 G/DL (ref 3.5–5.7)
ALP SERPL-CCNC: 82 U/L (ref 35–120)
ALT SERPL-CCNC: 12 U/L
ANION GAP SERPL CALCULATED.3IONS-SCNC: 11 MMOL/L (ref 3–11)
AST SERPL-CCNC: 17 U/L
BILIRUB SERPL-MCNC: 0.4 MG/DL (ref 0.2–1)
BUN SERPL-MCNC: 34 MG/DL (ref 7–25)
CALCIUM SERPL-MCNC: 9.5 MG/DL (ref 8.5–10.1)
CHLORIDE SERPL-SCNC: 102 MMOL/L (ref 100–109)
CHOLEST SERPL-MCNC: 200 MG/DL
CHOLEST/HDLC SERPL: 3.8 {RATIO}
CO2 SERPL-SCNC: 32 MMOL/L (ref 21–31)
CREAT SERPL-MCNC: 1.08 MG/DL (ref 0.4–1.1)
CYTOLOGY CMNT CVX/VAG CYTO-IMP: ABNORMAL
GFR/BSA.PRED SERPLBLD CYS-BASED-ARV: 51 ML/MIN/{1.73_M2}
GLUCOSE SERPL-MCNC: 91 MG/DL (ref 65–99)
HDLC SERPL-MCNC: 53 MG/DL (ref 23–92)
LDLC SERPL CALC-MCNC: 71 MG/DL
NONHDLC SERPL-MCNC: 147 MG/DL
POTASSIUM SERPL-SCNC: 4.4 MMOL/L (ref 3.5–5.2)
PROT SERPL-MCNC: 6.9 G/DL (ref 6.3–8.3)
SODIUM SERPL-SCNC: 145 MMOL/L (ref 135–145)
TRIGL SERPL-MCNC: 379 MG/DL
TSH SERPL-ACNC: 0.73 UIU/ML (ref 0.45–5.33)

## 2024-09-18 DIAGNOSIS — E78.5 HYPERLIPIDEMIA, UNSPECIFIED HYPERLIPIDEMIA TYPE: Primary | ICD-10-CM

## 2024-09-18 RX ORDER — FENOFIBRATE 54 MG/1
54 TABLET ORAL DAILY
Qty: 30 TABLET | Refills: 5 | Status: SHIPPED | OUTPATIENT
Start: 2024-09-18

## 2024-10-12 DIAGNOSIS — G47.00 INSOMNIA, UNSPECIFIED TYPE: ICD-10-CM

## 2024-10-12 DIAGNOSIS — E78.5 HYPERLIPIDEMIA, UNSPECIFIED HYPERLIPIDEMIA TYPE: ICD-10-CM

## 2024-10-12 DIAGNOSIS — I10 ESSENTIAL HYPERTENSION: ICD-10-CM

## 2024-10-14 RX ORDER — CANDESARTAN 16 MG/1
16 TABLET ORAL
Qty: 90 TABLET | Refills: 1 | Status: SHIPPED | OUTPATIENT
Start: 2024-10-14

## 2024-10-14 RX ORDER — ZOLPIDEM TARTRATE 6.25 MG/1
6.25 TABLET, FILM COATED, EXTENDED RELEASE ORAL
Qty: 30 TABLET | Refills: 3 | Status: SHIPPED | OUTPATIENT
Start: 2024-10-14

## 2024-10-14 RX ORDER — FENOFIBRATE 54 MG/1
54 TABLET ORAL DAILY
Qty: 90 TABLET | Refills: 1 | Status: SHIPPED | OUTPATIENT
Start: 2024-10-14

## 2024-10-14 NOTE — TELEPHONE ENCOUNTER
1 [id number] 10/03/2024 10/03/2024 traMADol HCL (Tablet) 28.0 7 50 MG 40.0 SARINA ASH [facility] [place] 0 / 0 [place]    1 [id number] 09/11/2024 09/11/2024 HYDROcodone BITARTRATE 5 MG ORAL TABLET/ACETAMINOPHEN 325 MG (Tablet) 60.0 15 325 MG-5 MG 20.0 SALONI BROADT [facility] Commercial Insurance 0 / 0 [place]    1 [id number] 09/11/2024 09/11/2024 Zolpidem Tartrate (Tablet, Extended Release) 30.0 30 6.25 MG NA SALONI BROADT [facility] [place] 0 / 0 [place]    1 [id number] 08/08/2024 08/08/2024 ALPRAZolam (Tablet) 60.0 30 0.25 MG NA SALONI BROADT [facility] [place] 0 / 0 [place]    1 [id number] 08/08/2024 08/08/2024 Zolpidem Tartrate (Tablet, Extended Release) 30.0 30 6.25 MG NA SALONI BROADT [facility] [place] 0 / 0 [place]    1 [id number] 07/08/2024 07/08/2024 Zolpidem Tartrate (Tablet, Extended Release) 30.0 30 6.25 MG NA HILTON DIOGO [facility] [place] 0 / 0 [place]    1 [id number] 06/07/2024 06/07/2024 Zolpidem Tartrate (Tablet, Extended Release) 30.0 30 6.25 MG NA HILTON DIOGO [facility] [place] 0 / 0 [place]    1 [id number] 05/07/2024 04/03/2024 Zolpidem Tartrate (Tablet, Extended Release) 30.0 30 6.25 MG NA SALONI BROADT [facility] [place] 0 / 0 [place]    1 [id number] 04/04/2024 04/04/2024 Zolpidem Tartrate (Tablet) 30.0 30 10 MG NA SALONI BROADT [facility] [place] 0 / 0 [place]    1 [id number] 02/27/2024 02/27/2024 Zolpidem Tartrate (Tablet, Extended Release) 30.0 30 6.25 MG NA SALONI PRISCA [facility] Medicare 0 / 0

## 2024-12-04 NOTE — TELEPHONE ENCOUNTER
Patient calling for refill of Candesartan. Made aware there are refills on file at pharmacy. Patient to follow up with pharmacy.

## 2024-12-11 ENCOUNTER — TELEPHONE (OUTPATIENT)
Dept: OBGYN CLINIC | Facility: CLINIC | Age: 84
End: 2024-12-11

## 2024-12-11 ENCOUNTER — TELEPHONE (OUTPATIENT)
Age: 84
End: 2024-12-11

## 2024-12-11 NOTE — TELEPHONE ENCOUNTER
Lmom for pt's daughter (phone number under alternative numbers is :[phone]) in regards to pt's appt scheduled for today in [place] office with dr. [place] for 2:30. It looks like pt had already been seen today by [facility] for postmenopausal bleeding. Wanted to check in with pt to confirm if she still needed this appt? Had attempted to call pt's main phone number but kept getting a busy signal and then the call would completley drop.

## 2024-12-12 DIAGNOSIS — E03.9 HYPOTHYROIDISM, UNSPECIFIED TYPE: ICD-10-CM

## 2024-12-12 RX ORDER — LEVOTHYROXINE SODIUM 125 UG/1
125 TABLET ORAL DAILY
Qty: 90 TABLET | Refills: 1 | Status: SHIPPED | OUTPATIENT
Start: 2024-12-12

## 2025-01-08 DIAGNOSIS — J44.9 CHRONIC OBSTRUCTIVE PULMONARY DISEASE, UNSPECIFIED COPD TYPE (HCC): ICD-10-CM

## 2025-01-08 RX ORDER — ALBUTEROL SULFATE 90 UG/1
2 INHALANT RESPIRATORY (INHALATION) EVERY 6 HOURS PRN
Qty: 25.5 G | Refills: 1 | Status: SHIPPED | OUTPATIENT
Start: 2025-01-08

## 2025-01-08 NOTE — TELEPHONE ENCOUNTER
Reason for call:   [x] Refill   [] Prior Auth  [] Other:     Office:   [x] PCP/Provider -   [] Specialty/Provider -     Medication: albuterol (PROVENTIL HFA,VENTOLIN HFA) 90 mcg/act inhaler Inhale 2 puffs every 6 (six) hours as needed for wheezing or shortness of breath       [facility]: [id number] [place] - [address] [facility]      Does the patient have enough for 3 days?   [x] Yes   [] No - Send as HP to POD

## 2025-02-03 DIAGNOSIS — D50.9 IRON DEFICIENCY ANEMIA, UNSPECIFIED IRON DEFICIENCY ANEMIA TYPE: ICD-10-CM

## 2025-02-03 DIAGNOSIS — N18.31 CKD STAGE G3A/A1, GFR 45-59 AND ALBUMIN CREATININE RATIO <30 MG/G (HCC): ICD-10-CM

## 2025-02-03 DIAGNOSIS — I10 ESSENTIAL HYPERTENSION: ICD-10-CM

## 2025-02-03 DIAGNOSIS — N17.9 AKI (ACUTE KIDNEY INJURY) (HCC): ICD-10-CM

## 2025-02-03 RX ORDER — CARVEDILOL 12.5 MG/1
12.5 TABLET ORAL 2 TIMES DAILY WITH MEALS
Qty: 180 TABLET | Refills: 1 | Status: SHIPPED | OUTPATIENT
Start: 2025-02-03 | End: 2026-02-03

## 2025-02-03 RX ORDER — FERROUS SULFATE 325(65) MG
1 TABLET, DELAYED RELEASE (ENTERIC COATED) ORAL
Qty: 30 TABLET | Refills: 3 | Status: SHIPPED | OUTPATIENT
Start: 2025-02-03

## 2025-02-03 NOTE — TELEPHONE ENCOUNTER
Reason for call:   [x] Refill   [] Prior Auth  [] Other:     Office:   [x] PCP/Provider - : Orly Simpson DO   [] Specialty/Provider -     Medication:   carvedilol (COREG) 12.5 mg tablet Take 1 tablet (12.5 mg total) by mouth 2 (two) times a day with meals   180    ferrous sulfate 325 (65 FE) MG EC tablet Take 1 tablet (325 mg total) by mouth daily with breakfast   30    Pharmacy: [id number] - [place] [address] [phone]     Does the patient have enough for 3 days?   [] Yes   [x] No - Send as HP to POD

## 2025-02-13 DIAGNOSIS — G47.00 INSOMNIA, UNSPECIFIED TYPE: ICD-10-CM

## 2025-02-13 RX ORDER — ZOLPIDEM TARTRATE 6.25 MG/1
6.25 TABLET, FILM COATED, EXTENDED RELEASE ORAL
Qty: 30 TABLET | Refills: 0 | Status: SHIPPED | OUTPATIENT
Start: 2025-02-13

## 2025-02-13 NOTE — TELEPHONE ENCOUNTER
1 [id number] 01/14/2025 10/14/2024 Zolpidem Tartrate (Tablet, Extended Release) 30.0 30 6.25 MG NA SAOLNI BROADT [facility] Medicare 3 / 3 [place]    1 [id number] 01/10/2025 01/08/2025 traMADol HCL (Tablet) 28.0 7 50 MG 40.0 SARINA MIHIR [facility] [place] 0 / 0 [place]    1 [id number] 12/13/2024 12/13/2024 traMADol HCL (Tablet) 28.0 7 50 MG 40.0 SARINA MIHIR [facility] Medicare 0 / 0 [place]    1 [id number] 12/13/2024 10/14/2024 Zolpidem Tartrate (Tablet, Extended Release) 30.0 30 6.25 MG NA SALONI BROADT [facility] Medicare 2 / 3 [place]    1 [id number] 11/13/2024 10/14/2024 Zolpidem Tartrate (Tablet, Extended Release) 30.0 30 6.25 MG NA SALONI BROADT [facility] Medicare 1 / 3 [place]    1 [id number] 11/11/2024 11/11/2024 traMADol HCL (Tablet) 28.0 7 50 MG 40.0 SARINA MIHIR [facility] Medicare 0 / 0 [place]    1 [id number] 10/14/2024 10/14/2024 Zolpidem Tartrate (Tablet, Extended Release) 30.0 30 6.25 MG NA SALONI BROADT [facility] Medicare 0 / 3 [place]    1 [id number] 10/03/2024 10/03/2024 traMADol HCL (Tablet) 28.0 7 50 MG 40.0 SARINA MIHIR [facility] Medicare 0 / 0 [place]    1 [id number] 09/11/2024 09/11/2024 HYDROcodone BITARTRATE 5 MG ORAL TABLET/ACETAMINOPHEN 325 MG (Tablet) 60.0 15 325 MG-5 MG 20.0 SALONI BROADT [facility] Commercial Insurance 0 / 0 [place]    1 [id number] 09/11/2024 09/11/2024 Zolpidem Tartrate (Tablet, Extended Release) 30.0 30 6.25 MG NA SALONI BROADT [facility] Medicare 0 / 0 [place]

## 2025-02-13 NOTE — TELEPHONE ENCOUNTER
Reason for call:   [x] Refill   [] Prior Auth  [] Other:     Office:   [x] PCP/Provider - Orly Simpson DO   [] Specialty/Provider -     Medication: zolpidem (AMBIEN CR) 6.25 MG CR tablet     Dose/Frequency: take 1 tablet at bedtime if needed for sleep     Quantity: 30    Pharmacy:  [id number] - [place] [address]     Does the patient have enough for 3 days?   [] Yes   [x] No - Send as HP to POD

## 2025-03-12 DIAGNOSIS — I10 ESSENTIAL HYPERTENSION: ICD-10-CM

## 2025-03-12 DIAGNOSIS — E78.5 HYPERLIPIDEMIA, UNSPECIFIED HYPERLIPIDEMIA TYPE: ICD-10-CM

## 2025-03-12 DIAGNOSIS — N18.31 CKD STAGE G3A/A1, GFR 45-59 AND ALBUMIN CREATININE RATIO <30 MG/G (HCC): ICD-10-CM

## 2025-03-12 DIAGNOSIS — N17.9 AKI (ACUTE KIDNEY INJURY) (HCC): ICD-10-CM

## 2025-03-12 DIAGNOSIS — G47.00 INSOMNIA, UNSPECIFIED TYPE: ICD-10-CM

## 2025-03-12 RX ORDER — FENOFIBRATE 54 MG/1
54 TABLET ORAL DAILY
Qty: 90 TABLET | Refills: 1 | Status: SHIPPED | OUTPATIENT
Start: 2025-03-12

## 2025-03-12 RX ORDER — TORSEMIDE 20 MG/1
20 TABLET ORAL DAILY
Qty: 90 TABLET | Refills: 1 | Status: SHIPPED | OUTPATIENT
Start: 2025-03-12 | End: 2025-09-08

## 2025-03-12 NOTE — TELEPHONE ENCOUNTER
1 [id number] 02/13/2025 02/13/2025 traMADol HCL (Tablet) 20.0 5 50 MG 40.0 [facility] 0 [place]    1 [id number] 02/13/2025 02/13/2025 Zolpidem Tartrate (Tablet, Extended Release) 30.0 30 6.25 MG NA SALONI BROADT [facility] Medicare 0 / 0 [place]    2 [id number] 01/14/2025 10/14/2024 Zolpidem Tartrate (Tablet, Extended Release) 30.0 30 6.25 MG NA [facility] 3 / 3 [place]    2 [id number] 01/10/2025 01/08/2025 traMADol HCL (Tablet) 28.0 7 50 MG 40.0 SARINA MIHIR [facility] Medicare 0 / 0 [place]    2 [id number] 12/13/2024 12/13/2024 traMADol HCL (Tablet) 28.0 7 50 MG 40.0 SARINA MIHIR [facility] Medicare 0 / 0 [place]    2 [id number] 12/13/2024 10/14/2024 Zolpidem Tartrate (Tablet, Extended Release) 30.0 30 6.25 MG NA SALONI BROADT [facility] Medicare 2 / 3 [place]    2 [id number] 11/13/2024 10/14/2024 Zolpidem Tartrate (Tablet, Extended Release) 30.0 30 6.25 MG NA SALONI BROADT [facility] Medicare 1 / 3 [place]    2 [id number] 11/11/2024 11/11/2024 traMADol HCL (Tablet) 28.0 7 50 MG 40.0 [facility]IR [facility] 0 / 0 [place]    2 [id number] 10/14/2024 10/14/2024 Zolpidem Tartrate (Tablet, Extended Release) 30.0 30 6.25 MG NA [facility] 0 / 3 [place]    2 [id number] 10/03/2024 10/03/2024 traMADol HCL (Tablet) 28.0 7 50 MG 40.0 SARINA MIHIR [facility] Medicare 0 / 0

## 2025-03-12 NOTE — TELEPHONE ENCOUNTER
Reason for call:   [x] Refill   [] Prior Auth  [] Other:     Office:   [x] PCP/Provider -   [] Specialty/Provider -     Medication:   - Fenofibrate 54 mg- take 1 tablet by mouth once daily  - Torsemide 20mg- take 1 tablet by mouth daily  - Zolpidem 6.25 mg- take 1 tablet by mouth daily at bedtime as needed      Pharmacy: [facility]    Local Pharmacy   Does the patient have enough for 3 days?   [] Yes   [x] No - Send as HP to POD    Mail Away Pharmacy   Does the patient have enough for 10 days?   [] Yes   [] No - Send as HP to POD

## 2025-03-13 RX ORDER — ZOLPIDEM TARTRATE 6.25 MG/1
6.25 TABLET, FILM COATED, EXTENDED RELEASE ORAL
Qty: 30 TABLET | Refills: 0 | Status: SHIPPED | OUTPATIENT
Start: 2025-03-13

## 2025-03-25 ENCOUNTER — TELEPHONE (OUTPATIENT)
Dept: FAMILY MEDICINE CLINIC | Facility: CLINIC | Age: 85
End: 2025-03-25

## 2025-03-28 ENCOUNTER — TRANSITIONAL CARE MANAGEMENT (OUTPATIENT)
Dept: FAMILY MEDICINE CLINIC | Facility: CLINIC | Age: 85
End: 2025-03-28

## 2025-04-01 ENCOUNTER — OFFICE VISIT (OUTPATIENT)
Dept: FAMILY MEDICINE CLINIC | Facility: CLINIC | Age: 85
End: 2025-04-01
Payer: COMMERCIAL

## 2025-04-01 VITALS
WEIGHT: 197 LBS | HEART RATE: 87 BPM | TEMPERATURE: 97.1 F | SYSTOLIC BLOOD PRESSURE: 120 MMHG | OXYGEN SATURATION: 95 % | DIASTOLIC BLOOD PRESSURE: 78 MMHG | HEIGHT: 64 IN | BODY MASS INDEX: 33.63 KG/M2

## 2025-04-01 DIAGNOSIS — R55 SYNCOPE, UNSPECIFIED SYNCOPE TYPE: ICD-10-CM

## 2025-04-01 DIAGNOSIS — S09.90XD TRAUMATIC INJURY OF HEAD, SUBSEQUENT ENCOUNTER: ICD-10-CM

## 2025-04-01 DIAGNOSIS — M54.16 LUMBAR RADICULOPATHY: Primary | ICD-10-CM

## 2025-04-01 PROCEDURE — 99496 TRANSJ CARE MGMT HIGH F2F 7D: CPT | Performed by: FAMILY MEDICINE

## 2025-04-01 RX ORDER — GABAPENTIN 100 MG/1
CAPSULE ORAL
Qty: 70 CAPSULE | Refills: 0 | Status: SHIPPED | OUTPATIENT
Start: 2025-04-01

## 2025-04-01 RX ORDER — METHOCARBAMOL 500 MG/1
500 TABLET, FILM COATED ORAL 4 TIMES DAILY PRN
COMMUNITY
Start: 2025-03-17

## 2025-04-01 RX ORDER — TRAMADOL HYDROCHLORIDE 50 MG/1
50 TABLET ORAL EVERY 6 HOURS PRN
COMMUNITY
Start: 2025-03-17

## 2025-04-01 RX ORDER — NIACIN 500 MG
500 TABLET ORAL 2 TIMES DAILY WITH MEALS
COMMUNITY

## 2025-04-01 RX ORDER — ONDANSETRON 4 MG/1
1 TABLET, ORALLY DISINTEGRATING ORAL EVERY 12 HOURS PRN
Qty: 60 PATCH | Refills: 3 | Status: SHIPPED | OUTPATIENT
Start: 2025-04-01

## 2025-04-01 RX ORDER — BUDESONIDE AND FORMOTEROL FUMARATE DIHYDRATE 160; 4.5 UG/1; UG/1
2 AEROSOL RESPIRATORY (INHALATION) 2 TIMES DAILY
COMMUNITY

## 2025-04-01 RX ORDER — CARVEDILOL 3.12 MG/1
3.12 TABLET ORAL 2 TIMES DAILY WITH MEALS
COMMUNITY

## 2025-04-02 ENCOUNTER — TELEPHONE (OUTPATIENT)
Age: 85
End: 2025-04-02

## 2025-04-02 NOTE — TELEPHONE ENCOUNTER
Diclofenac Epolamine 1.3 % [facility] needs a PA, please have pcp complete and sign OV notes. Please notify PA team when complete

## 2025-04-02 NOTE — TELEPHONE ENCOUNTER
Shirin from patient's insurance company called to follow-up on patient's post hospitalization follow-up. I confirmed that patient was seen for her TCM appointment on 4/1. She requested discharge summary that is in patient's chart from [facility] be faxed to: [phone]    Also recommending tests related to high blood pressure and patient's kidneys.    Please fax over the discharge summary.

## 2025-04-03 PROBLEM — I50.33 ACUTE ON CHRONIC DIASTOLIC (CONGESTIVE) HEART FAILURE (HCC): Status: RESOLVED | Noted: 2022-09-28 | Resolved: 2025-04-03

## 2025-04-03 NOTE — PROGRESS NOTES
Transition of Care Visit:  Name: Kassandra Pacheco      : 1940      MRN: [id number]  Encounter Provider: Orly Simpson DO  Encounter Date: 2025   Encounter department: [facility]    Assessment & Plan  Lumbar radiculopathy  Continue with follow up with pain management  Orders:    gabapentin (Neurontin) 100 mg capsule; 2 po bid x10 days then 1 po bid x 10 days then 1 daily x 10 days and off    Diclofenac Epolamine 1.3 % PTCH; Apply 1 patch topically every 12 (twelve) hours as needed (lumbar back pain)    Syncope, unspecified syncope type    Orders:    VAS carotid complete study; Future    Traumatic injury of head, subsequent encounter  NO hematoma; pt to call if she develops headaches          History of Present Illness     Transitional Care Management Review:   Kassandra Pacheco is a 84 y.o. female here for TCM follow up.     During the TCM phone call patient stated:  TCM Call (since 3/20/2025)       Date and time call was made  3/28/2025  1:33 PM    Hospital care reviewed  Records reviewed    Date of Admission  25    Date of discharge  25    Diagnosis  Syncope    Disposition  Home    Were the patients medications reviewed and updated  Yes    Current Symptoms  None          TCM Call (since 3/20/2025)       Post hospital issues  None    Scheduled for follow up?  Yes    Did you obtain your prescribed medications  No    Do you need help managing your prescriptions or medications  No    Is transportation to your appointment needed  No    I have advised the patient to call PCP with any new or worsening symptoms  TYSON Thayer          The patient presents after hospitalization where patient experienced a syncopal event fell and struck her head in her kitchen.  There was no subdural hematoma and she ruled out for cardiac etiology of syncope as well as CT of the head was negative.  She does need an ultrasound of her carotids to finish the workup.  Since falling and hitting  "her head she has had some occasional headaches but no expressive aphasia no dizziness and no weakness.    Fatigue  This is a new problem. The current episode started 1 to 4 weeks ago. The problem occurs constantly. The problem has been unchanged. Associated symptoms include arthralgias, fatigue and vomiting. Pertinent negatives include no abdominal pain, anorexia, change in bowel habit, chest pain, chills, congestion, coughing, diaphoresis, fever, headaches, joint swelling, myalgias, nausea, neck pain, numbness, rash, sore throat, swollen glands, urinary symptoms, vertigo, visual change or weakness. Nothing aggravates the symptoms. She has tried nothing for the symptoms. The treatment provided no relief.     Review of Systems   Constitutional:  Positive for fatigue. Negative for appetite change, chills, diaphoresis and fever.   HENT:  Negative for congestion, ear pain, facial swelling, rhinorrhea, sinus pain, sore throat and trouble swallowing.    Eyes:  Negative for discharge and redness.   Respiratory:  Negative for cough, chest tightness, shortness of breath and wheezing.    Cardiovascular:  Negative for chest pain and palpitations.   Gastrointestinal:  Positive for vomiting. Negative for abdominal pain, anorexia, change in bowel habit, diarrhea and nausea.   Endocrine: Negative for polyuria.   Genitourinary:  Negative for dysuria and urgency.   Musculoskeletal:  Positive for arthralgias and back pain. Negative for joint swelling, myalgias and neck pain.        + lumbar back pain   Skin:  Negative for rash.   Neurological:  Positive for syncope. Negative for dizziness, vertigo, weakness, numbness and headaches.   Hematological:  Negative for adenopathy.   Psychiatric/Behavioral:  Negative for behavioral problems, confusion and sleep disturbance.    All other systems reviewed and are negative.    Objective   /78   Pulse 87   Temp (!) 97.1 °F (36.2 °C)   Ht 5' 4\" (1.626 m)   Wt 89.4 kg (197 lb)   SpO2 " 95%   BMI 33.81 kg/m²     Physical Exam  Vitals and nursing note reviewed.   Constitutional:       General: She is not in acute distress.     Appearance: Normal appearance. She is well-developed. She is not ill-appearing or diaphoretic.   HENT:      Head: Normocephalic and atraumatic.      Right Ear: Tympanic membrane, ear canal and external ear normal.      Left Ear: Tympanic membrane, ear canal and external ear normal.      Nose: Nose normal. No congestion or rhinorrhea.      Mouth/Throat:      Mouth: Mucous membranes are moist.      Pharynx: Oropharynx is clear. No oropharyngeal exudate or posterior oropharyngeal erythema.   Eyes:      General: No scleral icterus.        Right eye: No discharge.         Left eye: No discharge.      Extraocular Movements: Extraocular movements intact.      Conjunctiva/sclera: Conjunctivae normal.      Pupils: Pupils are equal, round, and reactive to light.   Neck:      Thyroid: No thyromegaly.      Vascular: No carotid bruit or JVD.      Trachea: No tracheal deviation.   Cardiovascular:      Rate and Rhythm: Normal rate and regular rhythm.      Pulses: Normal pulses.      Heart sounds: Normal heart sounds. No murmur heard.  Pulmonary:      Effort: Pulmonary effort is normal. No respiratory distress.      Breath sounds: Normal breath sounds. No stridor. No wheezing, rhonchi or rales.   Abdominal:      General: Abdomen is flat. Bowel sounds are normal. There is no distension.      Palpations: Abdomen is soft. There is no mass.      Tenderness: There is no abdominal tenderness. There is no guarding or rebound.   Musculoskeletal:         General: No swelling, tenderness or deformity. Normal range of motion.      Cervical back: Normal range of motion and neck supple. No rigidity.      Right lower leg: No edema.      Left lower leg: No edema.   Lymphadenopathy:      Cervical: No cervical adenopathy.   Skin:     General: Skin is warm and dry.      Capillary Refill: Capillary refill  takes less than 2 seconds.      Coloration: Skin is not jaundiced.      Findings: No bruising, erythema or rash.   Neurological:      General: No focal deficit present.      Mental Status: She is alert and oriented to person, place, and time.      Cranial Nerves: No cranial nerve deficit.      Sensory: No sensory deficit.      Motor: No abnormal muscle tone.      Coordination: Coordination normal.      Gait: Gait normal.      Deep Tendon Reflexes: Reflexes are normal and symmetric. Reflexes normal.   Psychiatric:         Mood and Affect: Mood normal.         Behavior: Behavior normal.         Thought Content: Thought content normal.         Judgment: Judgment normal.           Administrative Statements   I have spent a total time of 20 minutes in caring for this patient on the day of the visit/encounter including Diagnostic results, Prognosis, Risks and benefits of tx options, Instructions for management, Patient and family education, Importance of tx compliance, Risk factor reductions, and Impressions.

## 2025-04-03 NOTE — ASSESSMENT & PLAN NOTE
Continue with follow up with pain management  Orders:    gabapentin (Neurontin) 100 mg capsule; 2 po bid x10 days then 1 po bid x 10 days then 1 daily x 10 days and off    Diclofenac Epolamine 1.3 % PTCH; Apply 1 patch topically every 12 (twelve) hours as needed (lumbar back pain)

## 2025-04-03 NOTE — TELEPHONE ENCOUNTER
PA Diclofenac Epolamine 1.3 % PTCH SUBMITTED     to [employer]     via    []CMM-KEY:    [x]Surescripts   []Availity-Auth ID #  NDC #    []Faxed to plan   []Other website    []Phone call Case ID #      []PA sent as URGENT    All office notes, labs and other pertaining documents and studies sent. Clinical questions answered. Awaiting determination from insurance company.     Turnaround time for your insurance to make a decision on your Prior Authorization can take 7-21 business days.

## 2025-04-04 NOTE — TELEPHONE ENCOUNTER
PA Diclofenac Epolamine 1.3 % PTCH DENIED    Reason:(Screenshot if applicable)        Message sent to office clinical pool Yes    Denial letter scanned into Media Yes    Appeal started No (Provider will need to decide if appeal is warranted and send clinical documentation to Prior Authorization Team for initiation.)    **Please follow up with your patient regarding denial and next steps**

## 2025-04-17 ENCOUNTER — TELEPHONE (OUTPATIENT)
Dept: FAMILY MEDICINE CLINIC | Facility: CLINIC | Age: 85
End: 2025-04-17

## 2025-04-17 ENCOUNTER — HOSPITAL ENCOUNTER (OUTPATIENT)
Dept: NON INVASIVE DIAGNOSTICS | Facility: CLINIC | Age: 85
Discharge: HOME | End: 2025-04-17
Attending: FAMILY MEDICINE
Payer: COMMERCIAL

## 2025-04-17 ENCOUNTER — RESULTS FOLLOW-UP (OUTPATIENT)
Dept: FAMILY MEDICINE CLINIC | Facility: CLINIC | Age: 85
End: 2025-04-17

## 2025-04-17 DIAGNOSIS — R55 SYNCOPE, UNSPECIFIED SYNCOPE TYPE: ICD-10-CM

## 2025-04-17 DIAGNOSIS — F41.9 ANXIETY: ICD-10-CM

## 2025-04-17 PROCEDURE — 93880 EXTRACRANIAL BILAT STUDY: CPT

## 2025-04-17 PROCEDURE — 93880 EXTRACRANIAL BILAT STUDY: CPT | Performed by: SURGERY

## 2025-04-17 NOTE — TELEPHONE ENCOUNTER
Patient wanted to know if you could take over her Tramadol 50 mg q 6 hours from [facility]. They only give her 20 pills at a time and she is always running to the pharmacy. She also would rather call here for refills.     1 [id number] 03/17/2025 03/17/2025 traMADol HCL (Tablet) 20.0 5 50 MG 40.0 SARINA MIHIR [employer] [facility] [place] 0 / 0 [place]    1 [id number] 03/14/2025 03/13/2025 Zolpidem Tartrate (Tablet, Extended Release) 30.0 30 6.25 MG NA [facility] [employer] [facility] Medicare 0 / 0 [place]    2 [id number] 02/13/2025 02/13/2025 traMADol HCL (Tablet) 20.0 5 50 MG 40.0 SARINA MIHIR [facility] Medicare 0 / 0 [place]

## 2025-04-18 DIAGNOSIS — F32.9 REACTIVE DEPRESSION: ICD-10-CM

## 2025-04-18 DIAGNOSIS — K21.9 GASTROESOPHAGEAL REFLUX DISEASE WITHOUT ESOPHAGITIS: ICD-10-CM

## 2025-04-18 DIAGNOSIS — G47.00 INSOMNIA, UNSPECIFIED TYPE: ICD-10-CM

## 2025-04-18 DIAGNOSIS — M54.16 LUMBAR RADICULOPATHY: Primary | ICD-10-CM

## 2025-04-18 RX ORDER — PANTOPRAZOLE SODIUM 40 MG/1
40 TABLET, DELAYED RELEASE ORAL DAILY
Qty: 90 TABLET | Refills: 1 | Status: SHIPPED | OUTPATIENT
Start: 2025-04-18

## 2025-04-18 RX ORDER — ALPRAZOLAM 0.25 MG
0.25 TABLET ORAL 2 TIMES DAILY PRN
Qty: 180 TABLET | Refills: 3 | Status: SHIPPED | OUTPATIENT
Start: 2025-04-18

## 2025-04-18 RX ORDER — ZOLPIDEM TARTRATE 6.25 MG/1
6.25 TABLET, FILM COATED, EXTENDED RELEASE ORAL
Qty: 30 TABLET | Refills: 0 | Status: SHIPPED | OUTPATIENT
Start: 2025-04-18

## 2025-04-18 RX ORDER — ESCITALOPRAM OXALATE 10 MG/1
10 TABLET ORAL DAILY
Qty: 30 TABLET | Refills: 5 | Status: SHIPPED | OUTPATIENT
Start: 2025-04-18

## 2025-04-18 RX ORDER — TRAMADOL HYDROCHLORIDE 50 MG/1
50 TABLET ORAL EVERY 8 HOURS PRN
Qty: 90 TABLET | Refills: 0 | Status: SHIPPED | OUTPATIENT
Start: 2025-04-18

## 2025-04-18 NOTE — TELEPHONE ENCOUNTER
1 [id number] 03/17/2025 03/17/2025 traMADol HCL (Tablet) 20.0 5 50 MG 40.0 [facility] 0 / 0 [place]    1 [id number] 03/14/2025 03/13/2025 Zolpidem Tartrate (Tablet, Extended Release) 30.0 30 6.25 MG NA SALONI BROADT [facility] Medicare 0 / 0 [place]    2 [id number] 02/13/2025 02/13/2025 traMADol HCL (Tablet) 20.0 5 50 MG 40.0 SARINA MIHIR [facility] Medicare 0 / 0 [place]    2 [id number] 02/13/2025 02/13/2025 Zolpidem Tartrate (Tablet, Extended Release) 30.0 30 6.25 MG NA SALONI BROADT [facility] Medicare 0 / 0 [place]    1 [id number] 01/14/2025 10/14/2024 Zolpidem Tartrate (Tablet, Extended Release) 30.0 30 6.25 MG NA SALONI BROADT [facility] Medicare 3 / 3 [place]    1 [id number] 01/10/2025 01/08/2025 traMADol HCL (Tablet) 28.0 7 50 MG 40.0 SARINA MIHIR [facility] Medicare 0 / 0 [place]    1 [id number] 12/13/2024 12/13/2024 traMADol HCL (Tablet) 28.0 7 50 MG 40.0 SARINA MIHIR [facility] Medicare 0 / 0 [place]    1 [id number] 12/13/2024 10/14/2024 Zolpidem Tartrate (Tablet, Extended Release) 30.0 30 6.25 MG NA SALONI BROADT [facility] Medicare 2 / 3 [place]    1 [id number] 11/13/2024 10/14/2024 Zolpidem Tartrate (Tablet, Extended Release) 30.0 30 6.25 MG NA SALONI BROADT [facility] Medicare 1 / 3 [place]    1 [id number] 11/11/2024 11/11/2024 traMADol HCL (Tablet) 28.0 7 50 MG 40.0 SARINA MIHIR [facility] Medicare 0 / 0

## 2025-04-18 NOTE — TELEPHONE ENCOUNTER
Patient called the RX Refill Line. Message is being forwarded to the office.     Patient called asking for other refills and mentioned the tramadol, I read her the dr's message as she never heard back form anyone and she said that she does not get any injections with pain management     Please contact patient at

## 2025-04-18 NOTE — TELEPHONE ENCOUNTER
Reason for call:   [x] Refill   [] Prior Auth  [] Other:     Office:   [x] PCP/Provider - Dr Simpson   [] Specialty/Provider -     Medication: pantoprazole 40 mg take 1 tablet daily #100    Escitalopram 10 mg take daily #30    Zolpidem 6.25 mg take daily at bedtime as needed #30      Pharmacy: [facility]    Local Pharmacy   Does the patient have enough for 3 days?   [] Yes   [x] No - Send as HP to POD- she is out of the escitalopram    Mail Away Pharmacy   Does the patient have enough for 10 days?   [] Yes   [] No - Send as HP to POD

## 2025-04-22 ENCOUNTER — TELEPHONE (OUTPATIENT)
Age: 85
End: 2025-04-22

## 2025-04-22 NOTE — TELEPHONE ENCOUNTER
PA for Zolpidem 6.25mg SUBMITTED to [facility]    via    []CMM-KEY:   [x]Surescripts-Case ID #   []Availity-Auth ID # NDC #   []Faxed to plan   []Other website   []Phone call Case ID #     [x]PA sent as URGENT    All office notes, labs and other pertaining documents and studies sent. Clinical questions answered. Awaiting determination from insurance company.     Turnaround time for your insurance to make a decision on your Prior Authorization can take 7-21 business days.

## 2025-04-24 NOTE — TELEPHONE ENCOUNTER
PA  Zolpidem 6.25mg S DENIED    Reason:(Screenshot if applicable)        Message sent to office clinical pool Yes    Denial letter scanned into Media Yes    Appeal started No (Provider will need to decide if appeal is warranted and send clinical documentation to Prior Authorization Team for initiation.)    **Please follow up with your patient regarding denial and next steps**

## 2025-05-02 ENCOUNTER — RA CDI HCC (OUTPATIENT)
Dept: OTHER | Facility: HOSPITAL | Age: 85
End: 2025-05-02

## 2025-05-02 RX ORDER — GABAPENTIN 600 MG/1
1 TABLET ORAL 2 TIMES DAILY
COMMUNITY
Start: 2025-02-12

## 2025-05-05 ENCOUNTER — OFFICE VISIT (OUTPATIENT)
Dept: FAMILY MEDICINE CLINIC | Facility: CLINIC | Age: 85
End: 2025-05-05
Payer: COMMERCIAL

## 2025-05-05 VITALS
DIASTOLIC BLOOD PRESSURE: 82 MMHG | HEART RATE: 81 BPM | TEMPERATURE: 97.9 F | SYSTOLIC BLOOD PRESSURE: 138 MMHG | HEIGHT: 64 IN | WEIGHT: 194.4 LBS | OXYGEN SATURATION: 93 % | BODY MASS INDEX: 33.19 KG/M2

## 2025-05-05 DIAGNOSIS — N18.31 STAGE 3A CHRONIC KIDNEY DISEASE (HCC): ICD-10-CM

## 2025-05-05 DIAGNOSIS — E11.42 TYPE 2 DIABETES MELLITUS WITH DIABETIC POLYNEUROPATHY, WITHOUT LONG-TERM CURRENT USE OF INSULIN (HCC): Primary | ICD-10-CM

## 2025-05-05 DIAGNOSIS — F11.20 CONTINUOUS OPIOID DEPENDENCE (HCC): ICD-10-CM

## 2025-05-05 DIAGNOSIS — I13.0 HYPERTENSIVE HEART AND CHRONIC KIDNEY DISEASE WITH HEART FAILURE AND STAGE 1 THROUGH STAGE 4 CHRONIC KIDNEY DISEASE, OR UNSPECIFIED CHRONIC KIDNEY DISEASE (HCC): ICD-10-CM

## 2025-05-05 DIAGNOSIS — J44.9 CHRONIC OBSTRUCTIVE PULMONARY DISEASE, UNSPECIFIED COPD TYPE (HCC): ICD-10-CM

## 2025-05-05 DIAGNOSIS — I48.91 ATRIAL FIBRILLATION, UNSPECIFIED TYPE (HCC): ICD-10-CM

## 2025-05-05 DIAGNOSIS — Z00.00 MEDICARE ANNUAL WELLNESS VISIT, SUBSEQUENT: ICD-10-CM

## 2025-05-05 DIAGNOSIS — G25.81 RESTLESS LEG SYNDROME: ICD-10-CM

## 2025-05-05 DIAGNOSIS — Z78.0 ASYMPTOMATIC POSTMENOPAUSAL ESTROGEN DEFICIENCY: ICD-10-CM

## 2025-05-05 LAB — SL AMB POCT HEMOGLOBIN AIC: 6.5 (ref ?–6.5)

## 2025-05-05 PROCEDURE — G0439 PPPS, SUBSEQ VISIT: HCPCS | Performed by: FAMILY MEDICINE

## 2025-05-05 PROCEDURE — 99214 OFFICE O/P EST MOD 30 MIN: CPT | Performed by: FAMILY MEDICINE

## 2025-05-05 PROCEDURE — G2211 COMPLEX E/M VISIT ADD ON: HCPCS | Performed by: FAMILY MEDICINE

## 2025-05-05 PROCEDURE — 83036 HEMOGLOBIN GLYCOSYLATED A1C: CPT | Performed by: FAMILY MEDICINE

## 2025-05-05 RX ORDER — ROPINIROLE 0.5 MG/1
0.5 TABLET, FILM COATED ORAL
Qty: 30 TABLET | Refills: 2 | Status: SHIPPED | OUTPATIENT
Start: 2025-05-05

## 2025-05-05 NOTE — ASSESSMENT & PLAN NOTE
Lab Results   Component Value Date    EGFR 50 (L) 03/26/2025    EGFR 47 (L) 03/25/2025    EGFR 45 (L) 12/10/2024    CREATININE 1.09 03/26/2025    CREATININE 1.14 (H) 03/25/2025    CREATININE 1.18 (H) 12/10/2024     Continue with renal surveillance

## 2025-05-05 NOTE — PROGRESS NOTES
Name: Kassandra Pacheco      : 1940      MRN: [id number]  Encounter Provider: Orly Simpson DO  Encounter Date: 2025   Encounter department: [facility]  :  Assessment & Plan  Medicare annual wellness visit, subsequent         Type 2 diabetes mellitus with diabetic polyneuropathy, without long-term current use of insulin (HCC)  Continue with current therapy.  Hemoglobin A1c is 6.5 today.  Lab Results   Component Value Date    HGBA1C 6.0 2024       Orders:    POCT hemoglobin A1c    Restless leg syndrome  Will initiate wrote pindolol 0.5 mg at bedtime and slowly titrate up pending the patient's response.  Orders:    rOPINIRole (REQUIP) 0.5 mg tablet; Take 1 tablet (0.5 mg total) by mouth daily at bedtime    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, or unspecified chronic kidney disease (HCC)  Wt Readings from Last 3 Encounters:   25 88.2 kg (194 lb 6.4 oz)   25 89.4 kg (197 lb)   24 88.5 kg (195 lb 3.2 oz)     Continue with current therapy at the present time.               Chronic obstructive pulmonary disease, unspecified COPD type (HCC)  Continue with current inhaler therapy       Atrial fibrillation, unspecified type (HCC)  Stable on rate control as well as Plavix therapy.       Stage 3a chronic kidney disease (HCC)  Lab Results   Component Value Date    EGFR 50 (L) 2025    EGFR 47 (L) 2025    EGFR 45 (L) 12/10/2024    CREATININE 1.09 2025    CREATININE 1.14 (H) 2025    CREATININE 1.18 (H) 12/10/2024     Continue with renal surveillance       Asymptomatic postmenopausal estrogen deficiency    Orders:    DXA bone density spine hip and pelvis; Future    Continuous opioid dependence (HCC)  Continue with current meds to control lumbar back pain.          Preventive health issues were discussed with patient, and age appropriate screening tests were  ordered as noted in patient's After Visit Summary. Personalized health advice and appropriate referrals for health education or preventive services given if needed, as noted in patient's After Visit Summary.    History of Present Illness     The patient presents for follow-up of type 2 diabetes with diabetic polyneuropathy, hypertension, hypercholesterolemia, COPD A-fib and chronic kidney disease.  All are stable at this time.  She is weaning off of gabapentin and wants to see how she does on Requip for restless leg syndrome.       Patient Care Team:  Orly Simpson DO as PCP - General  Orly Simpson DO as PCP - PCP-[facility] Attributed-Roster  DO Luann Penny MD (Nephrology)    Review of Systems   Constitutional:  Negative for appetite change, chills and fever.   HENT:  Negative for ear pain, facial swelling, rhinorrhea, sinus pain, sore throat and trouble swallowing.    Eyes:  Negative for discharge and redness.   Respiratory:  Negative for chest tightness, shortness of breath and wheezing.    Cardiovascular:  Negative for chest pain and palpitations.   Gastrointestinal:  Negative for abdominal pain, diarrhea, nausea and vomiting.   Endocrine: Negative for polyuria.   Genitourinary:  Negative for dysuria and urgency.   Musculoskeletal:  Negative for arthralgias and back pain.   Skin:  Negative for rash.   Neurological:  Negative for dizziness, weakness and headaches.        Positive restless leg syndrome   Hematological:  Negative for adenopathy.   Psychiatric/Behavioral:  Negative for behavioral problems, confusion and sleep disturbance.    All other systems reviewed and are negative.    Medical History Reviewed by provider this encounter:       Annual Wellness Visit Questionnaire   Kassandra is here for her Subsequent Wellness visit. Last Medicare Wellness visit information reviewed, patient interviewed, no change since last AWV.     Health Risk Assessment:   Patient  rates overall health as fair. Patient feels that their physical health rating is slightly worse. Patient is satisfied with their life. Eyesight was rated as same. Hearing was rated as same. Patient feels that their emotional and mental health rating is slightly worse. Patients states they are sometimes angry. Patient states they are always unusually tired/fatigued. Pain experienced in the last 7 days has been some. Patient's pain rating has been 5/10. Patient states that she has experienced no weight loss or gain in last 6 months.     Depression Screening:   PHQ-2 Score: 1      Fall Risk Screening:   In the past year, patient has experienced: no history of falling in past year      Urinary Incontinence Screening:   Patient has not leaked urine accidently in the last six months.     Home Safety:  Patient has trouble with stairs inside or outside of their home. Patient has working smoke alarms and has no working carbon monoxide detector. Home safety hazards include: none.     Nutrition:   Current diet is No Added Salt.     Medications:   Patient is currently taking over-the-counter supplements. OTC medications include: see medication list. Patient is able to manage medications.     Activities of Daily Living (ADLs)/Instrumental Activities of Daily Living (IADLs):   Walk and transfer into and out of bed and chair?: Yes  Dress and groom yourself?: Yes    Bathe or shower yourself?: Yes    Feed yourself? Yes  Do your laundry/housekeeping?: Yes  Manage your money, pay your bills and track your expenses?: Yes  Make your own meals?: No    Do your own shopping?: No    Previous Hospitalizations:   Any hospitalizations or ED visits within the last 12 months?: Yes    How many hospitalizations have you had in the last year?: 1-2    Advance Care Planning:   Living will: Yes    Durable POA for healthcare: Yes    Advanced directive: Yes    Advanced directive counseling given: Yes    ACP document given: Yes    Patient declined ACP  directive: No    End of Life Decisions reviewed with patient: Yes    Provider agrees with end of life decisions: Yes      Cognitive Screening:   Provider or family/friend/caregiver concerned regarding cognition?: No    Preventive Screenings      Cardiovascular Screening:    General: Screening Not Indicated and History Lipid Disorder      Diabetes Screening:     General: Screening Not Indicated and History Diabetes      Colorectal Cancer Screening:     General: Screening Current      Breast Cancer Screening:     General: Patient Declines      Cervical Cancer Screening:    General: Screening Not Indicated      Osteoporosis Screening:    General: Risks and Benefits Discussed    Due for: DXA Axial      Abdominal Aortic Aneurysm (AAA) Screening:        General: Screening Current and Screening Not Indicated      Lung Cancer Screening:     General: Screening Not Indicated      Hepatitis C Screening:    General: Screening Current    Immunizations:  - Immunizations due: Zoster (Shingrix)    Screening, Brief Intervention, and Referral to Treatment (SBIRT)     Screening    Typical number of drinks in a week: 0    Single Item Drug Screening:  How often have you used an illegal drug (including marijuana) or a prescription medication for non-medical reasons in the past year? never    Single Item Drug Screen Score: 0  Interpretation: Negative screen for possible drug use disorder    Review of Current Opioid Use  Opioid Risk Tool (ORT) Score: 3  Opioid Risk Tool (ORT) Interpretation: Score 0-3: Low risk for opioid misuse    Social Drivers of Health     Financial Resource Strain: Not At Risk (3/25/2025)    Received from [facility]    Financial Insecurity     In the last 12 months did you skip medications to save money?: No     In the last 12 months was there a time when you needed to see a doctor but could not because of cost?: No   Food Insecurity: No Food Insecurity (3/25/2025)    [facility]    Food Insecurity     In the last 12 months did you ever eat less than you felt you should because there wasn't enough money for food?: No   Transportation Needs: No Transportation Needs (4/10/2025)    Received from [facility]    OASIS : Transportation     Lack of Transportation (Medical): No     Lack of Transportation (Non-Medical): No     Patient Unable or Declines to Respond: No   Housing Stability: Not At Risk (3/25/2025)    Received from [facility]    Housing Stability     Are you worried that in the next 2 months you may not have stable housing?: No   Utilities: Not At Risk (3/25/2025)    Received from [facility]    Utilities Insecurity     In the last 12 months has the electric, gas, oil, or water company threatened to shut off services in your home?: No     No results found.    Objective   Ht 5' 4\" (1.626 m)   BMI 33.81 kg/m²     Physical Exam  Vitals and nursing note reviewed.   Constitutional:       General: She is not in acute distress.     Appearance: Normal appearance. She is well-developed. She is not ill-appearing or diaphoretic.   HENT:      Head: Normocephalic and atraumatic.      Right Ear: Tympanic membrane, ear canal and external ear normal.      Left Ear: Tympanic membrane, ear canal and external ear normal.      Nose: Nose normal. No congestion or rhinorrhea.      Mouth/Throat:      Mouth: Mucous membranes are moist.      Pharynx: Oropharynx is clear. No oropharyngeal exudate or posterior oropharyngeal erythema.   Eyes:      General: No scleral icterus.        Right eye: No discharge.         Left eye: No discharge.      Extraocular Movements: Extraocular movements intact.      Conjunctiva/sclera: Conjunctivae normal.      Pupils: Pupils are equal, round, and reactive to light.   Neck:      Thyroid: No thyromegaly.      Vascular: No carotid bruit or JVD.      Trachea: No tracheal deviation.   Cardiovascular:      Rate " and Rhythm: Normal rate and regular rhythm.      Pulses: Normal pulses.      Heart sounds: Normal heart sounds. No murmur heard.  Pulmonary:      Effort: Pulmonary effort is normal. No respiratory distress.      Breath sounds: Normal breath sounds. No stridor. No wheezing, rhonchi or rales.   Abdominal:      General: Abdomen is flat. Bowel sounds are normal. There is no distension.      Palpations: Abdomen is soft. There is no mass.      Tenderness: There is no abdominal tenderness. There is no guarding or rebound.   Musculoskeletal:         General: No swelling, tenderness or deformity. Normal range of motion.      Cervical back: Normal range of motion and neck supple. No rigidity.      Right lower leg: No edema.      Left lower leg: No edema.   Lymphadenopathy:      Cervical: No cervical adenopathy.   Skin:     General: Skin is warm and dry.      Capillary Refill: Capillary refill takes less than 2 seconds.      Coloration: Skin is not jaundiced.      Findings: No bruising, erythema or rash.   Neurological:      General: No focal deficit present.      Mental Status: She is alert and oriented to person, place, and time.      Cranial Nerves: No cranial nerve deficit.      Sensory: No sensory deficit.      Motor: No abnormal muscle tone.      Coordination: Coordination normal.      Gait: Gait normal.      Deep Tendon Reflexes: Reflexes are normal and symmetric. Reflexes normal.   Psychiatric:         Mood and Affect: Mood normal.         Behavior: Behavior normal.         Thought Content: Thought content normal.         Judgment: Judgment normal.       Administrative Statements   I have spent a total time of 20 minutes in caring for this patient on the day of the visit/encounter including Diagnostic results, Prognosis, Risks and benefits of tx options, Instructions for management, Patient and family education, Importance of tx compliance, Risk factor reductions, and Impressions continue with.

## 2025-05-05 NOTE — PATIENT INSTRUCTIONS
Medicare Preventive Visit Patient Instructions  Thank you for completing your Welcome to Medicare Visit or Medicare Annual Wellness Visit today. Your next wellness visit will be due in one year (5/6/2026).  The screening/preventive services that you may require over the next 5-10 years are detailed below. Some tests may not apply to you based off risk factors and/or age. Screening tests ordered at today's visit but not completed yet may show as past due. Also, please note that scanned in results may not display below.  Preventive Screenings:  Service Recommendations Previous Testing/Comments   Colorectal Cancer Screening  * Colonoscopy    * Fecal Occult Blood Test (FOBT)/Fecal Immunochemical Test (FIT)  * Fecal DNA/Cologuard Test  * Flexible Sigmoidoscopy Age: 45-75 years old   Colonoscopy: every 10 years (may be performed more frequently if at higher risk)  OR  FOBT/FIT: every 1 year  OR  Cologuard: every 3 years  OR  Sigmoidoscopy: every 5 years  Screening may be recommended earlier than age 45 if at higher risk for colorectal cancer. Also, an individualized decision between you and your healthcare provider will decide whether screening between the ages of 76-85 would be appropriate. Colonoscopy: Not on file  FOBT/FIT: Not on file  Cologuard: Not on file  Sigmoidoscopy: Not on file          Breast Cancer Screening Age: 40+ years old  Frequency: every 1-2 years  Not required if history of left and right mastectomy Mammogram: 04/17/2019        Cervical Cancer Screening Between the ages of 21-29, pap smear recommended once every 3 years.   Between the ages of 30-65, can perform pap smear with HPV co-testing every 5 years.   Recommendations may differ for women with a history of total hysterectomy, cervical cancer, or abnormal pap smears in past. Pap Smear: Not on file    Screening Not Indicated   Hepatitis C Screening Once for adults born between 1945 and 1965  More frequently in patients at high risk for Hepatitis C  Hep C Antibody: Not on file        Diabetes Screening 1-2 times per year if you're at risk for diabetes or have pre-diabetes Fasting glucose: 105 mg/dL (2/21/2024)  A1C: 6.0 (9/11/2024)  Screening Not Indicated  History Diabetes   Cholesterol Screening Once every 5 years if you don't have a lipid disorder. May order more often based on risk factors. Lipid panel: 09/17/2024    Screening Not Indicated  History Lipid Disorder     Other Preventive Screenings Covered by Medicare:  Abdominal Aortic Aneurysm (AAA) Screening: covered once if your at risk. You're considered to be at risk if you have a family history of AAA.  Lung Cancer Screening: covers low dose CT scan once per year if you meet all of the following conditions: (1) Age 55-77; (2) No signs or symptoms of lung cancer; (3) Current smoker or have quit smoking within the last 15 years; (4) You have a tobacco smoking history of at least 20 pack years (packs per day multiplied by number of years you smoked); (5) You get a written order from a healthcare provider.  Glaucoma Screening: covered annually if you're considered high risk: (1) You have diabetes OR (2) Family history of glaucoma OR (3)  aged 50 and older OR (4)  American aged 65 and older  Osteoporosis Screening: covered every 2 years if you meet one of the following conditions: (1) You're estrogen deficient and at risk for osteoporosis based off medical history and other findings; (2) Have a vertebral abnormality; (3) On glucocorticoid therapy for more than 3 months; (4) Have primary hyperparathyroidism; (5) On osteoporosis medications and need to assess response to drug therapy.   Last bone density test (DXA Scan): 02/20/2017.  HIV Screening: covered annually if you're between the age of 15-65. Also covered annually if you are younger than 15 and older than 65 with risk factors for HIV infection. For pregnant patients, it is covered up to 3 times per  pregnancy.    Immunizations:  Immunization Recommendations   Influenza Vaccine Annual influenza vaccination during flu season is recommended for all persons aged >= 6 months who do not have contraindications   Pneumococcal Vaccine   * Pneumococcal conjugate vaccine = PCV13 (Prevnar 13), PCV15 (Vaxneuvance), PCV20 (Prevnar 20)  * Pneumococcal polysaccharide vaccine = PPSV23 (Pneumovax) Adults 19-65 yo with certain risk factors or if 65+ yo  If never received any pneumonia vaccine: recommend Prevnar 20 (PCV20)  Give PCV20 if previously received 1 dose of PCV13 or PPSV23   Hepatitis B Vaccine 3 dose series if at intermediate or high risk (ex: diabetes, end stage renal disease, liver disease)   Respiratory syncytial virus (RSV) Vaccine - COVERED BY MEDICARE PART D  * RSVPreF3 (Arexvy) CDC recommends that adults 60 years of age and older may receive a single dose of RSV vaccine using shared clinical decision-making (SCDM)   Tetanus (Td) Vaccine - COST NOT COVERED BY MEDICARE PART B Following completion of primary series, a booster dose should be given every 10 years to maintain immunity against tetanus. Td may also be given as tetanus wound prophylaxis.   Tdap Vaccine - COST NOT COVERED BY MEDICARE PART B Recommended at least once for all adults. For pregnant patients, recommended with each pregnancy.   Shingles Vaccine (Shingrix) - COST NOT COVERED BY MEDICARE PART B  2 shot series recommended in those 19 years and older who have or will have weakened immune systems or those 50 years and older     Health Maintenance Due:      Topic Date Due   • DXA SCAN  02/20/2020     Immunizations Due:      Topic Date Due   • COVID-19 Vaccine (3 - 2024-25 season) 09/01/2024     Advance Directives   What are advance directives?  Advance directives are legal documents that state your wishes and plans for medical care. These plans are made ahead of time in case you lose your ability to make decisions for yourself. Advance directives can  apply to any medical decision, such as the treatments you want, and if you want to donate organs.   What are the types of advance directives?  There are many types of advance directives, and each state has rules about how to use them. You may choose a combination of any of the following:  Living will:  This is a written record of the treatment you want. You can also choose which treatments you do not want, which to limit, and which to stop at a certain time. This includes surgery, medicine, IV fluid, and tube feedings.   Durable power of  for healthcare (DPAHC):  This is a written record that states who you want to make healthcare choices for you when you are unable to make them for yourself. This person, called a proxy, is usually a family member or a friend. You may choose more than 1 proxy.  Do not resuscitate (DNR) order:  A DNR order is used in case your heart stops beating or you stop breathing. It is a request not to have certain forms of treatment, such as CPR. A DNR order may be included in other types of advance directives.  Medical directive:  This covers the care that you want if you are in a coma, near death, or unable to make decisions for yourself. You can list the treatments you want for each condition. Treatment may include pain medicine, surgery, blood transfusions, dialysis, IV or tube feedings, and a ventilator (breathing machine).  Values history:  This document has questions about your views, beliefs, and how you feel and think about life. This information can help others choose the care that you would choose.  Why are advance directives important?  An advance directive helps you control your care. Although spoken wishes may be used, it is better to have your wishes written down. Spoken wishes can be misunderstood, or not followed. Treatments may be given even if you do not want them. An advance directive may make it easier for your family to make difficult choices about your care.    Weight Management   Why it is important to manage your weight:  Being overweight increases your risk of health conditions such as heart disease, high blood pressure, type 2 diabetes, and certain types of cancer. It can also increase your risk for osteoarthritis, sleep apnea, and other respiratory problems. Aim for a slow, steady weight loss. Even a small amount of weight loss can lower your risk of health problems.  How to lose weight safely:  A safe and healthy way to lose weight is to eat fewer calories and get regular exercise. You can lose up about 1 pound a week by decreasing the number of calories you eat by 500 calories each day.   Healthy meal plan for weight management:  A healthy meal plan includes a variety of foods, contains fewer calories, and helps you stay healthy. A healthy meal plan includes the following:  Eat whole-grain foods more often.  A healthy meal plan should contain fiber. Fiber is the part of grains, fruits, and vegetables that is not broken down by your body. Whole-grain foods are healthy and provide extra fiber in your diet. Some examples of whole-grain foods are whole-wheat breads and pastas, oatmeal, brown rice, and bulgur.  Eat a variety of vegetables every day.  Include dark, leafy greens such as spinach, kale, deny greens, and mustard greens. Eat yellow and orange vegetables such as carrots, sweet potatoes, and winter squash.   Eat a variety of fruits every day.  Choose fresh or canned fruit (canned in its own juice or light syrup) instead of juice. Fruit juice has very little or no fiber.  Eat low-fat dairy foods.  Drink fat-free (skim) milk or 1% milk. Eat fat-free yogurt and low-fat cottage cheese. Try low-fat cheeses such as mozzarella and other reduced-fat cheeses.  Choose meat and other protein foods that are low in fat.  Choose beans or other legumes such as split peas or lentils. Choose fish, skinless poultry (chicken or turkey), or lean cuts of red meat (beef or  pork). Before you cook meat or poultry, cut off any visible fat.   Use less fat and oil.  Try baking foods instead of frying them. Add less fat, such as margarine, sour cream, regular salad dressing and mayonnaise to foods. Eat fewer high-fat foods. Some examples of high-fat foods include french fries, doughnuts, ice cream, and cakes.  Eat fewer sweets.  Limit foods and drinks that are high in sugar. This includes candy, cookies, regular soda, and sweetened drinks.  Exercise:  Exercise at least 30 minutes per day on most days of the week. Some examples of exercise include walking, biking, dancing, and swimming. You can also fit in more physical activity by taking the stairs instead of the elevator or parking farther away from stores. Ask your healthcare provider about the best exercise plan for you.   Narcotic (Opioid) Safety    Use narcotics safely:  Take prescribed narcotics exactly as directed  Do not give narcotics to others or take narcotics that belong to someone else  Do not mix narcotics without medicines or alcohol  Do not drive or operate heavy machinery after you take the narcotic  Monitor for side effects and notify your healthcare provider if you experienced side effects such as nausea, sleepiness, itching, or trouble thinking clearly.    Manage constipation:    Constipation is the most common side effect of narcotic medicine. Constipation is when you have hard, dry bowel movements, or you go longer than usual between bowel movements. Tell your healthcare provider about all changes in your bowel movements while you are taking narcotics. He or she may recommend laxative medicine to help you have a bowel movement. He or she may also change the kind of narcotic you are taking, or change when you take it. The following are more ways you can prevent or relieve constipation:    Drink liquids as directed.  You may need to drink extra liquids to help soften and move your bowels. Ask how much liquid to drink  each day and which liquids are best for you.  Eat high-fiber foods.  This may help decrease constipation by adding bulk to your bowel movements. High-fiber foods include fruits, vegetables, whole-grain breads and cereals, and beans. Your healthcare provider or dietitian can help you create a high-fiber meal plan. Your provider may also recommend a fiber supplement if you cannot get enough fiber from food.  Exercise regularly.  Regular physical activity can help stimulate your intestines. Walking is a good exercise to prevent or relieve constipation. Ask which exercises are best for you.  Schedule a time each day to have a bowel movement.  This may help train your body to have regular bowel movements. Bend forward while you are on the toilet to help move the bowel movement out. Sit on the toilet for at least 10 minutes, even if you do not have a bowel movement.    Store narcotics safely:   Store narcotics where others cannot easily get them.  Keep them in a locked cabinet or secure area. Do not  keep them in a purse or other bag you carry with you. A person may be looking for something else and find the narcotics.  Make sure narcotics are stored out of the reach of children.  A child can easily overdose on narcotics. Narcotics may look like candy to a small child.    The best way to dispose of narcotics:      The laws vary by country and area. In the [place], the best way is to return the narcotics through a take-back program. This program is offered by the [place] Drug Enforcement Agency ([place]). The following are options for using the program:  Take the narcotics to a [place] collection site.  The site is often a law enforcement center. Call your local law enforcement center for scheduled take-back days in your area. You will be given information on where to go if the collection site is in a different location.  Take the narcotics to an approved pharmacy or hospital.  A pharmacy or hospital may be set up as a collection  site. You will need to ask if it is a [place] collection site if you were not directed there. A pharmacy or doctor's office may not be able to take back narcotics unless it is a [place] site.  Use a mail-back system.  This means you are given containers to put the narcotics into. You will then mail them in the containers.  Use a take-back drop box.  This is a place to leave the narcotics at any time. People and animals will not be able to get into the box. Your local law enforcement agency can tell you where to find a drop box in your area.    Other ways to manage pain:   Ask your healthcare provider about non-narcotic medicines to control pain.  Nonprescription medicines include NSAIDs (such as ibuprofen) and acetaminophen. Prescription medicines include muscle relaxers, antidepressants, and steroids.  Pain may be managed without any medicines.  Some ways to relieve pain include massage, aromatherapy, or meditation. Physical or occupational therapy may also help.    For more information:   Drug Enforcement Administration  [address] [place] [address]  Phone: 2- 913 - 183-2668  Web Address: https://www.deadiversion.[facility].gov/drug_disposal/    US Food and Drug Administration  [address] [place] [address]  Phone: 2- 485 - 359-6718  Web Address: http://www.fda.gov     © Copyright "[employer] 2018 Information is for End User's use only and may not be sold, redistributed or otherwise used for commercial purposes. All illustrations and images included in CareNotes® are the copyrighted property of A.D.A.M., Inc. or [employer]

## 2025-05-09 ENCOUNTER — TELEPHONE (OUTPATIENT)
Age: 85
End: 2025-05-09

## 2025-05-09 NOTE — TELEPHONE ENCOUNTER
Patient called to see if she has labs to get done for her 9/5/25 appt with Dr Simpson. If she does, please order for [facility] home draw phone [phone] fax  [phone]. Please let the patient know if labs have been ordered or not.

## 2025-05-16 ENCOUNTER — TELEPHONE (OUTPATIENT)
Dept: FAMILY MEDICINE CLINIC | Facility: CLINIC | Age: 85
End: 2025-05-16

## 2025-05-21 DIAGNOSIS — G47.00 INSOMNIA, UNSPECIFIED TYPE: ICD-10-CM

## 2025-05-21 RX ORDER — ZOLPIDEM TARTRATE 6.25 MG/1
6.25 TABLET, FILM COATED, EXTENDED RELEASE ORAL
Qty: 30 TABLET | Refills: 0 | Status: SHIPPED | OUTPATIENT
Start: 2025-05-21

## 2025-05-21 NOTE — TELEPHONE ENCOUNTER
1 [id number] ** 04/18/2025 04/18/2025 traMADol HCL (Tablet) 90.0 30 50 MG 30.0 SALONI BROADT [facility] Medicare 0 / 0 [place]    1 [id number] ** 04/18/2025 04/18/2025 ALPRAZolam (Tablet) 60.0 30 0.25 MG NA SALONI BROADT [facility] Medicare 0 / 3 [place]    1 [id number] ** 04/18/2025 04/18/2025 Zolpidem Tartrate (Tablet, Extended Release) 30.0 30 6.25 MG NA SALONI BROADT [facility] Medicare 0 / 0 [place]

## 2025-05-21 NOTE — TELEPHONE ENCOUNTER
Patient out of medication    Reason for call:   [x] Refill   [] Prior Auth  [] Other:     Office:   [x] PCP/Provider - Orly Simpson   [] Specialty/Provider -     Medication: zolpidem (AMBIEN CR) 6.25 MG CR tablet     Dose/Frequency: Take 1 tablet (6.25 mg total) by mouth daily at bedtime as needed for sleep     Quantity: 30    Pharmacy: [id number]    [facility]   Does the patient have enough for 3 days?   [] Yes   [x] No - Send as HP to POD

## 2025-06-18 DIAGNOSIS — G47.00 INSOMNIA, UNSPECIFIED TYPE: ICD-10-CM

## 2025-06-18 DIAGNOSIS — M54.16 LUMBAR RADICULOPATHY: ICD-10-CM

## 2025-06-18 RX ORDER — ZOLPIDEM TARTRATE 6.25 MG/1
6.25 TABLET, FILM COATED, EXTENDED RELEASE ORAL
Qty: 30 TABLET | Refills: 0 | Status: SHIPPED | OUTPATIENT
Start: 2025-06-18

## 2025-06-18 RX ORDER — TRAMADOL HYDROCHLORIDE 50 MG/1
50 TABLET ORAL EVERY 8 HOURS PRN
Qty: 90 TABLET | Refills: 0 | Status: SHIPPED | OUTPATIENT
Start: 2025-06-18

## 2025-06-18 RX ORDER — METHOCARBAMOL 500 MG/1
500 TABLET, FILM COATED ORAL 4 TIMES DAILY PRN
Qty: 60 TABLET | Refills: 0 | Status: SHIPPED | OUTPATIENT
Start: 2025-06-18

## 2025-06-18 NOTE — TELEPHONE ENCOUNTER
Reason for call:   [x] Refill   [] Prior Auth  [] Other:     Office:   [x] PCP/Provider -   [] Specialty/Provider -       methocarbamol (ROBAXIN) 500 mg tablet  Take 1 tablet (500 mg total) by mouth 4 (four) times a day as needed  Normal, R-0        zolpidem (AMBIEN CR) 6.25 MG CR tablet  Take 1 tablet (6.25 mg total) by mouth daily at bedtime as needed for sleep  Normal, Disp-30 tablet, R-0        traMADol (ULTRAM) 50 mg tablet  Take 1 tablet (50 mg total) by mouth every 8 (eight) hours as needed for moderate pain  Normal, Disp-90 tablet, R-0  Maximum MME/Day: 15 MME/day for this order          [id number] [place] - [address] [phone]      Local Pharmacy   Does the patient have enough for 3 days?   [x] Yes   [] No - Send as HP to POD    Mail Away Pharmacy   Does the patient have enough for 10 days?   [] Yes   [] No - Send as HP to POD

## 2025-06-18 NOTE — TELEPHONE ENCOUNTER
1 [id number] ** 05/21/2025 05/21/2025 Zolpidem Tartrate (Tablet, Extended Release) 30.0 30 6.25 MG NA SALONI BROADT [facility] Medicare 0 / 0 [place]    1 [id number] ** 04/18/2025 04/18/2025 traMADol HCL (Tablet) 90.0 30 50 MG 30.0 [facility]ONI BR[facility]ADT [facility] Medicare 0 / 0 [place]    1 [id number] ** 04/18/2025 04/18/2025 ALPRAZolam (Tablet) 60.0 30 0.25 MG NA SALONI BROADT [facility] Medicare 0 / 3 [place]    1 [id number] ** 04/18/2025 04/18/2025 Zolpidem Tartrate (Tablet, Extended Release) 30.0 30 6.25 MG NA SALONI BROADT [facility] Medicare 0 / 0 [place]    1 [id number] ** 03/17/2025 03/17/2025 traMADol HCL (Tablet) 20.0 5 50 MG 40.0 SARINA MIHIR [facility] Medicare 0 / 0 [place]    1 [id number] ** 03/14/2025 03/13/2025 Zolpidem Tartrate (Tablet, Extended Release) 30.0 30 6.25 MG NA SALONI BROADT [facility] Medicare 0 / 0 [place]    2 [id number] ** 02/13/2025 02/13/2025 traMADol HCL (Tablet) 20.0 5 50 MG 40.0 SARINA MIHIR [facility] Medicare 0 / 0 [place]    2 [id number] ** 02/13/2025 02/13/2025 Zolpidem Tartrate (Tablet, Extended Release) 30.0 30 6.25 MG NA SALONI BROADT [facility] Medicare 0 / 0 [place]    1 [id number] ** 01/14/2025 10/14/2024 Zolpidem Tartrate (Tablet, Extended Release) 30.0 30 6.25 MG NA SALONI BROADT [facility] Medicare 3 / 3 [place]    1 [id number] ** 01/10/2025 01/08/2025 traMADol HCL (Tablet) 28.0 7 50 MG 40.0 SARINA [facility]IR [facility] Medicare 0 / 0 [place]

## 2025-07-29 DIAGNOSIS — G25.81 RESTLESS LEG SYNDROME: ICD-10-CM

## 2025-07-30 RX ORDER — ROPINIROLE 0.5 MG/1
0.5 TABLET, FILM COATED ORAL
Qty: 30 TABLET | Refills: 2 | Status: SHIPPED | OUTPATIENT
Start: 2025-07-30

## 2025-08-15 ENCOUNTER — TELEPHONE (OUTPATIENT)
Dept: LAB | Facility: HOSPITAL | Age: 85
End: 2025-08-15

## 2025-08-15 ENCOUNTER — TELEPHONE (OUTPATIENT)
Age: 85
End: 2025-08-15

## 2025-08-18 DIAGNOSIS — M54.16 LUMBAR RADICULOPATHY: ICD-10-CM

## 2025-08-18 RX ORDER — TRAMADOL HYDROCHLORIDE 50 MG/1
50 TABLET ORAL EVERY 8 HOURS PRN
Qty: 90 TABLET | Refills: 0 | Status: SHIPPED | OUTPATIENT
Start: 2025-08-18